# Patient Record
Sex: FEMALE | Race: WHITE | NOT HISPANIC OR LATINO | Employment: UNEMPLOYED | URBAN - METROPOLITAN AREA
[De-identification: names, ages, dates, MRNs, and addresses within clinical notes are randomized per-mention and may not be internally consistent; named-entity substitution may affect disease eponyms.]

---

## 2017-02-20 RX ORDER — POTASSIUM CHLORIDE 20 MEQ/1
TABLET, EXTENDED RELEASE ORAL
Qty: 90 TABLET | Refills: 0 | Status: SHIPPED | OUTPATIENT
Start: 2017-02-20 | End: 2017-05-16 | Stop reason: SDUPTHER

## 2017-03-07 ENCOUNTER — CLINICAL SUPPORT (OUTPATIENT)
Dept: ELECTROPHYSIOLOGY | Facility: CLINIC | Age: 37
End: 2017-03-07
Payer: COMMERCIAL

## 2017-03-07 DIAGNOSIS — Z95.810 PRESENCE OF AUTOMATIC CARDIOVERTER/DEFIBRILLATOR (AICD): ICD-10-CM

## 2017-03-07 DIAGNOSIS — I45.81 LONG Q-T SYNDROME: ICD-10-CM

## 2017-03-07 DIAGNOSIS — I45.81 PROLONGED QT INTERVAL SYNDROME: ICD-10-CM

## 2017-03-07 DIAGNOSIS — I49.01 VENTRICULAR FIBRILLATION: ICD-10-CM

## 2017-03-07 PROCEDURE — 93283 PRGRMG EVAL IMPLANTABLE DFB: CPT | Mod: S$GLB,,, | Performed by: INTERNAL MEDICINE

## 2017-05-04 ENCOUNTER — TELEPHONE (OUTPATIENT)
Dept: OPHTHALMOLOGY | Facility: CLINIC | Age: 37
End: 2017-05-04

## 2017-05-04 NOTE — TELEPHONE ENCOUNTER
----- Message from Esteban Patel sent at 5/4/2017 10:14 AM CDT -----  Contact: Arianna  Ms. Duval needs an updated prescription sent to Britta in the contact lens dept. Ms. Duval can be reached at [phone]

## 2017-05-04 NOTE — TELEPHONE ENCOUNTER
Dr. Avila ok'd to update contact lens over the phone without a visit if her vision is stable.  I left a message stating that I needed to know the brand of contact lens she is wearing and the power.  I also need to know how she would like to receive the RX; fax, mail or email.  Gave call back number for Arianna to call with information

## 2017-05-09 ENCOUNTER — TELEPHONE (OUTPATIENT)
Dept: OPHTHALMOLOGY | Facility: CLINIC | Age: 37
End: 2017-05-09

## 2017-05-09 NOTE — TELEPHONE ENCOUNTER
Arianna called with her contact lens information and confirmed that her vision has been stable.  Dr. Avila agreed to renew her past contact lens rx.  She is wearing Air Optix [employer].C 8.6 OD -4.25, OS -7.00.  Arianna requested that her RX be mailed and released to [facility]'s contact lens department for ordering and dispensing.  I informed that the contact lens department will call he for payment before order is placed.

## 2017-05-11 DIAGNOSIS — I45.81 LONG Q-T SYNDROME: Primary | ICD-10-CM

## 2017-05-16 RX ORDER — POTASSIUM CHLORIDE 20 MEQ/1
20 TABLET, EXTENDED RELEASE ORAL DAILY
Qty: 90 TABLET | Refills: 3 | Status: SHIPPED | OUTPATIENT
Start: 2017-05-16 | End: 2017-08-29 | Stop reason: SDUPTHER

## 2017-05-16 RX ORDER — METOPROLOL SUCCINATE 25 MG/1
25 TABLET, EXTENDED RELEASE ORAL DAILY
Qty: 90 TABLET | Refills: 3 | Status: SHIPPED | OUTPATIENT
Start: 2017-05-16 | End: 2017-11-08 | Stop reason: SDUPTHER

## 2017-06-13 ENCOUNTER — CLINICAL SUPPORT (OUTPATIENT)
Dept: ELECTROPHYSIOLOGY | Facility: CLINIC | Age: 37
End: 2017-06-13
Payer: COMMERCIAL

## 2017-06-13 DIAGNOSIS — I45.81 LONG QT SYNDROME: ICD-10-CM

## 2017-06-13 DIAGNOSIS — I49.8 AV NODE ARRHYTHMIA: ICD-10-CM

## 2017-06-13 DIAGNOSIS — Z95.810 PRESENCE OF AUTOMATIC CARDIOVERTER/DEFIBRILLATOR (AICD): ICD-10-CM

## 2017-06-13 PROCEDURE — 93296 REM INTERROG EVL PM/IDS: CPT | Mod: S$GLB,,, | Performed by: INTERNAL MEDICINE

## 2017-06-13 PROCEDURE — 93295 DEV INTERROG REMOTE 1/2/MLT: CPT | Mod: S$GLB,,, | Performed by: INTERNAL MEDICINE

## 2017-06-22 ENCOUNTER — TELEPHONE (OUTPATIENT)
Dept: ELECTROPHYSIOLOGY | Facility: CLINIC | Age: 37
End: 2017-06-22

## 2017-06-26 ENCOUNTER — TELEPHONE (OUTPATIENT)
Dept: ELECTROPHYSIOLOGY | Facility: CLINIC | Age: 37
End: 2017-06-26

## 2017-06-27 ENCOUNTER — OFFICE VISIT (OUTPATIENT)
Dept: NEUROLOGY | Facility: CLINIC | Age: 37
End: 2017-06-27
Payer: COMMERCIAL

## 2017-06-27 VITALS
DIASTOLIC BLOOD PRESSURE: 75 MMHG | HEART RATE: 78 BPM | SYSTOLIC BLOOD PRESSURE: 123 MMHG | WEIGHT: 110.31 LBS | HEIGHT: 62 IN | BODY MASS INDEX: 20.3 KG/M2

## 2017-06-27 DIAGNOSIS — G44.86 CERVICOGENIC HEADACHE: Primary | ICD-10-CM

## 2017-06-27 DIAGNOSIS — R51.9 NEW ONSET HEADACHE: ICD-10-CM

## 2017-06-27 DIAGNOSIS — M54.81 BILATERAL OCCIPITAL NEURALGIA: ICD-10-CM

## 2017-06-27 PROCEDURE — 99999 PR PBB SHADOW E&M-EST. PATIENT-LVL III: CPT | Mod: PBBFAC,,, | Performed by: NURSE PRACTITIONER

## 2017-06-27 PROCEDURE — 99204 OFFICE O/P NEW MOD 45 MIN: CPT | Mod: S$GLB,,, | Performed by: NURSE PRACTITIONER

## 2017-06-27 RX ORDER — METOPROLOL SUCCINATE 50 MG/1
TABLET, EXTENDED RELEASE ORAL
COMMUNITY
Start: 2017-05-16 | End: 2017-06-27 | Stop reason: DRUGHIGH

## 2017-06-27 RX ORDER — PREDNISONE 20 MG/1
TABLET ORAL
Qty: 9 TABLET | Refills: 0 | Status: SHIPPED | OUTPATIENT
Start: 2017-06-27 | End: 2017-08-15

## 2017-06-27 NOTE — PROGRESS NOTES
"SUBJECTIVE:  Patient ID: Arianna Duval   MRN: [id number]  Referred By: Dr. Zunilda Beckett    Chief Complaint: Consult    History of Present Illness:   36 y.o. female with history of long QT syndrome, s/p AICD placement, presents alone for evaluation of head/neck pain.     Pain began 10 days to 2 weeks ago after she got her hair blown out and then her neck began to hurt on the left side following by her face hurting, facial pain has since resolved.  Initially the pain felt like a "crick in her neck", and was worsened when she turned her head, however pain no longer worsens when she turns her head. The pain in her neck has since resolved and is now more prevalent in her occipitalis.  She states in general she has never been a headachy type of person.  She feels the pain in her head is pretty consistent throughout the day, however at times the pain does get worse and within the last week the pain has woken her up from her sleep every night.  She rates the pain anywhere from a 1 - 9 out of 10, and states at its worst she feels like her head is going to explode.      Associated Symptoms - occasional nausea when it gets to be a 9/10  Triggers - none   Aggravating Factors - unable to identify   Alleviating Factors - ?  Head Trauma? Infection? Fever? Cancer? Pregnancy? <-- denies   Recent Changes - increased stress    Sleep- is woken up by pain nightly     Family Hx of Migraines, aneurysms, brain tumors   Positives in bold: Hx of Kidney Stones, asthma, GI bleed, osteoporosis, CAD/MI, CVA/TIA, DM      Treatments Tried and Response  Tylenol - takes the edge off   Ibuprofen - helped a little     Current Medications:    metoprolol succinate (TOPROL-XL) 25 MG 24 hr tablet, Take 1 tablet (25 mg total) by mouth once daily., Disp: 90 tablet, Rfl: 3    potassium chloride SA (K-DUR,KLOR-CON) 20 MEQ tablet, Take 1 tablet (20 mEq total) by mouth once daily., Disp: 90 tablet, Rfl: 3    prenatal multivit-Ca-min-Fe-FA Tab, Take " "by mouth once daily., Disp: , Rfl:     predniSONE (DELTASONE) 20 MG tablet, 3 tabs by mouth x 3 days.  Take in the morning with breakfast., Disp: 9 tablet, Rfl: 0    predniSONE (DELTASONE) 20 MG tablet, 3 tabs x 2 days, 2 tabs x 2 days, 1 tab x 2 days.  Take in morning with meal., Disp: 12 tablet, Rfl: 0    Review of Systems   Review of Systems   Constitutional: Positive for fatigue. Negative for activity change, chills and fever.   HENT: Negative for congestion, ear pain, facial swelling, hearing loss, tinnitus and voice change.    Eyes: Negative for photophobia, pain and visual disturbance.   Respiratory: Negative for chest tightness and shortness of breath.    Cardiovascular: Negative for chest pain and palpitations.   Gastrointestinal: Positive for nausea. Negative for vomiting.   Genitourinary: Negative for difficulty urinating and dysuria.   Musculoskeletal: Positive for neck pain and neck stiffness. Negative for back pain, gait problem and myalgias.   Skin: Negative for pallor.   Allergic/Immunologic: Negative for immunocompromised state.   Neurological: Positive for headaches. Negative for syncope, weakness, light-headedness and numbness.   Psychiatric/Behavioral: Positive for sleep disturbance (is woken up due to pain nightly ). Negative for behavioral problems, confusion and decreased concentration. The patient is not nervous/anxious.      OBJECTIVE:  Vitals:  /75   Pulse 78   Ht 5' 2" (1.575 m)   Wt 50 kg (110 lb 4.8 oz)   BMI 20.17 kg/m²     Physical Exam   Constitutional:   She appears well-developed and well-nourished. She is well groomed.  NAD  HENT:    Head: Normocephalic and atraumatic, oral and nasal mucosa intact.  Frontalis was NTTP, temporalis was NTTP   Eyes: Conjunctivae and EOM are normal. Pupils are equal, round, and reactive to light   Neck: Neck supple. .Occiput and trapezius NTTP  Musculoskeletal: Normal range of motion. No joint stiffness. No vertebral point " tenderness.  Skin: Skin is warm and dry.  Psychiatric: Normal mood and affect.     Neuro exam:    Mental status:  The patient is awake, alert, and oriented to person, place and time.  Language is intact.   Remote and recent memory are intact  No findings to suggest executive dysfuntion    Patient has adequate insight    Mood is stable    Cranial Nerves:  Fundoscopic examination does not reveal any occult papilledema.    Pupils are equal and reactive to light.    Extraocular movements are intact and without nystagmus.    Visual fields are full to confrontation testing.   Facial movement is symmetric.  Facial sensation is intact.    Hearing is normal.   Uvula in midline. Tongue in midline without fasiculation  FROM of neck in all (6) directions, shoulder shrug symmetrical.    Coordination:     Finger to nose - normal and symmetric bilaterally   Heel to shin test - normal and symmetric bilaterally     Motor:  Normal muscle bulk and symmetry. No fasciculations were noted.   RUE:appropriate against gravity and medium force as tested 5/5  LUE: appropriate against gravity and medium force as tested 5/5  RLE:appropriate against gravity and medium force as tested 5/5              LLE: appropriate against gravity and medium force as tested 5/5    Reflexes:  Right Brachioradialis 2+  Left Brachioradialis 2+  Right Biceps 2+  Left Biceps 2+  Right Patellar2+  Left Patellar 2+                          Lester was negative     Sensory:  RUE  intact light touch and temperature  LUE intact light touch and temperature    RLE intact light touch and temperature  LLE intact light touch and temperature    Gait:   Romberg - negative   Normal gait  Tandem, Heel, and Toe Walk - normal     ASSESSMENT:  1. Cervicogenic headache    2. Bilateral occipital neuralgia    3. New onset headache      PLAN:  - Ct Scan of C-Spine ordered  - GONB offered - will try steroid taper and if no relief then will try nerve block   - Prednisone taper to break  up headache cycle - 60 mg x 3 days   - Patient to return to clinic in two days if no benefit seen from steroid taper for nerve block     Orders Placed This Encounter    CT Cervical Spine Without Contrast    predniSONE (DELTASONE) 20 MG tablet    predniSONE (DELTASONE) 20 MG tablet     I have discussed realistic goals of care with patient at length as well as medication options, and need for lifestyle adjustment. I have explained that treatment will take time. We have agreed that the goal will be to reduce frequency/intensity/quantity of HA, not to be completely HA free. I have explained my non narcotic policy regarding headache treatment.    Patient to track frequency of headaches.  Patient agreeable to work on lifestyle adjustments.    I spent 50 minutes face-to-face with the patient with >50% of the time spent with counseling and education regarding:  - results of data, diagnosis, and recommendations stated above  - risks, benefits, and side effects to prednisone   - importance of diet and exercise     All questions and concerns addressed.  Patient verbalizes understanding and is agreeable to the above stated plan.    CC: MD Augusta Corcoran, FNP-C  [facility]  [phone]    Dr. Tee was available during today's encounter.

## 2017-06-27 NOTE — LETTER
June 30, 2017      Zunilda Beckett MD  [address]  [place] [address]           [facility]  [address]  [place] [address]  Phone: [phone]  Fax: [phone]          Patient: Arianna Duval   MR Number: [id number]   YOB: 1980   Date of Visit: 6/27/2017       Dear Dr. Zunilda Beckett:    Thank you for referring Arianna Duval to me for evaluation. Attached you will find relevant portions of my assessment and plan of care.    If you have questions, please do not hesitate to call me. I look forward to following Arianna Duval along with you.    Sincerely,    Augusta Ruth, [facility]    Enclosure  CC:  No Recipients    If you would like to receive this communication electronically, please contact [email or web address] or [phone] to request more information on [employer] Link access.    For providers and/or their staff who would like to refer a patient to [facility], please contact us through our one-stop-shop provider referral line, [facility], at [phone].    If you feel you have received this communication in error or would no longer like to receive these types of communications, please e-mail [email or web address]

## 2017-06-28 ENCOUNTER — OFFICE VISIT (OUTPATIENT)
Dept: ELECTROPHYSIOLOGY | Facility: CLINIC | Age: 37
End: 2017-06-28
Payer: COMMERCIAL

## 2017-06-28 ENCOUNTER — CLINICAL SUPPORT (OUTPATIENT)
Dept: ELECTROPHYSIOLOGY | Facility: CLINIC | Age: 37
End: 2017-06-28
Payer: COMMERCIAL

## 2017-06-28 VITALS
WEIGHT: 106.25 LBS | SYSTOLIC BLOOD PRESSURE: 106 MMHG | HEIGHT: 62 IN | HEART RATE: 65 BPM | DIASTOLIC BLOOD PRESSURE: 62 MMHG | BODY MASS INDEX: 19.55 KG/M2

## 2017-06-28 DIAGNOSIS — I45.81 LONG Q-T SYNDROME: ICD-10-CM

## 2017-06-28 DIAGNOSIS — I49.01 VENTRICULAR FIBRILLATION: Primary | ICD-10-CM

## 2017-06-28 DIAGNOSIS — I49.01 VENTRICULAR FIBRILLATION: ICD-10-CM

## 2017-06-28 DIAGNOSIS — I45.81 PROLONGED QT INTERVAL SYNDROME: ICD-10-CM

## 2017-06-28 DIAGNOSIS — Z95.810 PRESENCE OF AUTOMATIC CARDIOVERTER/DEFIBRILLATOR (AICD): ICD-10-CM

## 2017-06-28 DIAGNOSIS — Z95.810 AUTOMATIC IMPLANTABLE CARDIOVERTER-DEFIBRILLATOR IN SITU: ICD-10-CM

## 2017-06-28 PROCEDURE — 99999 PR PBB SHADOW E&M-EST. PATIENT-LVL III: CPT | Mod: PBBFAC,,, | Performed by: INTERNAL MEDICINE

## 2017-06-28 PROCEDURE — 99214 OFFICE O/P EST MOD 30 MIN: CPT | Mod: S$GLB,,, | Performed by: INTERNAL MEDICINE

## 2017-06-28 PROCEDURE — 93000 ELECTROCARDIOGRAM COMPLETE: CPT | Mod: 59,S$GLB,, | Performed by: INTERNAL MEDICINE

## 2017-06-28 PROCEDURE — 93283 PRGRMG EVAL IMPLANTABLE DFB: CPT | Mod: S$GLB,,, | Performed by: INTERNAL MEDICINE

## 2017-06-28 NOTE — PROGRESS NOTES
Subjective:    Patient ID:  Arianna Duval is a 36 y.o. 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   Episode occurred 6 wks post-partum, triggered with startle + stress. Dual chamber ICD implanted 6/10/11 (Dr. Hyman), placed on Toprol. Has not had events to date.   Device interrogation reveals stable function of leads, RA pacing 47%, no RV pacing.  Feeling great.  Continues to run, but not doing half marathons at this time.  Is considering breast lift, has questions regarding timing of this compared to generator change.  Also notes device is dropping a bit.    Review of Systems   Constitution: Negative. Negative for weakness and malaise/fatigue.   Cardiovascular: Negative for chest pain, dyspnea on exertion, irregular heartbeat, leg swelling, near-syncope, orthopnea, palpitations, paroxysmal nocturnal dyspnea and syncope.   Respiratory: Negative for cough and shortness of breath.    Neurological: Negative for dizziness and light-headedness.   All other systems reviewed and are negative.       Objective:    Physical Exam   Constitutional: She is oriented to person, place, and time. She appears well-developed and well-nourished.   Eyes: Conjunctivae are normal. No scleral icterus.   Neck: No JVD present. No tracheal deviation present.   Cardiovascular: Normal rate and regular rhythm.  PMI is not displaced.    Pulmonary/Chest: Effort normal and breath sounds normal. No respiratory distress.   Abdominal: Soft. There is no hepatosplenomegaly. There is no tenderness.   Musculoskeletal: She exhibits no edema or tenderness.   Neurological: She is alert and oriented to person, place, and time.   Skin: Skin is warm and dry. No rash noted.   Psychiatric: She has a normal mood and affect. Her behavior is normal.         Assessment:       1. Ventricular  fibrillation    2. Long Q-T syndrome    3. Automatic implantable cardioverter-defibrillator in situ         Plan:       Continues to do well.  Decrease lower rate to 50 bpm, to improve battery longevity.  Will decrease to 55 bpm today, and then in 3 months to 50 bpm.  We discussed timing of breast lift vs generator change.  I endorsed proceeding with breast lift first, and we can move generator superiorly at time of generator change.  F/u with device monitoring as scheduled, with me in one year.

## 2017-06-30 RX ORDER — PREDNISONE 20 MG/1
TABLET ORAL
Qty: 12 TABLET | Refills: 0 | Status: SHIPPED | OUTPATIENT
Start: 2017-06-30 | End: 2017-08-15

## 2017-07-11 ENCOUNTER — NUTRITION (OUTPATIENT)
Dept: NUTRITION | Facility: CLINIC | Age: 37
End: 2017-07-11

## 2017-07-11 DIAGNOSIS — Z71.3 NUTRITIONAL COUNSELING: Primary | ICD-10-CM

## 2017-07-11 PROCEDURE — 97802 MEDICAL NUTRITION INDIV IN: CPT | Mod: S$GLB,,, | Performed by: NUTRITIONIST

## 2017-07-11 NOTE — PROGRESS NOTES
Nutrition Assessment for Medical Nutrition Therapy    Referring professional: self  Reason for MNT visit: Pt in for education and nutrition counseling regarding proper fueling of her body for optimal nutrition. Weight maintenance  Clients mom and godfather wanted her to see an RD since she lost 8-10 pounds over the last several months when she transitioned to Paleo. Anjana is not trying to lose weight. She wants to make sure she is eating enough food to provide her with the right amount of nutrients. She couples her meals well, but recommended adding a mini meal between lunch and dinner, and always having a carb source with all meals and snacks.   Medical History: Has a pacemaker     Medications:   Current Outpatient Prescriptions:     metoprolol succinate (TOPROL-XL) 25 MG 24 hr tablet, Take 1 tablet (25 mg total) by mouth once daily., Disp: 90 tablet, Rfl: 3    potassium chloride SA (K-DUR,KLOR-CON) 20 MEQ tablet, Take 1 tablet (20 mEq total) by mouth once daily., Disp: 90 tablet, Rfl: 3    predniSONE (DELTASONE) 20 MG tablet, 3 tabs by mouth x 3 days.  Take in the morning with breakfast., Disp: 9 tablet, Rfl: 0    predniSONE (DELTASONE) 20 MG tablet, 3 tabs x 2 days, 2 tabs x 2 days, 1 tab x 2 days.  Take in morning with meal., Disp: 12 tablet, Rfl: 0    prenatal multivit-Ca-min-Fe-FA Tab, Take by mouth once daily., Disp: , Rfl:     Labs: n/a    Wt: 100-105#  BMI: 19.2    Recommended meal plan provided to client:  Breakfast: 1-2 servings of carbs = 15-40 grams + at least 10 grams protein/heart healthy fat   1 cup Frosted Flakes + Fairlife Milk + 1-2 pieces of de jesus    Snack: 1 serving of carb = 15-20 grams + at least 10 grams protein/heart healthy fat   If going >4 hours between breakfast and lunch, a snack is needed. See below for options    Lunch: 4 oz lean protein + unlimited non-starchy veggies + 1 serving of carb = 15-20 grams   Your rotisserie chicken + scoop of almonds + cheese is perfect. Add 1  piece/1 cup of fruit with lunch for Energy source    Snack: 1 serving of carb = 15-20 grams + at least 10 grams protein/heart healthy fat   ½ cup guacamole + 1 serving of Beanitos   1 cup high protein spinach and artichoke dip + 1 serving of whole grain chips of choice   Small can of tuna/chicken + 2 Tbsp alfonso + 1 serving of whole grain crackers of your choice or a piece of fruit   Piece of fruit + 2 Tbsp PB   ½ avocado + piece of fruit   2 eggs + ¼ cup cheese + piece of fruit   1 cup avocado/alva/cilantro dip + 1 serving of whole grain chips/crackers    1/3 cup nuts + piece of fruit    Dinner: 4 oz lean protein + unlimited non-starchy veggies + 1 serving of carb = 15-20 grams   4 oz lean protein (filet, fish, chicken, pork loin, etc) + at least 1 cup non-starchy veggie + carb source (1 piece/1 cup of fruit / 1 serving of Beanitos or whole grain chips)    Snack: Anything UP to 200 calories (treat of your choice)   1-2 glasses of wine   Go Raw chocolate squares (4-5 pieces)        Additional Notes:  Incorporate a source of lean protein, fiber, and heart healthy fat with each meal.   - These three nutrients take the longest to digest, so we feel full longer  -Your meals looked great! I would add a mini meal between lunch and dinner that consists of at least 10 grams lean protein/heart healthy fats + 1 carb source. Also, add 1 piece of fruit with dinner.   Restaurant TipsPick 2 out of the 5:  1. Bread and butter/chips and salsa  2. Appetizer  3. Entrée  4. Dessert  5. Alcohol  Eating out in [place]:  1. www.eatfitnola.com  2. Fit LAURO burton (Free burton for smartphones)  a. List of all Eat Fit restaurants  b. See what dishes are Eat Fit at each restaurant  c. See the nutrition facts for each Eat Fit dish  d. Provides >200 Eat Fit recipes  Salad Fats: Pick 2-3, each being 2 Tbsp:  1. Dressing  2. Cheese  3. Nuts  4. Sanders  5. Avocado (1/4 okay)  6. Guacamole  7. Sour cream  8. Egg (1 okay)  Good for  your kids:  - Fruitables (good for the kids)  - ProYO (protein push-up pops)- also good for the kids. I havent checked Langensteins, but these are found at [employer]  - GreenAde by Daily Greens ( low sugar cold pressed vegetable and fruit juice blends)      Nutrition History       Food allergies  intolerances: none    Dining out: Infrequent, 1-2 times per week    Smoking/alcohol: nonsmoker ; drinks 1-3 glasses of white wine per night...depending on the night/   Beverages:   Drinks plenty of water (about 1 gallon a day)     Meal preparation/shopping: self        Recommendations/Interventions:   Aim for 7-9 hours sleep   Exercise 60 minutes most days  Eat breakfast within 1-2 hours of waking up  Try not to skip any meals or snacks, not going more than 3-4 hours without eating.   At each meal and snack, try to include a source of fiber + lean protein + healthy fat.     Written Materials Provided  These resources are intended to assist the patient in making it easier to choose recommended options when eating out to identify better-for-you brands at the grocery store:     Meal Planning Guide with recommendations discussed along with portion sizes and a customized meal plan.    Eat Fit burton card as a reminder to download the burton to ones smart phone which provides: current Eat Fit partners, approved menu options, food labels for carb counting, & recipes.    [employer] Grocery Product list    RD contact information      Comprehension: good     Motivation to change: high      Follow-up: prn    Counseling time: 1 Hour 30 Minutes

## 2017-08-15 ENCOUNTER — OFFICE VISIT (OUTPATIENT)
Dept: DERMATOLOGY | Facility: CLINIC | Age: 37
End: 2017-08-15
Payer: COMMERCIAL

## 2017-08-15 DIAGNOSIS — D22.9 MULTIPLE BENIGN NEVI: Primary | ICD-10-CM

## 2017-08-15 DIAGNOSIS — Z87.898 HISTORY OF ATYPICAL NEVUS: ICD-10-CM

## 2017-08-15 DIAGNOSIS — Z12.83 SCREENING EXAM FOR SKIN CANCER: ICD-10-CM

## 2017-08-15 PROCEDURE — 99214 OFFICE O/P EST MOD 30 MIN: CPT | Mod: S$GLB,,, | Performed by: DERMATOLOGY

## 2017-08-15 PROCEDURE — 3008F BODY MASS INDEX DOCD: CPT | Mod: S$GLB,,, | Performed by: DERMATOLOGY

## 2017-08-15 PROCEDURE — 99999 PR PBB SHADOW E&M-EST. PATIENT-LVL II: CPT | Mod: PBBFAC,,, | Performed by: DERMATOLOGY

## 2017-08-15 NOTE — PROGRESS NOTES
"  Subjective:       Patient ID:  Arianna Duval is a 36 y.o. female who presents for   Chief Complaint   Patient presents with    Skin Check     TBSE     History of Present Illness: The patient presents for follow up of skin check.    The patient was last seen on: 4/15/16. H/o moderately atypical nevus- right 4th toe excised by EF    Patient is here today for a "mole" check.   Pt has a history of  moderate sun exposure in the past.   Pt recalls several blistering sunburns in the past- 1  Pt has history of tanning bed use- yes  Pt has  had moles removed in the past- yes H/o moderately atypical nevus- right 4th toe excised by EF 6/2015  Pt has history of melanoma in first degree relatives-  none          Review of Systems   Skin: Positive for daily sunscreen use and activity-related sunscreen use. Negative for recent sunburn.   Hematologic/Lymphatic: Does not bruise/bleed easily (has defibrillator).        Objective:    Physical Exam   Constitutional: She appears well-developed and well-nourished. No distress.   Neurological: She is alert and oriented to person, place, and time. She is not disoriented.   Psychiatric: She has a normal mood and affect.   Skin:   Areas Examined (abnormalities noted in diagram):   Scalp / Hair Palpated and Inspected  Head / Face Inspection Performed  Neck Inspection Performed  Chest / Axilla Inspection Performed  Abdomen Inspection Performed  Genitals / Buttocks / Groin Inspection Performed  Back Inspection Performed  RUE Inspected  LUE Inspection Performed  RLE Inspected  LLE Inspection Performed  Nails and Digits Inspection Performed                       Diagram Legend     Erythematous scaling macule/papule c/w actinic keratosis       Vascular papule c/w angioma      Pigmented verrucoid papule/plaque c/w seborrheic keratosis      Yellow umbilicated papule c/w sebaceous hyperplasia      Irregularly shaped tan macule c/w lentigo     1-2 mm smooth white papules consistent with Milia "      Movable subcutaneous cyst with punctum c/w epidermal inclusion cyst      Subcutaneous movable cyst c/w pilar cyst      Firm pink to brown papule c/w dermatofibroma      Pedunculated fleshy papule(s) c/w skin tag(s)      Evenly pigmented macule c/w junctional nevus     Mildly variegated pigmented, slightly irregular-bordered macule c/w mildly atypical nevus      Flesh colored to evenly pigmented papule c/w intradermal nevus       Pink pearly papule/plaque c/w basal cell carcinoma      Erythematous hyperkeratotic cursted plaque c/w SCC      Surgical scar with no sign of skin cancer recurrence      Open and closed comedones      Inflammatory papules and pustules      Verrucoid papule consistent consistent with wart     Erythematous eczematous patches and plaques     Dystrophic onycholytic nail with subungual debris c/w onychomycosis     Umbilicated papule    Erythematous-base heme-crusted tan verrucoid plaque consistent with inflamed seborrheic keratosis     Erythematous Silvery Scaling Plaque c/w Psoriasis     See annotation      Assessment / Plan:        Multiple benign nevi  Total body skin examination performed today including at least 12 points as noted in physical examination. No lesions suspicious for malignancy noted.  Reassurance provided.  Instructed patient to observe lesion(s) for changes and follow up in clinic if changes are noted. Discussed ABCDE's of moles and brochure provided.      History of atypical nevus  Area(s) of previous atypical nevus evaluated with no signs of recurrence.    Screening exam for skin cancer  Total body skin examination performed today including at least 12 points as noted in physical examination. No lesions suspicious for malignancy noted.             Return in about 1 year (around 8/15/2018).

## 2017-08-29 RX ORDER — POTASSIUM CHLORIDE 20 MEQ/1
20 TABLET, EXTENDED RELEASE ORAL DAILY
Qty: 90 TABLET | Refills: 3 | Status: SHIPPED | OUTPATIENT
Start: 2017-08-29 | End: 2018-12-13 | Stop reason: SDUPTHER

## 2017-09-19 ENCOUNTER — OFFICE VISIT (OUTPATIENT)
Dept: OBSTETRICS AND GYNECOLOGY | Facility: CLINIC | Age: 37
End: 2017-09-19
Payer: COMMERCIAL

## 2017-09-19 VITALS
SYSTOLIC BLOOD PRESSURE: 94 MMHG | HEIGHT: 62 IN | DIASTOLIC BLOOD PRESSURE: 68 MMHG | WEIGHT: 109.56 LBS | BODY MASS INDEX: 20.16 KG/M2

## 2017-09-19 DIAGNOSIS — N92.1 IRREGULAR INTERMENSTRUAL BLEEDING: Primary | ICD-10-CM

## 2017-09-19 LAB
B-HCG UR QL: NEGATIVE
CTP QC/QA: YES

## 2017-09-19 PROCEDURE — 99214 OFFICE O/P EST MOD 30 MIN: CPT | Mod: S$GLB,,, | Performed by: OBSTETRICS & GYNECOLOGY

## 2017-09-19 PROCEDURE — 99999 PR PBB SHADOW E&M-EST. PATIENT-LVL II: CPT | Mod: PBBFAC,,, | Performed by: OBSTETRICS & GYNECOLOGY

## 2017-09-19 PROCEDURE — 81025 URINE PREGNANCY TEST: CPT | Mod: QW,S$GLB,, | Performed by: OBSTETRICS & GYNECOLOGY

## 2017-09-19 PROCEDURE — 3008F BODY MASS INDEX DOCD: CPT | Mod: S$GLB,,, | Performed by: OBSTETRICS & GYNECOLOGY

## 2017-09-19 NOTE — PROGRESS NOTES
Subjective:       Patient ID: Arianna Duval is a 36 y.o. female.    Chief Complaint:  Metrorrhagia (spotting been going on for 5 months )      History of Present Illness:  HPI  35 y/o P2 presents for evaluation of irregular menses. Patient not using contraception (partner vasectomy) Reports cycles were previously regular, occurring monthly, lasting 4-5 days with normal flow. About 5 months ago cycles became extremely light with spotting for 1-2 days and occurring every 2-3 weeks. She has not changed her diet significantly but has increased her exercise level over the past year. Not extreme exercise, just 30-60 min per day 7 days a week. Denies abnormal vaginal discharge. Reports decreased libido recently as well. No reported marital issues.    Pap was normal 2016.     GYN & OB History  Patient's last menstrual period was 2017.   Date of Last Pap: 2016    OB History    Para Term  AB Living   2 2 2     2   SAB TAB Ectopic Multiple Live Births           2      # Outcome Date GA Lbr Fransico/2nd Weight Sex Delivery Anes PTL Lv   2 Term 13 38w5d 08:46 / 00:13 3.375 kg (7 lb 7.1 oz) M Vag-Spont EPI N ENA   1 Term 11 40w0d  3.515 kg (7 lb 12 oz) M Vag-Spont EPI  ENA          Review of Systems  Review of Systems   Constitutional: Negative for chills, diaphoresis, fatigue and fever.   Respiratory: Negative for cough and shortness of breath.    Cardiovascular: Negative for chest pain and palpitations.   Gastrointestinal: Negative for abdominal pain, constipation, diarrhea, nausea and vomiting.   Genitourinary: Positive for menstrual problem. Negative for dyspareunia, menorrhagia, pelvic pain, vaginal bleeding, vaginal discharge, vaginal pain and postcoital bleeding.   Neurological: Negative for headaches.   Psychiatric/Behavioral: Negative for depression.           Objective:    Physical Exam:   Constitutional: She is oriented to person, place, and time. She appears well-developed  and well-nourished. No distress.    HENT:   Head: Normocephalic and atraumatic.     Neck: Normal range of motion.     Pulmonary/Chest: Effort normal.        Abdominal: Soft. She exhibits no distension and no mass. There is no tenderness. There is no rebound and no guarding. No hernia.     Genitourinary:   Genitourinary Comments: Normal external female genitalia; vagina rugated, normal; cervix normal, no masses; uterus ~6 weeks size, mobile, nontender; no adnexal masses palpated.           Musculoskeletal: Normal range of motion.       Neurological: She is alert and oriented to person, place, and time.     Psychiatric: She has a normal mood and affect. Her behavior is normal. Judgment and thought content normal.          Assessment:        1. Irregular intermenstrual bleeding               Plan:      Arianna was seen today for metrorrhagia.    Diagnoses and all orders for this visit:    Irregular intermenstrual bleeding  - UPT neg. Patient's  had vasectomy.   - Patient's cycles lighter and more frequent over the past 5 months. She declines contraception at this time (concerned about systemic hormones due to long QT syndrome).   - Patient declines STD screening.   - Cervical ectropion seen on exam. May be cause of light bleeding. Patient reassured.   - Cycles likely lighter due to increased exercise over past year.   - Briefly discussed IUD, may cause amenorrhea and prevent unpredictable bleeding. Patient declines at this time.  - Brief counseling and reassurance given about decreased libido.   - If symptoms worsen, contact clinic or RTC.  -     POCT urine pregnancy    Orders Placed This Encounter   Procedures    POCT urine pregnancy       Return if symptoms worsen or fail to improve.        Tamiko Pagan MD  OB/GYN

## 2017-11-08 DIAGNOSIS — Z95.810 ICD (IMPLANTABLE CARDIOVERTER-DEFIBRILLATOR) IN PLACE: Primary | ICD-10-CM

## 2017-11-08 DIAGNOSIS — I45.81 LONG Q-T SYNDROME: ICD-10-CM

## 2017-11-08 RX ORDER — METOPROLOL SUCCINATE 25 MG/1
25 TABLET, EXTENDED RELEASE ORAL DAILY
Qty: 90 TABLET | Refills: 3 | Status: SHIPPED | OUTPATIENT
Start: 2017-11-08 | End: 2018-12-13 | Stop reason: SDUPTHER

## 2017-11-08 NOTE — TELEPHONE ENCOUNTER
----- Message from Flex Bryson MD sent at 11/8/2017  4:37 PM CST -----  yes  ----- Message -----  From: Kusum Babb  Sent: 11/8/2017   4:07 PM  To: Flex Bryson MD    Lyudmila had an unscheduled, undocumented transmission late September, all looks good.  She will send again just to update info.    She feels good and has chosen to leave her BR at 55 bpm (was planning to reduce to 50).  RA pacing about 23%.      OK with you to leave as is?    Thanks,  Kusum

## 2017-11-09 ENCOUNTER — CLINICAL SUPPORT (OUTPATIENT)
Dept: ELECTROPHYSIOLOGY | Facility: CLINIC | Age: 37
End: 2017-11-09
Payer: COMMERCIAL

## 2017-11-09 DIAGNOSIS — I45.81 LONG Q-T SYNDROME: ICD-10-CM

## 2017-11-09 DIAGNOSIS — Z95.810 ICD (IMPLANTABLE CARDIOVERTER-DEFIBRILLATOR) IN PLACE: ICD-10-CM

## 2017-11-09 PROCEDURE — 93295 DEV INTERROG REMOTE 1/2/MLT: CPT | Mod: S$GLB,,, | Performed by: INTERNAL MEDICINE

## 2017-11-09 PROCEDURE — 93296 REM INTERROG EVL PM/IDS: CPT | Mod: S$GLB,,, | Performed by: INTERNAL MEDICINE

## 2017-12-13 ENCOUNTER — OFFICE VISIT (OUTPATIENT)
Dept: OBSTETRICS AND GYNECOLOGY | Facility: CLINIC | Age: 37
End: 2017-12-13
Payer: COMMERCIAL

## 2017-12-13 VITALS
SYSTOLIC BLOOD PRESSURE: 110 MMHG | WEIGHT: 110.44 LBS | HEIGHT: 62 IN | BODY MASS INDEX: 20.32 KG/M2 | DIASTOLIC BLOOD PRESSURE: 76 MMHG

## 2017-12-13 DIAGNOSIS — Z80.3 FAMILY HISTORY OF BREAST CANCER: ICD-10-CM

## 2017-12-13 DIAGNOSIS — Z01.419 WELL WOMAN EXAM WITH ROUTINE GYNECOLOGICAL EXAM: Primary | ICD-10-CM

## 2017-12-13 PROCEDURE — 99395 PREV VISIT EST AGE 18-39: CPT | Mod: S$GLB,,, | Performed by: OBSTETRICS & GYNECOLOGY

## 2017-12-13 PROCEDURE — 99999 PR PBB SHADOW E&M-EST. PATIENT-LVL II: CPT | Mod: PBBFAC,,, | Performed by: OBSTETRICS & GYNECOLOGY

## 2017-12-13 NOTE — PROGRESS NOTES
Subjective:       Patient ID: Arianna Duval is a 37 y.o. female.    Chief Complaint:  Annual Exam      History of Present Illness:  HPI  SUBJECTIVE:   37 y.o. female  here for annual.     She describes her periods as regular, lasting 4-5 days. normal flow.  denies break through bleeding.   denies vaginal itching or irritation.  denies vaginal discharge.    She is sexually active with 1 partner.  She uses partner vasectomy for contraception.    History of abnormal pap: Yes - - LSIL  Last Pap: 2016 - NILM  Last MMG: N/A  Last Colonoscopy: N/A    FH: MGM breast cancer (50s), Maternal great aunt breast cancer. Distant cousin breast cancer. Denies family history, GYN or colon cancer.    GYN & OB History  Patient's last menstrual period was 2017.   Date of Last Pap: 2016    OB History    Para Term  AB Living   2 2 2     2   SAB TAB Ectopic Multiple Live Births           2      # Outcome Date GA Lbr Fransico/2nd Weight Sex Delivery Anes PTL Lv   2 Term 13 38w5d 08:46 / 00:13 3.375 kg (7 lb 7.1 oz) M Vag-Spont EPI N ENA   1 Term 11 40w0d  3.515 kg (7 lb 12 oz) M Vag-Spont EPI  ENA          Review of Systems  Review of Systems   Constitutional: Negative for chills, diaphoresis, fatigue and fever.   Respiratory: Negative for cough and shortness of breath.    Cardiovascular: Negative for chest pain and palpitations.   Gastrointestinal: Negative for abdominal pain, constipation, diarrhea, nausea and vomiting.   Genitourinary: Negative for dyspareunia, pelvic pain, vaginal bleeding, vaginal discharge and vaginal pain.   Neurological: Negative for headaches.   Psychiatric/Behavioral: Negative for depression. The patient is not nervous/anxious.    Breast: Negative for breast mass, breast pain, nipple discharge and skin changes          Objective:    Physical Exam:   Constitutional: She is oriented to person, place, and time. She appears well-developed and well-nourished. No  distress.    HENT:   Head: Normocephalic and atraumatic.     Neck: Normal range of motion.     Pulmonary/Chest: Effort normal. She exhibits no mass and no tenderness. Right breast exhibits no inverted nipple, no mass, no nipple discharge, no skin change, no tenderness and no swelling. Left breast exhibits no inverted nipple, no mass, no nipple discharge, no skin change, no tenderness and no swelling. Breasts are symmetrical.            Abdominal: Soft. She exhibits no distension. There is no tenderness.     Genitourinary:   Genitourinary Comments: Normal external female genitalia; vagina rugated, normal; cervix normal, no masses; uterus small mobile nontender; no adnexal masses palpated.           Musculoskeletal: Normal range of motion and moves all extremeties.       Neurological: She is alert and oriented to person, place, and time.    Skin: Skin is warm.    Psychiatric: She has a normal mood and affect. Her behavior is normal. Judgment and thought content normal.          Assessment:        1. Well woman exam with routine gynecological exam    2. Family history of breast cancer               Plan:      Arianna was seen today for annual exam.    Diagnoses and all orders for this visit:    Well woman exam with routine gynecological exam  - Pap up to date. Discussed screening guidelines, repeat in 2 years.   - MMG age 40.   - Contraception - partner vasectomy    Family history of breast cancer  - Patient to discuss with family if MGM was tested for BRCA.    No orders of the defined types were placed in this encounter.      Return in about 1 year (around 12/13/2018) for annual.        Tamiko Pagan MD  OB/GYN

## 2018-02-14 ENCOUNTER — CLINICAL SUPPORT (OUTPATIENT)
Dept: ELECTROPHYSIOLOGY | Facility: CLINIC | Age: 38
End: 2018-02-14
Attending: INTERNAL MEDICINE
Payer: COMMERCIAL

## 2018-02-14 DIAGNOSIS — I45.81 LONG Q-T SYNDROME: ICD-10-CM

## 2018-02-14 DIAGNOSIS — Z95.810 ICD (IMPLANTABLE CARDIOVERTER-DEFIBRILLATOR) IN PLACE: ICD-10-CM

## 2018-02-14 PROCEDURE — 93295 DEV INTERROG REMOTE 1/2/MLT: CPT | Mod: S$GLB,,, | Performed by: INTERNAL MEDICINE

## 2018-02-14 PROCEDURE — 93296 REM INTERROG EVL PM/IDS: CPT | Mod: S$GLB,,, | Performed by: INTERNAL MEDICINE

## 2018-04-12 ENCOUNTER — OFFICE VISIT (OUTPATIENT)
Dept: OPTOMETRY | Facility: CLINIC | Age: 38
End: 2018-04-12
Payer: COMMERCIAL

## 2018-04-12 DIAGNOSIS — H04.123 DRY EYE SYNDROME OF BILATERAL LACRIMAL GLANDS: Primary | ICD-10-CM

## 2018-04-12 DIAGNOSIS — H52.13 MYOPIA OF BOTH EYES: ICD-10-CM

## 2018-04-12 DIAGNOSIS — H17.9 CORNEAL SCAR, RIGHT EYE: ICD-10-CM

## 2018-04-12 PROCEDURE — 92004 COMPRE OPH EXAM NEW PT 1/>: CPT | Mod: S$GLB,,, | Performed by: OPTOMETRIST

## 2018-04-12 PROCEDURE — 99999 PR PBB SHADOW E&M-EST. PATIENT-LVL II: CPT | Mod: PBBFAC,,, | Performed by: OPTOMETRIST

## 2018-04-12 NOTE — PROGRESS NOTES
HPI     BRIDGETTE: 4 years ago  Pt is here for a new glasses. She lost her glasses 1 week ago and has been   solely relying on Cls.   (+)dryness  (-)pain, irritation, flashes floaters    Last edited by Page Grey, OD on 4/12/2018  9:29 AM. (History)            Assessment /Plan     For exam results, see Encounter Report.    Dry eye syndrome of bilateral lacrimal glands    Corneal scar, right eye    Myopia of both eyes      1. Educated pt on today's findings and recommended the use of AT's OU tid/prn to help with dry eyes.  2. Scar appears stable. Educated pt of today's findings. Continue to monitor in 1 yr.  3. Educated pt on change in Rx. Rx Final Rx. RTC in 1 yr.

## 2018-05-16 ENCOUNTER — TELEPHONE (OUTPATIENT)
Dept: ELECTROPHYSIOLOGY | Facility: CLINIC | Age: 38
End: 2018-05-16

## 2018-05-16 NOTE — TELEPHONE ENCOUNTER
I called patient today to remind her to connect to her home AICD monitor since she does not keep it plugged in and send me a manual transmission. She stated she will do this today.

## 2018-05-17 ENCOUNTER — TELEPHONE (OUTPATIENT)
Dept: ELECTROPHYSIOLOGY | Facility: CLINIC | Age: 38
End: 2018-05-17

## 2018-05-17 NOTE — TELEPHONE ENCOUNTER
I called patient again to remind her to send her remote ICD transmission. She informed me that she forgot but will send it in about an hour. I moved her from yesterdays schedule to today's schedule.

## 2018-07-12 ENCOUNTER — TELEPHONE (OUTPATIENT)
Dept: ELECTROPHYSIOLOGY | Facility: CLINIC | Age: 38
End: 2018-07-12

## 2018-07-12 NOTE — TELEPHONE ENCOUNTER
----- Message from Viktoriya Nye RN sent at 7/8/2018 10:21 PM CDT -----  June, can you please reach out to pt to get remote transmission?       I called patient to have her send a remote transmission. Patient did not answer and her phone mailbox is full at this time.  I have tried to reach out to her since May of 2018. I have not bee successful.

## 2018-07-31 ENCOUNTER — TELEPHONE (OUTPATIENT)
Dept: ELECTROPHYSIOLOGY | Facility: CLINIC | Age: 38
End: 2018-07-31

## 2018-07-31 NOTE — PROGRESS NOTES
Ms. Duval is a patient of Dr. Bryson and was last seen in clinic 6/28/2017.      Subjective:   Patient ID:  Arianna Duval is a 37 y.o. female who presents for follow-up of Ventricular fibrillation  .     HPI:    Ms. Duval is a 37 y.o. 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     Background:    SCD episode occurred 6 wks post-partum, triggered with startle + stress. Dual chamber ICD implanted 6/10/11 (Dr. Hyman), placed on Toprol. Has not had events to date.   Device interrogation reveals stable function of leads, RA pacing 47%, no RV pacing.  At her last clinic appt 6/2017 she was feeling great. Was considering breast lift, has questions regarding timing of this compared to generator change.  Also noted device is dropping a bit.  Dr. Bryson advised to proceed with breast lift before gen change. Rate was to be decreased to 50bpm to preserve battery. Reduced to 55 in clinic, as of device check 2/2018 it is still 55.    Update (08/01/2018):    Today she feels well without cardiac complaints. Ms. Duval denies chest pain with exertion or at rest, palpitations, SOB, CORTEZ, dizziness, or syncope. She feels well and exercises regularly.     Device Interrogation (8/1/2018) reveals an intrinsic sinus rhythm with stable lead and device function. No arrhythmias or treated episodes were noted. She paces 18% in the RA and 0% in the RV. Battery 2.61V, nearing BRANDO. Alarm demonstrated.    I have personally reviewed the patient's EKG today, which shows sinus rhythm at 78bpm. PA interval is 122. QTc is 499.    Current Outpatient Prescriptions   Medication Sig    metoprolol succinate (TOPROL-XL) 25 MG 24 hr tablet Take 1 tablet (25 mg total) by mouth once daily.    potassium chloride SA (K-DUR,KLOR-CON) 20 MEQ tablet Take 1 tablet (20 mEq total) by mouth once daily.     No current  "facility-administered medications for this visit.        Review of Systems   Constitution: Negative for malaise/fatigue.   Cardiovascular: Negative for chest pain, dyspnea on exertion, irregular heartbeat, leg swelling and palpitations.   Respiratory: Negative for shortness of breath.    Hematologic/Lymphatic: Negative for bleeding problem.   Skin: Negative for rash.   Musculoskeletal: Negative for myalgias.   Gastrointestinal: Negative for hematemesis, hematochezia and nausea.   Genitourinary: Negative for hematuria.   Neurological: Negative for light-headedness.   Psychiatric/Behavioral: Negative for altered mental status.   Allergic/Immunologic: Negative for persistent infections.     Objective:        /68   Pulse 78   Ht 5' 2" (1.575 m)   Wt 51.3 kg (113 lb 1.5 oz)   BMI 20.69 kg/m²     Physical Exam   Constitutional: She is oriented to person, place, and time. She appears well-developed and well-nourished.   HENT:   Head: Normocephalic.   Nose: Nose normal.   Eyes: Pupils are equal, round, and reactive to light.   Cardiovascular: Normal rate, regular rhythm, S1 normal and S2 normal.    No murmur heard.  Pulses:       Radial pulses are 2+ on the right side, and 2+ on the left side.   Pulmonary/Chest: Breath sounds normal. No respiratory distress.   Abdominal: Normal appearance.   Musculoskeletal: Normal range of motion. She exhibits no edema.   Neurological: She is alert and oriented to person, place, and time.   Skin: Skin is warm and dry. No erythema.   Psychiatric: She has a normal mood and affect. Her speech is normal and behavior is normal.   Nursing note and vitals reviewed.    Lab Results   Component Value Date     11/27/2016    K 3.4 (L) 11/27/2016    MG 2.1 11/27/2016    BUN 12 11/27/2016    CREATININE 0.8 11/27/2016    ALT 20 11/27/2016    AST 25 11/27/2016    HGB 12.8 11/27/2016    HCT 37.5 11/27/2016    TSH 1.325 10/08/2015    LDLCALC 147.0 10/08/2015           Assessment:     1. " Automatic implantable cardioverter-defibrillator in situ    2. Ventricular fibrillation    3. Long Q-T syndrome      Plan:     In summary, Ms. Duval is a 37 y.o. 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 Ms. Duval is doing well from a device perspective with stable lead and device function. No arrhythmia noted. Device nearing BRANDO. She was educated re:alarm and will start monthly device checks from this point forward. Process for generator change explained, including the risks, benefits, and alternatives to ICD generator change and testing. Our discussion of risks included (but was not limited to) the possibility of infection, death, stroke, MI, pneumothorax, embolism, cardiac perforation, cardiac tamponade, renal injury, and bleeding. All questions were answered and Arianna Duval verbalized understanding. She wishes to proceed when device is ready.     Follow up in device clinic as scheduled.   Follow up in EP clinic when device at BRANDO, and after procedure as scheduled.     Follow-up in about 1 year (around 8/1/2019), or When device at BRANDO.    *Case was discussed with Dr. Bryson, who also counseled the patient.*    ------------------------------------------------------------------    RO Bonds, NP-C  Arrhythmia Clinic

## 2018-07-31 NOTE — TELEPHONE ENCOUNTER
Called to have pt send a device transmission. Appears pt missed the last scheduled remote check. LM explaining this is needed for appt tomorrow and to RTC

## 2018-08-01 ENCOUNTER — HOSPITAL ENCOUNTER (OUTPATIENT)
Dept: CARDIOLOGY | Facility: CLINIC | Age: 38
Discharge: HOME | End: 2018-08-01
Payer: COMMERCIAL

## 2018-08-01 ENCOUNTER — CLINICAL SUPPORT (OUTPATIENT)
Dept: ELECTROPHYSIOLOGY | Facility: CLINIC | Age: 38
End: 2018-08-01
Attending: INTERNAL MEDICINE
Payer: COMMERCIAL

## 2018-08-01 ENCOUNTER — OFFICE VISIT (OUTPATIENT)
Dept: ELECTROPHYSIOLOGY | Facility: CLINIC | Age: 38
End: 2018-08-01
Payer: COMMERCIAL

## 2018-08-01 VITALS
HEART RATE: 78 BPM | HEIGHT: 62 IN | BODY MASS INDEX: 20.82 KG/M2 | WEIGHT: 113.13 LBS | DIASTOLIC BLOOD PRESSURE: 68 MMHG | SYSTOLIC BLOOD PRESSURE: 126 MMHG

## 2018-08-01 DIAGNOSIS — I49.01 VENTRICULAR FIBRILLATION: ICD-10-CM

## 2018-08-01 DIAGNOSIS — Z95.810 AUTOMATIC IMPLANTABLE CARDIOVERTER-DEFIBRILLATOR IN SITU: Primary | ICD-10-CM

## 2018-08-01 DIAGNOSIS — Z95.810 ICD (IMPLANTABLE CARDIOVERTER-DEFIBRILLATOR) IN PLACE: Primary | ICD-10-CM

## 2018-08-01 DIAGNOSIS — I45.81 LONG Q-T SYNDROME: ICD-10-CM

## 2018-08-01 DIAGNOSIS — I49.01 VENTRICULAR FIBRILLATION: Primary | ICD-10-CM

## 2018-08-01 DIAGNOSIS — Z95.810 ICD (IMPLANTABLE CARDIOVERTER-DEFIBRILLATOR) IN PLACE: ICD-10-CM

## 2018-08-01 PROCEDURE — 99214 OFFICE O/P EST MOD 30 MIN: CPT | Mod: S$GLB,,, | Performed by: INTERNAL MEDICINE

## 2018-08-01 PROCEDURE — 93283 PRGRMG EVAL IMPLANTABLE DFB: CPT | Mod: S$GLB,,, | Performed by: INTERNAL MEDICINE

## 2018-08-01 PROCEDURE — 93000 ELECTROCARDIOGRAM COMPLETE: CPT | Mod: S$GLB,,, | Performed by: INTERNAL MEDICINE

## 2018-08-01 PROCEDURE — 99999 PR PBB SHADOW E&M-EST. PATIENT-LVL III: CPT | Mod: PBBFAC,,, | Performed by: INTERNAL MEDICINE

## 2018-08-01 PROCEDURE — 3008F BODY MASS INDEX DOCD: CPT | Mod: CPTII,S$GLB,, | Performed by: INTERNAL MEDICINE

## 2018-08-07 ENCOUNTER — TELEPHONE (OUTPATIENT)
Dept: ELECTROPHYSIOLOGY | Facility: CLINIC | Age: 38
End: 2018-08-07

## 2018-08-07 NOTE — TELEPHONE ENCOUNTER
(approximately this time)- returned patient call and she stated she would call back after her meeting.    ----- Message from Darline Shah sent at 8/7/2018  9:03 AM CDT -----  Contact: Patient      ----- Message -----  From: Divina Paredes  Sent: 8/6/2018   2:08 PM  To: [facility] Arrhythmia Device Staff    The Pt is calling to report that her battery on her device is going out. Please call her for a appointment. Thanks, Divina

## 2018-08-08 NOTE — TELEPHONE ENCOUNTER
Patient stated she was here in the clinic a week ago and was told her device generator will need to be change out soon. She stated she was told when she hears it beeping to call the office to set up her change out. I informed her that the beeping she is hearing is the device trying to connect to her home monitor to tell us something. That something just may be that her device is at BRANDO. I asked her to connect to her monitor and send a manual transmission. She informed me that she does not have time for that right now. She stated she is spending time with her son and will call me back tomorrow.   ----- Message -----  From: Divina Paredes  Sent: 8/8/2018   1:48 PM  To: [facility] Arrhythmia Device Staff    The Pt is calling again to schedule her appointment to come in for her battery change on her device. Please call her back @ [phone]. Thanks,Divina

## 2018-08-09 ENCOUNTER — TELEPHONE (OUTPATIENT)
Dept: ELECTROPHYSIOLOGY | Facility: CLINIC | Age: 38
End: 2018-08-09

## 2018-08-09 NOTE — TELEPHONE ENCOUNTER
Patient did not have a missed call from me but I did call her back. No answer. I was not able to leave a voice message.     - Message from Yojana Barreto sent at 8/9/2018 12:36 PM CDT -----  Contact: Pt  Pt returning a missed call.    Thanks

## 2018-08-16 ENCOUNTER — TELEPHONE (OUTPATIENT)
Dept: ELECTROPHYSIOLOGY | Facility: CLINIC | Age: 38
End: 2018-08-16

## 2018-08-16 ENCOUNTER — CLINICAL SUPPORT (OUTPATIENT)
Dept: ELECTROPHYSIOLOGY | Facility: CLINIC | Age: 38
End: 2018-08-16
Payer: COMMERCIAL

## 2018-08-16 DIAGNOSIS — I45.81 LONG Q-T SYNDROME: ICD-10-CM

## 2018-08-16 DIAGNOSIS — Z95.810 ICD (IMPLANTABLE CARDIOVERTER-DEFIBRILLATOR) IN PLACE: ICD-10-CM

## 2018-08-16 PROCEDURE — 93295 DEV INTERROG REMOTE 1/2/MLT: CPT | Mod: S$GLB,,, | Performed by: INTERNAL MEDICINE

## 2018-08-16 PROCEDURE — 93296 REM INTERROG EVL PM/IDS: CPT | Mod: S$GLB,,, | Performed by: INTERNAL MEDICINE

## 2018-08-16 NOTE — TELEPHONE ENCOUNTER
Patient re-connected to her remote ICD monitor last night. Her device triggered an BRANDO alert on 08/04/2018. We just got the alert because she was not connected to her monitor. I called patient to let her know this. No answer. I left a voice message for her to return my call.

## 2018-08-17 ENCOUNTER — TELEPHONE (OUTPATIENT)
Dept: ELECTROPHYSIOLOGY | Facility: CLINIC | Age: 38
End: 2018-08-17

## 2018-08-17 NOTE — TELEPHONE ENCOUNTER
I spoke with patient and informed her that her device is at BRANDO. I informed her that the nurse or MA will be contacting her to schedule her device change out. Patient stated understanding.

## 2018-08-27 ENCOUNTER — TELEPHONE (OUTPATIENT)
Dept: ELECTROPHYSIOLOGY | Facility: CLINIC | Age: 38
End: 2018-08-27

## 2018-08-27 DIAGNOSIS — I49.01 VENTRICULAR FIBRILLATION: Primary | ICD-10-CM

## 2018-08-27 NOTE — TELEPHONE ENCOUNTER
----- Message from Olamide RODRIGEZPhil Greenfield sent at 8/27/2018 12:29 PM CDT -----  Contact: patient  I spoke with patient and informed her that the Nurse would be calling her back later today. She is calling to schedule her generator change out.   ----- Message -----  From: Divina Paredes  Sent: 8/27/2018  11:37 AM  To: [facility] Arrhythmia Device Staff    The Pt is calling to schedule her appointment to have her pacemaker battery changed out. Please call her back @ [phone]. Thanks, Divina

## 2018-08-27 NOTE — PROGRESS NOTES
DEVICE EDUCATION CHECKLIST  ICD Generator Change      PRE PROCEDURE TESTING     9/17/18 @ 9:15 AM (8:30 was not available)  Pre-procedure labs have been ordered for you at:  [facility] - [address]/ 2nd Floor  · Be sure to arrive at your scheduled time. YOU DO NOT HAVE TO FAST FOR THIS LAB WORK!        DAY OF PROCEDURE    9/20/2018 @ 8 AM   Report to Cardiology Waiting Room on the 3rd floor of the Hospital  · Wash your chest with HIBICLENS OR AN ANTIBACTERIAL SOAP (such as Dial) on the night before and the morning of your procedure.  · Please do not wear makeup, especially mascara, when arriving for your procedure.  · Do not eat or drink anything after 12 mn on the night before your procedure    Medications  · You may take your other usual morning medications with a sip of water.    Directions to Cardiology Waiting Room  If you park in the Parking Garage:  Take elevators to the 2nd floor  Walk up ramp and turn right by Gold Elevators  Take elevator to the 3rd floor  Upon exiting the elevator, turn away from the clinic areas  Walk long herron around to front of hospital to area with windows overlooking [facility]  Check in at Reception Desk  OR  If family is dropping you off:  Have them drop you off at the front of the Hospital  (Near the ER, where all the flags are hung).  Take the E elevators to the 3rd floor.  Check in at the Reception Desk in the waiting room.        · YOU WILL BE GOING HOME AFTER YOUR PROCEDURE  · YOU WILL NEED SOMEONE TO DRIVE YOU HOME AFTER YOUR PROCEDURE  · YOUR PAIN DURING YOUR PROCEDURE WILL BE MANAGED BY THE ANESTHESIA TEAM    Any need to reschedule or cancel procedures, or any questions regarding your procedures should be addressed directly with the Arrhythmia Department Nurses at the following phone number: [phone]

## 2018-08-27 NOTE — TELEPHONE ENCOUNTER
----- Message from Olamide RODRIGEZPhil Greenfield sent at 8/27/2018 12:29 PM CDT -----  Contact: patient  I spoke with patient and informed her that the Nurse would be calling her back later today. She is calling to schedule her generator change out.   ----- Message -----  From: Divina Paredes  Sent: 8/27/2018  11:37 AM  To: [facility] Arrhythmia Device Staff    The Pt is calling to schedule her appointment to have her pacemaker battery changed out. Please call her back @ [phone]. Thanks, Divina

## 2018-08-27 NOTE — TELEPHONE ENCOUNTER
Spoke with patient and scheduled generator change for 9/20/18. Will email all information to her at [email or web address]

## 2018-08-28 ENCOUNTER — TELEPHONE (OUTPATIENT)
Dept: ELECTROPHYSIOLOGY | Facility: CLINIC | Age: 38
End: 2018-08-28

## 2018-08-28 NOTE — TELEPHONE ENCOUNTER
----- Message from Flex Bryson MD sent at 8/28/2018  1:28 PM CDT -----  yes  ----- Message -----  From: Augusta Garcia, RN  Sent: 8/27/2018   1:17 PM  To: Flex Bryson MD    I scheduled gen change for 9/20. Are we staying with MDT?  ----- Message -----  From: Olamide Greenfield  Sent: 8/16/2018  10:50 AM  To: Flex Bryson MD, Augusta Garcia, RN, #    Patient has a dual chamber AICD. She finally re-connected to her home monitor. Her ICD has alerted that she has been at BRANDO since 08/04/2018. She is not dependant. I called the patient to let her know. No answer. I left a voice message for her to return my call.

## 2018-09-14 ENCOUNTER — TELEPHONE (OUTPATIENT)
Dept: OPHTHALMOLOGY | Facility: CLINIC | Age: 38
End: 2018-09-14

## 2018-09-14 NOTE — TELEPHONE ENCOUNTER
Arainna called to updated her contact lens rx.  She states that her vision is stable and had a full eye exam with Dr. Grey without the contact lens exam.   Arianna would like for the contact lens department to call her and mail lenses to her house.

## 2018-09-17 ENCOUNTER — LAB VISIT (OUTPATIENT)
Dept: LAB | Facility: HOSPITAL | Age: 38
End: 2018-09-17
Attending: INTERNAL MEDICINE
Payer: COMMERCIAL

## 2018-09-17 DIAGNOSIS — I49.01 VENTRICULAR FIBRILLATION: ICD-10-CM

## 2018-09-17 LAB
ANION GAP SERPL CALC-SCNC: 8 MMOL/L
APTT BLDCRRT: 23.1 SEC
BASOPHILS # BLD AUTO: 0.05 K/UL
BASOPHILS NFR BLD: 0.7 %
BUN SERPL-MCNC: 14 MG/DL
CALCIUM SERPL-MCNC: 9.5 MG/DL
CHLORIDE SERPL-SCNC: 102 MMOL/L
CO2 SERPL-SCNC: 29 MMOL/L
CREAT SERPL-MCNC: 0.9 MG/DL
DIFFERENTIAL METHOD: ABNORMAL
EOSINOPHIL # BLD AUTO: 0.3 K/UL
EOSINOPHIL NFR BLD: 3.7 %
ERYTHROCYTE [DISTWIDTH] IN BLOOD BY AUTOMATED COUNT: 11.7 %
EST. GFR  (AFRICAN AMERICAN): >60 ML/MIN/1.73 M^2
EST. GFR  (NON AFRICAN AMERICAN): >60 ML/MIN/1.73 M^2
GLUCOSE SERPL-MCNC: 111 MG/DL
HCT VFR BLD AUTO: 40.8 %
HGB BLD-MCNC: 13.9 G/DL
INR PPP: 0.9
LYMPHOCYTES # BLD AUTO: 1.6 K/UL
LYMPHOCYTES NFR BLD: 22.8 %
MCH RBC QN AUTO: 34 PG
MCHC RBC AUTO-ENTMCNC: 34.1 G/DL
MCV RBC AUTO: 100 FL
MONOCYTES # BLD AUTO: 0.7 K/UL
MONOCYTES NFR BLD: 9.6 %
NEUTROPHILS # BLD AUTO: 4.4 K/UL
NEUTROPHILS NFR BLD: 63.1 %
PLATELET # BLD AUTO: 162 K/UL
PMV BLD AUTO: 10.6 FL
POTASSIUM SERPL-SCNC: 4.2 MMOL/L
PROTHROMBIN TIME: 9.3 SEC
RBC # BLD AUTO: 4.09 M/UL
SODIUM SERPL-SCNC: 139 MMOL/L
WBC # BLD AUTO: 7.05 K/UL

## 2018-09-17 PROCEDURE — 85730 THROMBOPLASTIN TIME PARTIAL: CPT

## 2018-09-17 PROCEDURE — 36415 COLL VENOUS BLD VENIPUNCTURE: CPT

## 2018-09-17 PROCEDURE — 80048 BASIC METABOLIC PNL TOTAL CA: CPT

## 2018-09-17 PROCEDURE — 85610 PROTHROMBIN TIME: CPT

## 2018-09-17 PROCEDURE — 85025 COMPLETE CBC W/AUTO DIFF WBC: CPT

## 2018-09-19 ENCOUNTER — TELEPHONE (OUTPATIENT)
Dept: ELECTROPHYSIOLOGY | Facility: CLINIC | Age: 38
End: 2018-09-19

## 2018-09-19 ENCOUNTER — OFFICE VISIT (OUTPATIENT)
Dept: DERMATOLOGY | Facility: CLINIC | Age: 38
End: 2018-09-19
Payer: COMMERCIAL

## 2018-09-19 DIAGNOSIS — Z12.83 SCREENING EXAM FOR SKIN CANCER: ICD-10-CM

## 2018-09-19 DIAGNOSIS — D22.9 MULTIPLE BENIGN NEVI: Primary | ICD-10-CM

## 2018-09-19 DIAGNOSIS — Z87.898 HISTORY OF ATYPICAL NEVUS: ICD-10-CM

## 2018-09-19 PROCEDURE — 99214 OFFICE O/P EST MOD 30 MIN: CPT | Mod: S$GLB,,, | Performed by: DERMATOLOGY

## 2018-09-19 PROCEDURE — 99999 PR PBB SHADOW E&M-EST. PATIENT-LVL II: CPT | Mod: PBBFAC,,, | Performed by: DERMATOLOGY

## 2018-09-19 NOTE — PROGRESS NOTES
"  Subjective:       Patient ID:  Arianna Duval is a 37 y.o. female who presents for   Chief Complaint   Patient presents with    Skin Check     tbse     History of Present Illness: The patient presents for follow up of skin check.    The patient was last seen on: 8/15/17. H/o moderately atypical nevus- right 4th toe excised by EF    Patient is here today for a "mole" check.   Pt has a history of  moderate sun exposure in the past.   Pt recalls several blistering sunburns in the past- 1  Pt has history of tanning bed use- yes  Pt has  had moles removed in the past- yes H/o moderately atypical nevus- right 4th toe excised by EF 6/2015  Pt has history of melanoma in first degree relatives-  None    No new or concerning lesions          Review of Systems   Skin: Positive for daily sunscreen use and activity-related sunscreen use. Negative for recent sunburn.   Hematologic/Lymphatic: Does not bruise/bleed easily (has defibrillator).        Objective:    Physical Exam   Constitutional: She appears well-developed and well-nourished. No distress.   Neurological: She is alert and oriented to person, place, and time. She is not disoriented.   Psychiatric: She has a normal mood and affect.   Skin:   Areas Examined (abnormalities noted in diagram):   Scalp / Hair Palpated and Inspected  Head / Face Inspection Performed  Neck Inspection Performed  Chest / Axilla Inspection Performed  Abdomen Inspection Performed  Genitals / Buttocks / Groin Inspection Performed  Back Inspection Performed  RUE Inspected  LUE Inspection Performed  RLE Inspected  LLE Inspection Performed  Nails and Digits Inspection Performed                       Diagram Legend     Erythematous scaling macule/papule c/w actinic keratosis       Vascular papule c/w angioma      Pigmented verrucoid papule/plaque c/w seborrheic keratosis      Yellow umbilicated papule c/w sebaceous hyperplasia      Irregularly shaped tan macule c/w lentigo     1-2 mm smooth " white papules consistent with Milia      Movable subcutaneous cyst with punctum c/w epidermal inclusion cyst      Subcutaneous movable cyst c/w pilar cyst      Firm pink to brown papule c/w dermatofibroma      Pedunculated fleshy papule(s) c/w skin tag(s)      Evenly pigmented macule c/w junctional nevus     Mildly variegated pigmented, slightly irregular-bordered macule c/w mildly atypical nevus      Flesh colored to evenly pigmented papule c/w intradermal nevus       Pink pearly papule/plaque c/w basal cell carcinoma      Erythematous hyperkeratotic cursted plaque c/w SCC      Surgical scar with no sign of skin cancer recurrence      Open and closed comedones      Inflammatory papules and pustules      Verrucoid papule consistent consistent with wart     Erythematous eczematous patches and plaques     Dystrophic onycholytic nail with subungual debris c/w onychomycosis     Umbilicated papule    Erythematous-base heme-crusted tan verrucoid plaque consistent with inflamed seborrheic keratosis     Erythematous Silvery Scaling Plaque c/w Psoriasis     See annotation      Assessment / Plan:        Multiple benign nevi  total body skin examination performed today including at least 12 points as noted in physical examination. No lesions suspicious for malignancy noted.  Reassurance provided.  Instructed patient to observe lesion(s) for changes and follow up in clinic if changes are noted. Discussed ABCDE's of moles and brochure provided.    New mole left 3rd toe - will observe    History of atypical nevus and here for Screening exam for skin cancer  Area of previous atypical nevus examined. Site well healed with no signs of recurrence.    Total body skin examination performed today including at least 12 points as noted in physical examination. No lesions suspicious for malignancy noted.               Follow-up in about 1 year (around 9/19/2019).

## 2018-09-19 NOTE — TELEPHONE ENCOUNTER
Attempted to contact patient to confirm pre-op instructions for ICD generator change tomorrow. Mailbox is full and I am unable to leave a message. Will try back later.

## 2018-09-20 ENCOUNTER — ANESTHESIA (OUTPATIENT)
Dept: MEDSURG UNIT | Facility: HOSPITAL | Age: 38
End: 2018-09-20
Payer: COMMERCIAL

## 2018-09-20 ENCOUNTER — HOSPITAL ENCOUNTER (OUTPATIENT)
Facility: HOSPITAL | Age: 38
Discharge: HOME | End: 2018-09-20
Attending: INTERNAL MEDICINE | Admitting: INTERNAL MEDICINE
Payer: COMMERCIAL

## 2018-09-20 ENCOUNTER — ANESTHESIA EVENT (OUTPATIENT)
Dept: MEDSURG UNIT | Facility: HOSPITAL | Age: 38
End: 2018-09-20
Payer: COMMERCIAL

## 2018-09-20 VITALS
HEART RATE: 61 BPM | HEIGHT: 62 IN | WEIGHT: 107 LBS | SYSTOLIC BLOOD PRESSURE: 121 MMHG | RESPIRATION RATE: 16 BRPM | OXYGEN SATURATION: 100 % | DIASTOLIC BLOOD PRESSURE: 66 MMHG | BODY MASS INDEX: 19.69 KG/M2 | TEMPERATURE: 98 F

## 2018-09-20 DIAGNOSIS — Z45.02 ENCOUNTER FOR ADJUSTMENT OR MANAGEMENT OF AUTOMATIC IMPLANTABLE CARDIOVERTER-DEFIBRILLATOR: ICD-10-CM

## 2018-09-20 DIAGNOSIS — I45.81 LONG QT SYNDROME: ICD-10-CM

## 2018-09-20 DIAGNOSIS — Z45.02 IMPLANTABLE CARDIOVERTER-DEFIBRILLATOR (ICD) AT END OF BATTERY LIFE: Primary | ICD-10-CM

## 2018-09-20 LAB
B-HCG UR QL: NEGATIVE
CTP QC/QA: YES

## 2018-09-20 PROCEDURE — 93005 ELECTROCARDIOGRAM TRACING: CPT

## 2018-09-20 PROCEDURE — 37000008 HC ANESTHESIA 1ST 15 MINUTES: Performed by: INTERNAL MEDICINE

## 2018-09-20 PROCEDURE — 25000003 PHARM REV CODE 250: Performed by: NURSE PRACTITIONER

## 2018-09-20 PROCEDURE — 63600175 PHARM REV CODE 636 W HCPCS: Performed by: NURSE PRACTITIONER

## 2018-09-20 PROCEDURE — 25000003 PHARM REV CODE 250: Performed by: NURSE ANESTHETIST, CERTIFIED REGISTERED

## 2018-09-20 PROCEDURE — 63600175 PHARM REV CODE 636 W HCPCS: Performed by: NURSE ANESTHETIST, CERTIFIED REGISTERED

## 2018-09-20 PROCEDURE — S0020 INJECTION, BUPIVICAINE HYDRO: HCPCS

## 2018-09-20 PROCEDURE — 81025 URINE PREGNANCY TEST: CPT | Performed by: NURSE PRACTITIONER

## 2018-09-20 PROCEDURE — 37000009 HC ANESTHESIA EA ADD 15 MINS: Performed by: INTERNAL MEDICINE

## 2018-09-20 PROCEDURE — 33263 RMVL & RPLCMT DFB GEN 2 LEAD: CPT | Mod: ,,, | Performed by: INTERNAL MEDICINE

## 2018-09-20 PROCEDURE — C1721 AICD, DUAL CHAMBER: HCPCS

## 2018-09-20 PROCEDURE — 63600175 PHARM REV CODE 636 W HCPCS

## 2018-09-20 PROCEDURE — D9220A PRA ANESTHESIA: Mod: CRNA,,, | Performed by: NURSE ANESTHETIST, CERTIFIED REGISTERED

## 2018-09-20 PROCEDURE — 25000003 PHARM REV CODE 250

## 2018-09-20 PROCEDURE — D9220A PRA ANESTHESIA: Mod: ANES,,, | Performed by: ANESTHESIOLOGY

## 2018-09-20 PROCEDURE — 93010 ELECTROCARDIOGRAM REPORT: CPT | Mod: ,,, | Performed by: INTERNAL MEDICINE

## 2018-09-20 RX ORDER — MIDAZOLAM HYDROCHLORIDE 1 MG/ML
INJECTION, SOLUTION INTRAMUSCULAR; INTRAVENOUS
Status: DISCONTINUED | OUTPATIENT
Start: 2018-09-20 | End: 2018-09-20

## 2018-09-20 RX ORDER — LIDOCAINE HCL/PF 100 MG/5ML
SYRINGE (ML) INTRAVENOUS
Status: DISCONTINUED | OUTPATIENT
Start: 2018-09-20 | End: 2018-09-20

## 2018-09-20 RX ORDER — EPHEDRINE SULFATE 50 MG/ML
INJECTION, SOLUTION INTRAVENOUS
Status: DISCONTINUED | OUTPATIENT
Start: 2018-09-20 | End: 2018-09-20

## 2018-09-20 RX ORDER — DIPHENHYDRAMINE HYDROCHLORIDE 50 MG/ML
25 INJECTION INTRAMUSCULAR; INTRAVENOUS EVERY 6 HOURS PRN
Status: DISCONTINUED | OUTPATIENT
Start: 2018-09-20 | End: 2018-09-20 | Stop reason: HOSPADM

## 2018-09-20 RX ORDER — CEFAZOLIN SODIUM 1 G/3ML
2 INJECTION, POWDER, FOR SOLUTION INTRAMUSCULAR; INTRAVENOUS
Status: COMPLETED | OUTPATIENT
Start: 2018-09-20 | End: 2018-09-20

## 2018-09-20 RX ORDER — PROPOFOL 10 MG/ML
VIAL (ML) INTRAVENOUS CONTINUOUS PRN
Status: DISCONTINUED | OUTPATIENT
Start: 2018-09-20 | End: 2018-09-20

## 2018-09-20 RX ORDER — FENTANYL CITRATE 50 UG/ML
INJECTION, SOLUTION INTRAMUSCULAR; INTRAVENOUS
Status: DISCONTINUED | OUTPATIENT
Start: 2018-09-20 | End: 2018-09-20

## 2018-09-20 RX ORDER — KETAMINE HYDROCHLORIDE 10 MG/ML
INJECTION, SOLUTION INTRAMUSCULAR; INTRAVENOUS
Status: DISCONTINUED | OUTPATIENT
Start: 2018-09-20 | End: 2018-09-20

## 2018-09-20 RX ORDER — FENTANYL CITRATE 50 UG/ML
25 INJECTION, SOLUTION INTRAMUSCULAR; INTRAVENOUS EVERY 5 MIN PRN
Status: DISCONTINUED | OUTPATIENT
Start: 2018-09-20 | End: 2018-09-20 | Stop reason: HOSPADM

## 2018-09-20 RX ORDER — ACETAMINOPHEN AND CODEINE PHOSPHATE 300; 30 MG/1; MG/1
1 TABLET ORAL
Qty: 10 TABLET | Refills: 0 | Status: SHIPPED | OUTPATIENT
Start: 2018-09-20 | End: 2018-09-20 | Stop reason: SDUPTHER

## 2018-09-20 RX ORDER — CEPHALEXIN 500 MG/1
500 CAPSULE ORAL EVERY 8 HOURS
Qty: 15 CAPSULE | Refills: 0 | Status: SHIPPED | OUTPATIENT
Start: 2018-09-20 | End: 2018-09-25

## 2018-09-20 RX ORDER — SODIUM CHLORIDE 9 MG/ML
INJECTION, SOLUTION INTRAVENOUS CONTINUOUS
Status: DISCONTINUED | OUTPATIENT
Start: 2018-09-20 | End: 2021-03-01

## 2018-09-20 RX ORDER — ACETAMINOPHEN AND CODEINE PHOSPHATE 300; 30 MG/1; MG/1
1 TABLET ORAL EVERY 6 HOURS PRN
Qty: 10 TABLET | Refills: 0 | Status: SHIPPED | OUTPATIENT
Start: 2018-09-20 | End: 2018-12-17

## 2018-09-20 RX ORDER — PHENYLEPHRINE HYDROCHLORIDE 10 MG/ML
INJECTION INTRAVENOUS
Status: DISCONTINUED | OUTPATIENT
Start: 2018-09-20 | End: 2018-09-20

## 2018-09-20 RX ORDER — PROPOFOL 10 MG/ML
VIAL (ML) INTRAVENOUS
Status: DISCONTINUED | OUTPATIENT
Start: 2018-09-20 | End: 2018-09-20

## 2018-09-20 RX ADMIN — PROPOFOL 50 MG: 10 INJECTION, EMULSION INTRAVENOUS at 09:09

## 2018-09-20 RX ADMIN — EPHEDRINE SULFATE 5 MG: 50 INJECTION, SOLUTION INTRAMUSCULAR; INTRAVENOUS; SUBCUTANEOUS at 10:09

## 2018-09-20 RX ADMIN — FENTANYL CITRATE 25 MCG: 50 INJECTION, SOLUTION INTRAMUSCULAR; INTRAVENOUS at 10:09

## 2018-09-20 RX ADMIN — EPHEDRINE SULFATE 10 MG: 50 INJECTION, SOLUTION INTRAMUSCULAR; INTRAVENOUS; SUBCUTANEOUS at 10:09

## 2018-09-20 RX ADMIN — FENTANYL CITRATE 25 MCG: 50 INJECTION, SOLUTION INTRAMUSCULAR; INTRAVENOUS at 09:09

## 2018-09-20 RX ADMIN — MIDAZOLAM 2 MG: 1 INJECTION INTRAMUSCULAR; INTRAVENOUS at 09:09

## 2018-09-20 RX ADMIN — LIDOCAINE HYDROCHLORIDE 50 MG: 20 INJECTION, SOLUTION INTRAVENOUS at 09:09

## 2018-09-20 RX ADMIN — PHENYLEPHRINE HYDROCHLORIDE 100 MCG: 10 INJECTION INTRAVENOUS at 10:09

## 2018-09-20 RX ADMIN — PROPOFOL 150 MCG/KG/MIN: 10 INJECTION, EMULSION INTRAVENOUS at 09:09

## 2018-09-20 RX ADMIN — SODIUM CHLORIDE: 0.9 INJECTION, SOLUTION INTRAVENOUS at 09:09

## 2018-09-20 RX ADMIN — KETAMINE HYDROCHLORIDE 20 MG: 10 INJECTION, SOLUTION INTRAMUSCULAR; INTRAVENOUS at 09:09

## 2018-09-20 RX ADMIN — CEFAZOLIN 2 G: 330 INJECTION, POWDER, FOR SOLUTION INTRAMUSCULAR; INTRAVENOUS at 09:09

## 2018-09-20 RX ADMIN — PROPOFOL 40 MG: 10 INJECTION, EMULSION INTRAVENOUS at 10:09

## 2018-09-20 NOTE — DISCHARGE SUMMARY
[facility]  Cardiac Electrophysiology  Discharge Summary      Patient Name: Arianna Duval  MRN: [id number]  Admission Date: 9/20/2018  Hospital Length of Stay: 0 days  Discharge Date and Time:  09/20/2018 1:53 PM  Attending Physician: Flex Bryson MD  Discharging Provider: Mary Camacho NP  Primary Care Physician: Zunilda Beckett MD    HPI: The patient is a 37 y.o. 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 Hyman). The patient last had the device Interrogated (8/1/2018) which revealed an intrinsic sinus rhythm with stable lead and device function. No arrhythmias or treated episodes were noted at this time. She  was pacing 18% in the RA and 0% in the RV. Battery 2.61V, was nearing BRANDO and alarm was demonstrated. According to her home monitor, her ICD alerted that she has been at BRANDO since 08/04/2018. She is not dependant. She presents today to SSCU for generator change. The patient denies any acute symptoms such as chest pain on exertion or at rest, palpitations, SOB, dyspnea on exertion, dizziness, syncope, or near syncopal episodes.     I have personally reviewed the patient's EKG today, which shows NSR 60 BPM.    Procedure(s) (LRB):  REPLACEMENT, PULSE GENERATOR, ICD (N/A)     Hospital Course:  Patient underwent Device Generator Change, tolerated procedure well with no acute complications noted. Left pectoral site with aquacel dressing CDI, no bleeding or hematoma noted.   Post procedure EKG showed NSR.  Patient to continue antibiotics-Keflex for 5 days. Acetaminophen-codeine 300-30mg every 6 hours PRN for pain.   Aquacel dressing to remain intact until patient returns for wound clinic visit. Patient to follow up with device clinic in 1 week for wound check and 3 months with Dr. Bryson.  Discharge plans/instructions discussed with patient and mother who  verbalized understanding and agreement of plans of care. No further questions or concerns  voiced at this time.     Consults:   Anesthesia.    Final Active Diagnoses:    Diagnosis Date Noted POA    PRINCIPAL PROBLEM:  Implantable cardioverter-defibrillator (ICD) at end of battery life [Z45.09] 09/20/2018 Not Applicable      Problems Resolved During this Admission:       Discharged Condition: good    Disposition: Home or Self Care    Follow Up:  Follow-up Information     Flex Bryson MD In 3 months.    Specialties:  Electrophysiology, Cardiology  Contact information:  [facility] [id number] [phone]             WOUND CHECK, [facility] In 1 week.               Patient Instructions:      Diet Adult Regular     No driving until:   Scheduling Instructions: No driving for 24-48 hours post procedure.     Lifting restrictions   Scheduling Instructions: No lifting anything greater than 5 pounds for 1 week.     Notify your health care provider if you experience any of the following:  temperature >100.4     Notify your health care provider if you experience any of the following:  persistent nausea and vomiting or diarrhea     Notify your health care provider if you experience any of the following:  severe uncontrolled pain     Notify your health care provider if you experience any of the following:  redness, tenderness, or signs of infection (pain, swelling, redness, odor or green/yellow discharge around incision site)     Notify your health care provider if you experience any of the following:  difficulty breathing or increased cough     Notify your health care provider if you experience any of the following:  severe persistent headache     Notify your health care provider if you experience any of the following:  worsening rash     Notify your health care provider if you experience any of the following:  increased confusion or weakness     Notify your health care provider if you experience any of the  following:  persistent dizziness, light-headedness, or visual disturbances     Notify your health care provider if you experience any of the following:   Scheduling Instructions: For any concerning medical symptoms.     Leave dressing on - Keep it clean, dry, and intact until clinic visit     Activity as tolerated     Medications:  Reconciled Home Medications:      Medication List      START taking these medications    acetaminophen-codeine 300-30mg 300-30 mg Tab  Commonly known as:  TYLENOL #3  Take 1 tablet by mouth every 6 (six) hours as needed (for pain).     cephALEXin 500 MG capsule  Commonly known as:  KEFLEX  Take 1 capsule (500 mg total) by mouth every 8 (eight) hours. for 5 days        CONTINUE taking these medications    metoprolol succinate 25 MG 24 hr tablet  Commonly known as:  TOPROL-XL  Take 1 tablet (25 mg total) by mouth once daily.     potassium chloride SA 20 MEQ tablet  Commonly known as:  K-DUR,KLOR-CON  Take 1 tablet (20 mEq total) by mouth once daily.            Time spent on the discharge of patient: 17 minutes    Mary Camacho NP  Cardiac Electrophysiology  [facility]    STAFF: Flex Bryson MD

## 2018-09-20 NOTE — ANESTHESIA PREPROCEDURE EVALUATION
09/20/2018  Arianna Duval is a 37 y.o., female.  Patient Active Problem List   Diagnosis    Long Q-T syndrome    Ventricular fibrillation    Automatic implantable cardioverter-defibrillator in situ    Prolonged QT interval syndrome    Spontaneous vaginal delivery    Myopia    Corneal scar, right eye    Implantable cardioverter-defibrillator (ICD) at end of battery life         Anesthesia Evaluation         Review of Systems      Physical Exam  General:  Well nourished    Airway/Jaw/Neck:  Airway Findings: Mouth Opening: Normal Tongue: Normal  General Airway Assessment: Adult  Mallampati: II  Improves to II with phonation.  TM Distance: Normal, at least 6 cm      Dental:  Dental Findings: In tact   Chest/Lungs:  Chest/Lungs Findings: Clear to auscultation     Heart/Vascular:  Heart Findings: Rate: Normal  Rhythm: Regular Rhythm  Sounds: Normal        Mental Status:  Mental Status Findings:  Cooperative, Alert and Oriented         Anesthesia Plan  Type of Anesthesia, risks & benefits discussed:  Anesthesia Type:  general  Patient's Preference: General  Intra-op Monitoring Plan: standard ASA monitors  Intra-op Monitoring Plan Comments: Standard ASA monitors.   Post Op Pain Control Plan: per primary service following discharge from PACU  Post Op Pain Control Plan Comments: Per primary service.     Induction:   IV  Beta Blocker:  Patient is not currently on a Beta-Blocker (No further documentation required).       Informed Consent: Patient understands risks and agrees with Anesthesia plan.  Questions answered. Anesthesia consent signed with patient.  ASA Score: 3     Day of Surgery Review of History & Physical:    H&P update referred to the surgeon.     Anesthesia Plan Notes: Chart reviewed, patient interviewed and examined.  The plan for general anesthesia was explained.  Questions were answered and  the consent was signed.  Lacey SARKAR         Ready For Surgery From Anesthesia Perspective.

## 2018-09-20 NOTE — SUBJECTIVE & OBJECTIVE
Past Medical History:   Diagnosis Date    Abnormal Pap smear     Abnormal Pap smear of cervix     Abnormal Pap smear of vagina     Acute blood loss anemia 8/31/2013    Defibrillator discharge     Long Q-T syndrome        Past Surgical History:   Procedure Laterality Date    breast reduction      CARDIAC PACEMAKER PLACEMENT      COLPOSCOPY         Review of patient's allergies indicates:   Allergen Reactions    Sulfa (sulfonamide antibiotics)        No current facility-administered medications on file prior to encounter.      Current Outpatient Medications on File Prior to Encounter   Medication Sig    metoprolol succinate (TOPROL-XL) 25 MG 24 hr tablet Take 1 tablet (25 mg total) by mouth once daily.    potassium chloride SA (K-DUR,KLOR-CON) 20 MEQ tablet Take 1 tablet (20 mEq total) by mouth once daily.     Family History     Problem Relation (Age of Onset)    Breast cancer Maternal Grandmother, Maternal Aunt    Long QT syndrome Mother, Brother        Tobacco Use    Smoking status: Never Smoker    Smokeless tobacco: Never Used   Substance and Sexual Activity    Alcohol use: Yes     Comment: socially    Drug use: No    Sexual activity: Yes     Partners: Male     Birth control/protection: None, Partner-Vasectomy     Review of Systems   Constitution: Negative for chills, fever and malaise/fatigue.   HENT: Negative for congestion.    Eyes: Negative for blurred vision.   Cardiovascular: Negative for chest pain, dyspnea on exertion, irregular heartbeat, leg swelling, near-syncope, orthopnea, palpitations and syncope.   Respiratory: Negative for cough, shortness of breath and wheezing.    Endocrine: Negative for polyphagia.   Hematologic/Lymphatic: Negative for bleeding problem.   Skin: Negative for rash.   Musculoskeletal: Negative for back pain, joint pain, joint swelling, muscle cramps and muscle weakness.   Gastrointestinal: Negative for bloating, abdominal pain, dysphagia, nausea and vomiting.    Genitourinary: Negative for dysuria.   Neurological: Negative for dizziness, headaches, loss of balance and numbness.   Psychiatric/Behavioral: Negative for altered mental status.     Objective:     Vital Signs (Most Recent):  Temp: 97.7 °F (36.5 °C) (09/20/18 0800)  Pulse: 60 (09/20/18 0800)  Resp: 16 (09/20/18 0800)  BP: 125/70 (09/20/18 0823)  SpO2: 100 % (09/20/18 0800) Vital Signs (24h Range):  Temp:  [97.7 °F (36.5 °C)] 97.7 °F (36.5 °C)  Pulse:  [60] 60  Resp:  [16] 16  SpO2:  [100 %] 100 %  BP: (114-125)/(70-72) 125/70       Weight: 48.5 kg (107 lb)  Body mass index is 19.57 kg/m².    SpO2: 100 %       Physical Exam   Constitutional: She is oriented to person, place, and time. She appears well-developed and well-nourished. No distress.   HENT:   Head: Normocephalic and atraumatic.   Eyes: Conjunctivae are normal. Pupils are equal, round, and reactive to light.   Neck: Normal range of motion. Neck supple.   Cardiovascular: Normal rate, regular rhythm, normal heart sounds and intact distal pulses. Exam reveals no gallop and no friction rub.   No murmur heard.  Pulmonary/Chest: Effort normal and breath sounds normal. No respiratory distress. She has no wheezes. She has no rales. She exhibits no tenderness.   Abdominal: Bowel sounds are normal. She exhibits no distension and no mass. There is no tenderness. There is no rebound and no guarding.   Musculoskeletal: Normal range of motion. She exhibits no edema.   Neurological: She is alert and oriented to person, place, and time.   Skin: Skin is warm and dry. No rash noted. She is not diaphoretic.   Psychiatric: She has a normal mood and affect.       Significant Labs: Reviewed.     Significant Imaging: Chart reviewed.

## 2018-09-20 NOTE — NURSING
Received via stretcher from PACU.  Report from VICKI Bush.  Awake and alert.  No c/o pain at present.  Resp even and unlabored.  Mother at bedside.  Tolerating fluids on arrival.  VSS.  Will continue to monitor.

## 2018-09-20 NOTE — HPI
HPI: The patient is a 37 y.o. 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 Hyman). The patient last had the device Interrogated (8/1/2018) which revealed an intrinsic sinus rhythm with stable lead and device function. No arrhythmias or treated episodes were noted at this time. She was pacing 18% in the RA and 0% in the RV. Battery 2.61V, was nearing BRANDO and alarm was demonstrated. According to her home monitor, her ICD alerted that she has been at BRANDO since 08/04/2018. She is not dependant. She presents today to SSCU for generator change. The patient denies any acute symptoms such as chest pain on exertion or at rest, palpitations, SOB, dyspnea on exertion, dizziness, syncope, or near syncopal episodes.     I have personally reviewed the patient's EKG today, which shows.....

## 2018-09-20 NOTE — ANESTHESIA POSTPROCEDURE EVALUATION
"Anesthesia Post Evaluation    Patient: Arianna Duval    Procedure(s) Performed: Procedure(s) (LRB):  REPLACEMENT, PULSE GENERATOR, ICD (N/A)    Final Anesthesia Type: general  Patient location during evaluation: PACU  Patient participation: Yes- Able to Participate  Level of consciousness: awake and alert  Post-procedure vital signs: reviewed and stable  Pain management: adequate  Airway patency: patent  PONV status at discharge: No PONV  Anesthetic complications: no      Cardiovascular status: hemodynamically stable  Respiratory status: unassisted  Hydration status: euvolemic  Follow-up not needed.        Visit Vitals  /66 (BP Location: Right arm, Patient Position: Lying)   Pulse 61   Temp 36.6 °C (97.9 °F)   Resp 16   Ht 5' 2" (1.575 m)   Wt 48.5 kg (107 lb)   SpO2 100%   Breastfeeding? No   BMI 19.57 kg/m²       Pain/Babar Score: Pain Assessment Performed: Yes (9/20/2018 11:35 AM)  Presence of Pain: denies (9/20/2018 11:35 AM)  Babar Score: 10 (9/20/2018 11:29 AM)        "

## 2018-09-20 NOTE — TRANSFER OF CARE
"Anesthesia Transfer of Care Note    Patient: Arianna Duval    Procedure(s) Performed: Procedure(s) (LRB):  REPLACEMENT, PULSE GENERATOR, ICD (N/A)    Patient location: PACU    Anesthesia Type: general    Transport from OR: Transported from OR on room air with adequate spontaneous ventilation    Post pain: adequate analgesia    Post assessment: no apparent anesthetic complications and tolerated procedure well    Post vital signs: stable    Level of consciousness: awake, alert and oriented    Nausea/Vomiting: no nausea/vomiting    Complications: none    Transfer of care protocol was followed      Last vitals:   Visit Vitals  /70 (BP Location: Left arm, Patient Position: Lying)   Pulse 60   Temp 36.5 °C (97.7 °F)   Resp 16   Ht 5' 2" (1.575 m)   Wt 48.5 kg (107 lb)   SpO2 100%   Breastfeeding? No   BMI 19.57 kg/m²     "

## 2018-09-20 NOTE — NURSING
IV's d/c'd x 2 with cath tips intact.  Dressing to left upper chest wall clean dry and intact. Pt and pt mother verbalized understanding of d/c instructions.  trf off unit via wheelchair for discharge home.

## 2018-09-20 NOTE — H&P
[facility]  Cardiac Electrophysiology  History and Physical     Admission Date: 9/20/2018  Code Status: Prior   Attending Provider: Flex Bryson MD   Principal Problem:Implantable cardioverter-defibrillator (ICD) at end of battery life    Subjective:     Chief Complaint:  Dual chamber ICD generator change.    HPI: The patient is a 37 y.o. 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 Hyman). The patient last had the device Interrogated (8/1/2018) which revealed an intrinsic sinus rhythm with stable lead and device function. No arrhythmias or treated episodes were noted at this time. She  was pacing 18% in the RA and 0% in the RV. Battery 2.61V, was nearing BRANDO and alarm was demonstrated. According to her home monitor, her ICD alerted that she has been at BRANDO since 08/04/2018. She is not dependant. She presents today to SSCU for generator change. The patient denies any acute symptoms such as chest pain on exertion or at rest, palpitations, SOB, dyspnea on exertion, dizziness, syncope, or near syncopal episodes. Left chest wall ICD site CDI.      I have personally reviewed the patient's EKG today, which shows NSR 60 BPM.      Past Medical History:   Diagnosis Date    Abnormal Pap smear     Abnormal Pap smear of cervix     Abnormal Pap smear of vagina     Acute blood loss anemia 8/31/2013    Defibrillator discharge     Long Q-T syndrome        Past Surgical History:   Procedure Laterality Date    breast reduction      CARDIAC PACEMAKER PLACEMENT      COLPOSCOPY         Review of patient's allergies indicates:   Allergen Reactions    Sulfa (sulfonamide antibiotics)        No current facility-administered medications on file prior to encounter.      Current Outpatient Medications on File Prior to Encounter   Medication Sig    metoprolol succinate (TOPROL-XL) 25 MG 24  hr tablet Take 1 tablet (25 mg total) by mouth once daily.    potassium chloride SA (K-DUR,KLOR-CON) 20 MEQ tablet Take 1 tablet (20 mEq total) by mouth once daily.     Family History     Problem Relation (Age of Onset)    Breast cancer Maternal Grandmother, Maternal Aunt    Long QT syndrome Mother, Brother        Tobacco Use    Smoking status: Never Smoker    Smokeless tobacco: Never Used   Substance and Sexual Activity    Alcohol use: Yes     Comment: socially    Drug use: No    Sexual activity: Yes     Partners: Male     Birth control/protection: None, Partner-Vasectomy     Review of Systems   Constitution: Negative for chills, fever and malaise/fatigue.   HENT: Negative for congestion.    Eyes: Negative for blurred vision.   Cardiovascular: Negative for chest pain, dyspnea on exertion, irregular heartbeat, leg swelling, near-syncope, orthopnea, palpitations and syncope.   Respiratory: Negative for cough, shortness of breath and wheezing.    Endocrine: Negative for polyphagia.   Hematologic/Lymphatic: Negative for bleeding problem.   Skin: Negative for rash.   Musculoskeletal: Negative for back pain, joint pain, joint swelling, muscle cramps and muscle weakness.   Gastrointestinal: Negative for bloating, abdominal pain, dysphagia, nausea and vomiting.   Genitourinary: Negative for dysuria.   Neurological: Negative for dizziness, headaches, loss of balance and numbness.   Psychiatric/Behavioral: Negative for altered mental status.     Objective:     Vital Signs (Most Recent):  Temp: 97.7 °F (36.5 °C) (09/20/18 0800)  Pulse: 60 (09/20/18 0800)  Resp: 16 (09/20/18 0800)  BP: 125/70 (09/20/18 0823)  SpO2: 100 % (09/20/18 0800) Vital Signs (24h Range):  Temp:  [97.7 °F (36.5 °C)] 97.7 °F (36.5 °C)  Pulse:  [60] 60  Resp:  [16] 16  SpO2:  [100 %] 100 %  BP: (114-125)/(70-72) 125/70       Weight: 48.5 kg (107 lb)  Body mass index is 19.57 kg/m².    SpO2: 100 %       Physical Exam   Constitutional: She is oriented  to person, place, and time. She appears well-developed and well-nourished. No distress.   HENT:   Head: Normocephalic and atraumatic.   Eyes: Conjunctivae are normal. Pupils are equal, round, and reactive to light.   Neck: Normal range of motion. Neck supple.   Cardiovascular: Normal rate, regular rhythm, normal heart sounds and intact distal pulses. Exam reveals no gallop and no friction rub.   No murmur heard.  Pulmonary/Chest: Effort normal and breath sounds normal. No respiratory distress. She has no wheezes. She has no rales. She exhibits no tenderness.   Abdominal: Bowel sounds are normal. She exhibits no distension and no mass. There is no tenderness. There is no rebound and no guarding.   Musculoskeletal: Normal range of motion. She exhibits no edema.   Neurological: She is alert and oriented to person, place, and time.   Skin: Skin is warm and dry. No rash noted. She is not diaphoretic. Scar to left chest wall from ICD, CDI.  Psychiatric: She has a normal mood and affect.       Significant Labs: Reviewed.     Significant Imaging: Chart reviewed.    Assessment and Plan:     * Implantable cardioverter-defibrillator (ICD) at end of battery life    Generator change of dual chamber ICD at BRANDO.   Anesthesia for sedation.           All questions answered. Mother at bedside. Consents signed. No further questions.      Mary Camacho NP  Cardiac Electrophysiology  [facility]

## 2018-10-04 ENCOUNTER — CLINICAL SUPPORT (OUTPATIENT)
Dept: ELECTROPHYSIOLOGY | Facility: CLINIC | Age: 38
End: 2018-10-04
Attending: INTERNAL MEDICINE
Payer: COMMERCIAL

## 2018-10-04 DIAGNOSIS — I49.01 VENTRICULAR FIBRILLATION: ICD-10-CM

## 2018-10-04 PROCEDURE — 93283 PRGRMG EVAL IMPLANTABLE DFB: CPT | Mod: S$GLB,,, | Performed by: INTERNAL MEDICINE

## 2018-12-13 RX ORDER — METOPROLOL SUCCINATE 25 MG/1
25 TABLET, EXTENDED RELEASE ORAL DAILY
Qty: 90 TABLET | Refills: 3 | Status: SHIPPED | OUTPATIENT
Start: 2018-12-13 | End: 2019-04-04 | Stop reason: SDUPTHER

## 2018-12-13 RX ORDER — POTASSIUM CHLORIDE 20 MEQ/1
20 TABLET, EXTENDED RELEASE ORAL DAILY
Qty: 90 TABLET | Refills: 3 | Status: SHIPPED | OUTPATIENT
Start: 2018-12-13 | End: 2019-04-04 | Stop reason: SDUPTHER

## 2018-12-17 ENCOUNTER — OFFICE VISIT (OUTPATIENT)
Dept: OBSTETRICS AND GYNECOLOGY | Facility: CLINIC | Age: 38
End: 2018-12-17
Attending: OBSTETRICS & GYNECOLOGY
Payer: COMMERCIAL

## 2018-12-17 VITALS
DIASTOLIC BLOOD PRESSURE: 78 MMHG | BODY MASS INDEX: 22.15 KG/M2 | SYSTOLIC BLOOD PRESSURE: 108 MMHG | HEIGHT: 62 IN | WEIGHT: 120.38 LBS

## 2018-12-17 DIAGNOSIS — Z01.419 WELL WOMAN EXAM: Primary | ICD-10-CM

## 2018-12-17 DIAGNOSIS — Z80.3 FAMILY HISTORY OF BREAST CANCER: ICD-10-CM

## 2018-12-17 PROCEDURE — 87624 HPV HI-RISK TYP POOLED RSLT: CPT

## 2018-12-17 PROCEDURE — 99999 PR PBB SHADOW E&M-EST. PATIENT-LVL III: CPT | Mod: PBBFAC,,, | Performed by: OBSTETRICS & GYNECOLOGY

## 2018-12-17 PROCEDURE — 99395 PREV VISIT EST AGE 18-39: CPT | Mod: S$GLB,,, | Performed by: OBSTETRICS & GYNECOLOGY

## 2018-12-17 PROCEDURE — 88175 CYTOPATH C/V AUTO FLUID REDO: CPT | Performed by: PATHOLOGY

## 2018-12-17 PROCEDURE — 88141 CYTOPATH C/V INTERPRET: CPT | Mod: ,,, | Performed by: PATHOLOGY

## 2018-12-17 NOTE — PROGRESS NOTES
CC: Well woman exam    Arianna Duval is a 38 y.o. female  presents for well woman exam.  LMP: Patient's last menstrual period was 2018 (approximate)..  No issues, problems, or complaints.  Recently .  Last pap 2016 normal.  No prior hpv test.  Has FH breast cancer.    Past Medical History:   Diagnosis Date    Abnormal Pap smear     Abnormal Pap smear of cervix     Abnormal Pap smear of vagina     Acute blood loss anemia 2013    Defibrillator discharge     Long Q-T syndrome      Past Surgical History:   Procedure Laterality Date    breast reduction      CARDIAC PACEMAKER PLACEMENT      COLPOSCOPY      REPLACEMENT OF IMPLANTABLE CARDIOVERTER-DEFIBRILLATOR (ICD) GENERATOR N/A 2018    Procedure: REPLACEMENT, PULSE GENERATOR, ICD;  Surgeon: Flex Bryson MD;  Location: [facility] CATH LAB;  Service: Cardiology;  Laterality: N/A;  BRANDO, Dual ICD gen change, MDT, MAC, SK, 3 Prep    REPLACEMENT, PULSE GENERATOR, ICD N/A 2018    Performed by Flex Bryson MD at [facility] CATH LAB     Social History     Socioeconomic History    Marital status:      Spouse name: Not on file    Number of children: Not on file    Years of education: Not on file    Highest education level: Not on file   Social Needs    Financial resource strain: Not on file    Food insecurity - worry: Not on file    Food insecurity - inability: Not on file    Transportation needs - medical: Not on file    Transportation needs - non-medical: Not on file   Occupational History    Occupation: homemaker   Tobacco Use    Smoking status: Never Smoker    Smokeless tobacco: Never Used   Substance and Sexual Activity    Alcohol use: Yes     Comment: socially    Drug use: No    Sexual activity: Yes     Partners: Male     Birth control/protection: None, Partner-Vasectomy   Other Topics Concern    Are you pregnant or think you may be? No    Breast-feeding No   Social History Narrative    Not on file     Family  "History   Problem Relation Age of Onset    Breast cancer Maternal Grandmother     Breast cancer Maternal Aunt     Long QT syndrome Mother     Long QT syndrome Brother     Psoriasis Neg Hx     Lupus Neg Hx     Eczema Neg Hx     Acne Neg Hx     Melanoma Neg Hx     Ovarian cancer Neg Hx     Colon cancer Neg Hx      OB History      Para Term  AB Living    2 2 2     2    SAB TAB Ectopic Multiple Live Births            2          /78   Ht 5' 2" (1.575 m)   Wt 54.6 kg (120 lb 5.9 oz)   LMP 2018 (Approximate)   BMI 22.02 kg/m²       ROS:  GENERAL: Denies weight gain or weight loss. Feeling well overall.   SKIN: Denies rash or lesions.   HEAD: Denies head injury or headache.   NODES: Denies enlarged lymph nodes.   CHEST: Denies chest pain or shortness of breath.   CARDIOVASCULAR: Denies palpitations or left sided chest pain.   ABDOMEN: No abdominal pain, constipation, diarrhea, nausea, vomiting or rectal bleeding.   URINARY: No frequency, dysuria, hematuria, or burning on urination.  REPRODUCTIVE: See HPI.   BREASTS: The patient performs breast self-examination and denies pain, lumps, or nipple discharge.   HEMATOLOGIC: No easy bruisability or excessive bleeding.   MUSCULOSKELETAL: Denies joint pain or swelling.   NEUROLOGIC: Denies syncope or weakness.   PSYCHIATRIC: Denies depression, anxiety or mood swings.    PHYSICAL EXAM:  APPEARANCE: Well nourished, well developed, in no acute distress.  AFFECT: WNL, alert and oriented x 3  SKIN: No acne or hirsutism  NECK: Neck symmetric without masses or thyromegaly  NODES: No inguinal, cervical, axillary, or femoral lymph node enlargement  CHEST: Good respiratory effect  ABDOMEN: Soft.  No tenderness or masses.  No hepatosplenomegaly.  No hernias.  BREASTS: Symmetrical, no skin changes or visible lesions.  No palpable masses, nipple discharge bilaterally.  PELVIC: Normal external genitalia without lesions.  Normal hair distribution.  " Adequate perineal body, normal urethral meatus.  Vagina moist and well rugated without lesions or discharge.  Cervix pink, without lesions, discharge or tenderness.  No significant cystocele or rectocele.  Bimanual exam shows uterus to be normal size, regular, mobile and nontender.  Adnexa without masses or tenderness.    EXTREMITIES: No edema.    ASSESSMENT    ICD-10-CM ICD-9-CM    1. Well woman exam Z01.419 V72.31 HPV High Risk Genotypes, PCR      Liquid-based pap smear, screening      Mammo Digital Screening Bilat w/ Eh   2. Family history of breast cancer Z80.3 V16.3 Mammo Digital Screening Bilat w/ Eh         PLAN:  Well woman exam  -     HPV High Risk Genotypes, PCR  -     Liquid-based pap smear, screening  -     Mammo Digital Screening Bilat w/ Eh; Future; Expected date: 12/17/2018    Family history of breast cancer  -     Mammo Digital Screening Bilat w/ Eh; Future; Expected date: 12/17/2018        Patient was counseled today on A.C.S. Pap guidelines and recommendations for yearly pelvic exams, mammograms and monthly self breast exams; to see her PCP for other health maintenance.

## 2018-12-21 LAB
HPV HR 12 DNA CVX QL NAA+PROBE: NEGATIVE
HPV16 AG SPEC QL: NEGATIVE
HPV18 DNA SPEC QL NAA+PROBE: NEGATIVE

## 2019-01-16 ENCOUNTER — HOSPITAL ENCOUNTER (OUTPATIENT)
Dept: CARDIOLOGY | Facility: CLINIC | Age: 39
Discharge: HOME | End: 2019-01-16
Payer: COMMERCIAL

## 2019-01-16 ENCOUNTER — CLINICAL SUPPORT (OUTPATIENT)
Dept: CARDIOLOGY | Facility: HOSPITAL | Age: 39
End: 2019-01-16
Payer: COMMERCIAL

## 2019-01-16 ENCOUNTER — OFFICE VISIT (OUTPATIENT)
Dept: ELECTROPHYSIOLOGY | Facility: CLINIC | Age: 39
End: 2019-01-16
Payer: COMMERCIAL

## 2019-01-16 VITALS
BODY MASS INDEX: 22.11 KG/M2 | SYSTOLIC BLOOD PRESSURE: 114 MMHG | DIASTOLIC BLOOD PRESSURE: 80 MMHG | HEIGHT: 62 IN | HEART RATE: 64 BPM | WEIGHT: 120.13 LBS

## 2019-01-16 DIAGNOSIS — Z95.810 AUTOMATIC IMPLANTABLE CARDIOVERTER-DEFIBRILLATOR IN SITU: ICD-10-CM

## 2019-01-16 DIAGNOSIS — I49.01 VENTRICULAR FIBRILLATION: ICD-10-CM

## 2019-01-16 DIAGNOSIS — I45.81 LONG Q-T SYNDROME: Primary | ICD-10-CM

## 2019-01-16 PROCEDURE — 93283 PRGRMG EVAL IMPLANTABLE DFB: CPT

## 2019-01-16 PROCEDURE — 3008F BODY MASS INDEX DOCD: CPT | Mod: CPTII,S$GLB,, | Performed by: NURSE PRACTITIONER

## 2019-01-16 PROCEDURE — 99999 PR PBB SHADOW E&M-EST. PATIENT-LVL III: CPT | Mod: PBBFAC,,, | Performed by: NURSE PRACTITIONER

## 2019-01-16 PROCEDURE — 93283 CARDIAC DEVICE CHECK - IN CLINIC: ICD-10-PCS | Mod: 26,,, | Performed by: INTERNAL MEDICINE

## 2019-01-16 PROCEDURE — 93005 RHYTHM STRIP: ICD-10-PCS | Mod: S$GLB,,, | Performed by: INTERNAL MEDICINE

## 2019-01-16 PROCEDURE — 99214 PR OFFICE/OUTPT VISIT, EST, LEVL IV, 30-39 MIN: ICD-10-PCS | Mod: S$GLB,,, | Performed by: NURSE PRACTITIONER

## 2019-01-16 PROCEDURE — 93000 RHYTHM STRIP: ICD-10-PCS | Mod: S$GLB,,, | Performed by: INTERNAL MEDICINE

## 2019-01-16 PROCEDURE — 99999 PR PBB SHADOW E&M-EST. PATIENT-LVL III: ICD-10-PCS | Mod: PBBFAC,,, | Performed by: NURSE PRACTITIONER

## 2019-01-16 PROCEDURE — 93283 PRGRMG EVAL IMPLANTABLE DFB: CPT | Mod: 26,,, | Performed by: INTERNAL MEDICINE

## 2019-01-16 PROCEDURE — 99214 OFFICE O/P EST MOD 30 MIN: CPT | Mod: S$GLB,,, | Performed by: NURSE PRACTITIONER

## 2019-01-16 PROCEDURE — 3008F PR BODY MASS INDEX (BMI) DOCUMENTED: ICD-10-PCS | Mod: CPTII,S$GLB,, | Performed by: NURSE PRACTITIONER

## 2019-01-16 PROCEDURE — 93000 ELECTROCARDIOGRAM COMPLETE: CPT | Mod: S$GLB,,, | Performed by: INTERNAL MEDICINE

## 2019-01-16 PROCEDURE — 93005 ELECTROCARDIOGRAM TRACING: CPT | Mod: S$GLB,,, | Performed by: INTERNAL MEDICINE

## 2019-01-16 NOTE — PROGRESS NOTES
Ms. Duval is a patient of Dr. Bryson and was last seen in clinic 8/1/2018.      Subjective:   Patient ID:  Arianna Duval is a 38 y.o. female who presents for follow-up of Pacemaker Check (ICD)  .     HPI:    Ms. Duval is a 38 y.o. 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    Background:    SCD episode occurred 6 wks post-partum, triggered with startle + stress. Dual chamber ICD implanted 6/10/11 (Dr. Hyman), placed on Toprol. Has not had events to date.   Device interrogation reveals stable function of leads, RA pacing 47%, no RV pacing.  At her last clinic appt 6/2017 she was feeling great. Was considering breast lift, has questions regarding timing of this compared to generator change.  Also noted device is dropping a bit.  Dr. Bryson advised to proceed with breast lift before gen change. Rate was to be decreased to 50bpm to preserve battery. Reduced to 55 in clinic, as of device check 2/2018 it is still 55.    Update (01/16/2019):    On 9/20/2018 she underwent successful generator change and pocket revision.    Today she has no cardiac complaints. Ms. Duval denies chest pain with exertion or at rest, palpitations, SOB, CORTEZ, dizziness, or syncope. She has a rash on her right arm that appears to be resolving.     Device Interrogation (1/16/2019) reveals an intrinsic SR with stable lead and device function. No arrhythmias or treated episodes were noted.  She paces 1% in the RA and 0% in the RV. Estimated battery longevity 10 years.     I have personally reviewed the patient's EKG today, which shows SR at 64bpm. RI interval is 120. QTc is 517. No significant changes.      Current Outpatient Medications   Medication Sig    metoprolol succinate (TOPROL-XL) 25 MG 24 hr tablet Take 1 tablet (25 mg total) by mouth once daily.    potassium chloride SA (K-DUR,KLOR-CON) 20  "MEQ tablet Take 1 tablet (20 mEq total) by mouth once daily.     No current facility-administered medications for this visit.      Facility-Administered Medications Ordered in Other Visits   Medication    0.9%  NaCl infusion       Review of Systems   Constitution: Negative for malaise/fatigue.   Cardiovascular: Negative for chest pain, dyspnea on exertion, irregular heartbeat, leg swelling and palpitations.   Respiratory: Negative for shortness of breath.    Hematologic/Lymphatic: Negative for bleeding problem.   Skin: Negative for rash.   Musculoskeletal: Negative for myalgias.   Gastrointestinal: Negative for hematemesis, hematochezia and nausea.   Genitourinary: Negative for hematuria.   Neurological: Negative for light-headedness.   Psychiatric/Behavioral: Negative for altered mental status.   Allergic/Immunologic: Negative for persistent infections.         Objective:          /80   Pulse 64   Ht 5' 2" (1.575 m)   Wt 54.5 kg (120 lb 2.4 oz)   BMI 21.98 kg/m²     Physical Exam   Constitutional: She is oriented to person, place, and time. She appears well-developed and well-nourished.   HENT:   Head: Normocephalic.   Nose: Nose normal.   Eyes: Pupils are equal, round, and reactive to light.   Cardiovascular: Normal rate, regular rhythm, S1 normal and S2 normal.   No murmur heard.  Pulses:       Radial pulses are 2+ on the right side, and 2+ on the left side.   Pulmonary/Chest: Breath sounds normal. No respiratory distress.   Device to LUCW.   Abdominal: Normal appearance.   Musculoskeletal: Normal range of motion. She exhibits no edema.   Neurological: She is alert and oriented to person, place, and time.   Skin: Skin is warm and dry. No erythema.   Psychiatric: She has a normal mood and affect. Her speech is normal and behavior is normal.   Nursing note and vitals reviewed.    Lab Results   Component Value Date     09/17/2018    K 4.2 09/17/2018    MG 2.1 11/27/2016    BUN 14 09/17/2018    " CREATININE 0.9 09/17/2018    ALT 20 11/27/2016    AST 25 11/27/2016    HGB 13.9 09/17/2018    HCT 40.8 09/17/2018    TSH 1.325 10/08/2015    LDLCALC 147.0 10/08/2015       Recent Labs   Lab 09/17/18  1528   INR 0.9       Assessment:     1. Long Q-T syndrome    2. Ventricular fibrillation    3. Automatic implantable cardioverter-defibrillator in situ      Plan:     In summary, Ms. Duval is a 38 y.o. 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  Ms. Duval is doing well from a device perspective with stable lead and device function. No arrhythmia noted. She is now only pacing 1% in the RA. No ventricular pacing. She feels well.    Continue current medication regimen and device settings.   Follow up in device clinic as scheduled.   Follow up in EP clinic in 1 year, sooner as needed.     *A copy of this note has been sent to Dr. Bryson*    Follow-up in about 1 year (around 1/16/2020).    ------------------------------------------------------------------    RO Bonds, NP-C  [facility]

## 2019-01-25 ENCOUNTER — HOSPITAL ENCOUNTER (EMERGENCY)
Facility: HOSPITAL | Age: 39
Discharge: LEFT BEFORE DISCHARGE | End: 2019-01-25
Attending: EMERGENCY MEDICINE
Payer: COMMERCIAL

## 2019-01-25 VITALS
HEART RATE: 68 BPM | OXYGEN SATURATION: 98 % | WEIGHT: 115 LBS | TEMPERATURE: 98 F | HEIGHT: 62 IN | RESPIRATION RATE: 22 BRPM | BODY MASS INDEX: 21.16 KG/M2

## 2019-01-25 DIAGNOSIS — R10.30 LOWER ABDOMINAL PAIN: Primary | ICD-10-CM

## 2019-01-25 LAB
ALBUMIN SERPL BCP-MCNC: 4 G/DL
ALP SERPL-CCNC: 69 U/L
ALT SERPL W/O P-5'-P-CCNC: 11 U/L
ANION GAP SERPL CALC-SCNC: 7 MMOL/L
AST SERPL-CCNC: 16 U/L
B-HCG UR QL: NEGATIVE
BACTERIA #/AREA URNS AUTO: ABNORMAL /HPF
BASOPHILS # BLD AUTO: 0.04 K/UL
BASOPHILS NFR BLD: 0.4 %
BILIRUB SERPL-MCNC: 1.1 MG/DL
BILIRUB UR QL STRIP: NEGATIVE
BUN SERPL-MCNC: 11 MG/DL
CALCIUM SERPL-MCNC: 9.3 MG/DL
CAOX CRY UR QL COMP ASSIST: ABNORMAL
CHLORIDE SERPL-SCNC: 102 MMOL/L
CLARITY UR REFRACT.AUTO: ABNORMAL
CO2 SERPL-SCNC: 26 MMOL/L
COLOR UR AUTO: YELLOW
CREAT SERPL-MCNC: 0.8 MG/DL
CTP QC/QA: YES
DIFFERENTIAL METHOD: ABNORMAL
EOSINOPHIL # BLD AUTO: 0.1 K/UL
EOSINOPHIL NFR BLD: 1.3 %
ERYTHROCYTE [DISTWIDTH] IN BLOOD BY AUTOMATED COUNT: 11.6 %
EST. GFR  (AFRICAN AMERICAN): >60 ML/MIN/1.73 M^2
EST. GFR  (NON AFRICAN AMERICAN): >60 ML/MIN/1.73 M^2
GLUCOSE SERPL-MCNC: 191 MG/DL
GLUCOSE UR QL STRIP: NEGATIVE
HCT VFR BLD AUTO: 37.6 %
HGB BLD-MCNC: 12.8 G/DL
HGB UR QL STRIP: NEGATIVE
IMM GRANULOCYTES # BLD AUTO: 0.03 K/UL
IMM GRANULOCYTES NFR BLD AUTO: 0.3 %
KETONES UR QL STRIP: ABNORMAL
LEUKOCYTE ESTERASE UR QL STRIP: ABNORMAL
LIPASE SERPL-CCNC: 40 U/L
LYMPHOCYTES # BLD AUTO: 0.7 K/UL
LYMPHOCYTES NFR BLD: 6.5 %
MCH RBC QN AUTO: 34.2 PG
MCHC RBC AUTO-ENTMCNC: 34 G/DL
MCV RBC AUTO: 101 FL
MICROSCOPIC COMMENT: ABNORMAL
MONOCYTES # BLD AUTO: 0.7 K/UL
MONOCYTES NFR BLD: 6.6 %
NEUTROPHILS # BLD AUTO: 9.2 K/UL
NEUTROPHILS NFR BLD: 84.9 %
NITRITE UR QL STRIP: NEGATIVE
NRBC BLD-RTO: 0 /100 WBC
PH UR STRIP: 5 [PH] (ref 5–8)
PLATELET # BLD AUTO: 168 K/UL
PMV BLD AUTO: 10.8 FL
POTASSIUM SERPL-SCNC: 3.2 MMOL/L
PROT SERPL-MCNC: 7.2 G/DL
PROT UR QL STRIP: NEGATIVE
RBC # BLD AUTO: 3.74 M/UL
RBC #/AREA URNS AUTO: 1 /HPF (ref 0–4)
SODIUM SERPL-SCNC: 135 MMOL/L
SP GR UR STRIP: >=1.03 (ref 1–1.03)
SQUAMOUS #/AREA URNS AUTO: 10 /HPF
URN SPEC COLLECT METH UR: ABNORMAL
WBC # BLD AUTO: 10.82 K/UL
WBC #/AREA URNS AUTO: 9 /HPF (ref 0–5)

## 2019-01-25 PROCEDURE — 81001 URINALYSIS AUTO W/SCOPE: CPT

## 2019-01-25 PROCEDURE — 80053 COMPREHEN METABOLIC PANEL: CPT

## 2019-01-25 PROCEDURE — 85025 COMPLETE CBC W/AUTO DIFF WBC: CPT

## 2019-01-25 PROCEDURE — 83690 ASSAY OF LIPASE: CPT

## 2019-01-25 PROCEDURE — 99284 EMERGENCY DEPT VISIT MOD MDM: CPT | Mod: ,,, | Performed by: EMERGENCY MEDICINE

## 2019-01-25 PROCEDURE — 99284 EMERGENCY DEPT VISIT MOD MDM: CPT

## 2019-01-25 PROCEDURE — 99284 PR EMERGENCY DEPT VISIT,LEVEL IV: ICD-10-PCS | Mod: ,,, | Performed by: EMERGENCY MEDICINE

## 2019-01-25 PROCEDURE — 81025 URINE PREGNANCY TEST: CPT | Performed by: EMERGENCY MEDICINE

## 2019-01-25 RX ORDER — KETOROLAC TROMETHAMINE 30 MG/ML
15 INJECTION, SOLUTION INTRAMUSCULAR; INTRAVENOUS
Status: DISCONTINUED | OUTPATIENT
Start: 2019-01-25 | End: 2019-01-26 | Stop reason: HOSPADM

## 2019-01-26 NOTE — ED NOTES
Pt requested to have IV taken out. States that she has to get home to children. Pt states that MD can call with results. Informed pt MD is coming back to speak to her. Pt states that she does not have to time to wait for paperwork and that she can be called.

## 2019-01-26 NOTE — DISCHARGE INSTRUCTIONS
You are leaving the ED prior to obtaining a diagnosis. You may return at any point to continue your evaluation.

## 2019-01-26 NOTE — ED PROVIDER NOTES
Encounter Date: 1/25/2019    SCRIBE #1 NOTE: I, Annemarie Lynch, am scribing for, and in the presence of,  Dr. Ramirez. I have scribed the entire note.       History     Chief Complaint   Patient presents with    Abdominal Pain     Pt reports severe abdominal pain since this afternoon that has worsened. Pt states she vomited once yesterday and once this afternoon. Pt bent over in pain in triage.     Time seen by provider: 10:11 PM    This is a 38 y.o. female with medical conditions including long QT syndrome s/p AICD presents with chief complaint of abdominal pain. Patient endorses intermittent severe pain in LLQ/left flank radiating to her generalized abdomen. Pain is associated with nausea and vomiting. Symptoms were progressive yesterday, today intermittent. Patient seen at urgent care and referred to the ED given concern for appendicitis. She registered earlier today but left due to time wait. Patient came back given return of severe pain. Patient reports symptoms improved to mild while waiting in the waiting room. Patient denies vaginal discharge, dysuria, urinary frequency. No history of ovarian cysts. No history of abdominal abdominal surgeries. Pt is passing flatus. No blood in vomit.          The history is provided by the patient.     Review of patient's allergies indicates:   Allergen Reactions    Sulfa (sulfonamide antibiotics)      Past Medical History:   Diagnosis Date    Abnormal Pap smear     Abnormal Pap smear of cervix     Abnormal Pap smear of vagina     Acute blood loss anemia 8/31/2013    Defibrillator discharge     Long Q-T syndrome      Past Surgical History:   Procedure Laterality Date    breast reduction      CARDIAC PACEMAKER PLACEMENT      COLPOSCOPY      REPLACEMENT, PULSE GENERATOR, ICD N/A 9/20/2018    Performed by Flex [facility]son MD at [facility] CATH LAB     Family History   Problem Relation Age of Onset    Breast cancer Maternal Grandmother     Breast cancer Maternal Aunt      Long QT syndrome Mother     Long QT syndrome Brother     Psoriasis Neg Hx     Lupus Neg Hx     Eczema Neg Hx     Acne Neg Hx     Melanoma Neg Hx     Ovarian cancer Neg Hx     Colon cancer Neg Hx      Social History     Tobacco Use    Smoking status: Never Smoker    Smokeless tobacco: Never Used   Substance Use Topics    Alcohol use: Yes     Comment: socially    Drug use: No     Review of Systems   Constitutional: Negative for chills and fever.   HENT: Negative for facial swelling and rhinorrhea.    Eyes: Negative for visual disturbance.   Respiratory: Negative for cough and shortness of breath.    Cardiovascular: Negative for chest pain.   Gastrointestinal: Positive for abdominal pain, nausea and vomiting.   Genitourinary: Negative for dysuria and flank pain.   Musculoskeletal: Negative for back pain and gait problem.   Neurological: Negative for weakness and numbness.   Psychiatric/Behavioral: Negative for behavioral problems and confusion.       Physical Exam     Initial Vitals [01/25/19 2120]   BP Pulse Resp Temp SpO2   -- 68 (!) 22 98 °F (36.7 °C) 98 %      MAP       --         Physical Exam    Nursing note and vitals reviewed.  Constitutional: She appears well-developed and well-nourished. She is not diaphoretic. No distress.   HENT:   Head: Normocephalic and atraumatic.   Eyes: Conjunctivae and EOM are normal.   Neck: Normal range of motion. Neck supple. No JVD present.   Cardiovascular: Normal rate, regular rhythm, normal heart sounds and intact distal pulses. Exam reveals no gallop and no friction rub.    No murmur heard.  Pulmonary/Chest: Breath sounds normal. No respiratory distress. She has no wheezes. She has no rhonchi. She has no rales. She exhibits no tenderness.   Abdominal: Soft. Bowel sounds are normal. She exhibits no mass. There is no rebound and no guarding.   Tenderness to palpation in bilateral lower quadrants   Musculoskeletal: Normal range of motion. She exhibits no  tenderness.   Lymphadenopathy:     She has no cervical adenopathy.   Neurological: She is alert and oriented to person, place, and time. She has normal strength. No sensory deficit.   Skin: Skin is warm and dry.   Psychiatric: She has a normal mood and affect.         ED Course   Procedures  Labs Reviewed   URINALYSIS, REFLEX TO URINE CULTURE - Abnormal; Notable for the following components:       Result Value    Appearance, UA Hazy (*)     Specific Gravity, UA >=1.030 (*)     Ketones, UA Trace (*)     Leukocytes, UA Trace (*)     All other components within normal limits    Narrative:     Preferred Collection Type->Urine, Clean Catch  Received a cup of urine.   CBC W/ AUTO DIFFERENTIAL - Abnormal; Notable for the following components:    RBC 3.74 (*)      (*)     MCH 34.2 (*)     Gran # (ANC) 9.2 (*)     Lymph # 0.7 (*)     Gran% 84.9 (*)     Lymph% 6.5 (*)     All other components within normal limits   COMPREHENSIVE METABOLIC PANEL - Abnormal; Notable for the following components:    Sodium 135 (*)     Potassium 3.2 (*)     Glucose 191 (*)     Total Bilirubin 1.1 (*)     Anion Gap 7 (*)     All other components within normal limits   URINALYSIS MICROSCOPIC - Abnormal; Notable for the following components:    WBC, UA 9 (*)     All other components within normal limits    Narrative:     Preferred Collection Type->Urine, Clean Catch  Received a cup of urine.   LIPASE   POCT URINE PREGNANCY   POCT CREATININE          Imaging Results          CT Abdomen Pelvis With Contrast (No Result on File)                  Medical Decision Making:   History:   Old Medical Records: I decided to obtain old medical records.  Initial Assessment:   38 y.o. female with medical conditions including long QT syndrome s/p AICD presents with chief complaint of abdominal pain.   Differential Diagnosis:   My differential diagnosis includes, but is not limited to: TOA, ovarian torsion, appendicitis, gastritis, nephrolithiasis.  Clinical  Tests:   Lab Tests: Ordered and Reviewed  Radiological Study: Ordered  ED Management:  Will administer IV fluids and Toradol. Will obtain labs, CT A/P and pelvic US.     Reassessment:  Pregnancy test negative. UA with trace ketones but no evidence of infection.  Prior to obtaining blood test results, the patient wished to leave AMA.  I advised the risks of leaving prematurely.  Patient reports she has to take care of her kids.  Patient has capacity to make medical decisions.  She was advised she may return to the ED at any point to continue her evaluation.    Reassessment:  CBC without leukocytosis.  CMP with hypokalemia of 3.2 and hyperglycemia of 191.  Lipase normal.            Scribe Attestation:   Scribe #1: I performed the above scribed service and the documentation accurately describes the services I performed. I attest to the accuracy of the note.    Attending Attestation:           Physician Attestation for Scribe:      Comments: I, Dr. Tre Ramirez, personally performed the services described in this documentation. All medical record entries made by the scribe were at my direction and in my presence.  I have reviewed the chart and agree that the record reflects my personal performance and is accurate and complete. Tre Ramirez MD.  12:20 AM 01/26/2019                 Clinical Impression:   The encounter diagnosis was Lower abdominal pain.      Disposition:   AMA: Patient left AMA after: lab drawn and lab resulted. Patient refused: CT and US. Patient status: oriented x 3 and not intoxicated. Alternative treatments were explained to the patient. The family was present when AMA discussions took place. AMA Form: patient refused to sign                         Tre Ramirez MD  01/26/19 0020

## 2019-01-26 NOTE — ED NOTES
Pt reports that she has been lower middle quadrant abdominal pain. Pt reports that pain radiates enrrique her LLQ of her abdomen. Pt describes her pain as stabbing. Pt reports n/v x2. Pt reports decreases BM today. Pt denies bloody stool.

## 2019-03-18 ENCOUNTER — TELEPHONE (OUTPATIENT)
Dept: DERMATOLOGY | Facility: CLINIC | Age: 39
End: 2019-03-18

## 2019-03-18 NOTE — TELEPHONE ENCOUNTER
----- Message from Val Toussaint sent at 3/18/2019 10:03 AM CDT -----  Contact: PT    Reason for call: Patient needs to schedule an appointment         Communication Preference: [phone]    Additional Information:

## 2019-04-04 RX ORDER — POTASSIUM CHLORIDE 20 MEQ/1
20 TABLET, EXTENDED RELEASE ORAL DAILY
Qty: 90 TABLET | Refills: 3 | Status: SHIPPED | OUTPATIENT
Start: 2019-04-04 | End: 2019-10-24 | Stop reason: SDUPTHER

## 2019-04-04 RX ORDER — METOPROLOL SUCCINATE 25 MG/1
25 TABLET, EXTENDED RELEASE ORAL DAILY
Qty: 90 TABLET | Refills: 3 | Status: SHIPPED | OUTPATIENT
Start: 2019-04-04 | End: 2019-10-24 | Stop reason: SDUPTHER

## 2019-04-05 ENCOUNTER — OFFICE VISIT (OUTPATIENT)
Dept: DERMATOLOGY | Facility: CLINIC | Age: 39
End: 2019-04-05
Payer: COMMERCIAL

## 2019-04-05 DIAGNOSIS — L30.8 OTHER ECZEMA: ICD-10-CM

## 2019-04-05 DIAGNOSIS — L50.9 URTICARIA: Primary | ICD-10-CM

## 2019-04-05 PROCEDURE — 99213 PR OFFICE/OUTPT VISIT, EST, LEVL III, 20-29 MIN: ICD-10-PCS | Mod: S$GLB,,, | Performed by: DERMATOLOGY

## 2019-04-05 PROCEDURE — 99999 PR PBB SHADOW E&M-EST. PATIENT-LVL III: CPT | Mod: PBBFAC,,, | Performed by: DERMATOLOGY

## 2019-04-05 PROCEDURE — 99999 PR PBB SHADOW E&M-EST. PATIENT-LVL III: ICD-10-PCS | Mod: PBBFAC,,, | Performed by: DERMATOLOGY

## 2019-04-05 PROCEDURE — 99213 OFFICE O/P EST LOW 20 MIN: CPT | Mod: S$GLB,,, | Performed by: DERMATOLOGY

## 2019-04-05 RX ORDER — TRIAMCINOLONE ACETONIDE 1 MG/G
CREAM TOPICAL
Qty: 454 G | Refills: 0 | Status: SHIPPED | OUTPATIENT
Start: 2019-04-05 | End: 2021-03-15 | Stop reason: ALTCHOICE

## 2019-04-05 RX ORDER — LEVOCETIRIZINE DIHYDROCHLORIDE 5 MG/1
TABLET, FILM COATED ORAL
Qty: 30 TABLET | Refills: 2 | Status: ON HOLD | OUTPATIENT
Start: 2019-04-05 | End: 2020-03-19 | Stop reason: HOSPADM

## 2019-04-05 NOTE — PROGRESS NOTES
"  Subjective:       Patient ID:  Arianna Duval is a 38 y.o. female who presents for     Itching  - Initial  Affected locations: left upper leg, right upper leg, right lower leg, left lower leg, left arm, right arm, back and abdomen  Duration: 3 weeks  Signs / symptoms: itching and redness  Severity: mild to moderate  Timing: recurrent  Aggravated by: sweating  Relieving factors/Treatments tried: nothing  Improvement on treatment: no relief      Pt was concerned she may have been getting flea bites, so had  come to her house.  Wakes up with itchy "bites" in the morning.  Rash comes and goes within 24 hrs.  Has taken benadryl the past couple nights to help with itching.  No one else in house getting bites.    Of note, pt has Long Q-T syndrome and has a defibrillator and a list of medications to avoid.    Review of Systems   Constitutional: Negative for fever, chills, weight loss, weight gain, fatigue, night sweats and malaise.   Skin: Positive for itching, daily sunscreen use and activity-related sunscreen use.        Objective:    Physical Exam   Constitutional: She appears well-developed and well-nourished.   Neurological: She is alert and oriented to person, place, and time.   Psychiatric: She has a normal mood and affect.   Skin:   Areas Examined (abnormalities noted in diagram):   Head / Face Inspection Performed  Neck Inspection Performed  Chest / Axilla Inspection Performed  Abdomen Inspection Performed  Back Inspection Performed  RUE Inspected  LUE Inspection Performed  RLE Inspected  LLE Inspection Performed              Diagram Legend     Erythematous scaling macule/papule c/w actinic keratosis       Vascular papule c/w angioma      Pigmented verrucoid papule/plaque c/w seborrheic keratosis      Yellow umbilicated papule c/w sebaceous hyperplasia      Irregularly shaped tan macule c/w lentigo     1-2 mm smooth white papules consistent with Milia      Movable subcutaneous cyst with punctum " "c/w epidermal inclusion cyst      Subcutaneous movable cyst c/w pilar cyst      Firm pink to brown papule c/w dermatofibroma      Pedunculated fleshy papule(s) c/w skin tag(s)      Evenly pigmented macule c/w junctional nevus     Mildly variegated pigmented, slightly irregular-bordered macule c/w mildly atypical nevus      Flesh colored to evenly pigmented papule c/w intradermal nevus       Pink pearly papule/plaque c/w basal cell carcinoma      Erythematous hyperkeratotic cursted plaque c/w SCC      Surgical scar with no sign of skin cancer recurrence      Open and closed comedones      Inflammatory papules and pustules      Verrucoid papule consistent consistent with wart     Erythematous eczematous patches and plaques     Dystrophic onycholytic nail with subungual debris c/w onychomycosis     Umbilicated papule    Erythematous-base heme-crusted tan verrucoid plaque consistent with inflamed seborrheic keratosis     Erythematous Silvery Scaling Plaque c/w Psoriasis     See annotation      Assessment / Plan:        Urticaria  Unsure of trigger.  Recommended taking zyrtec in am and xyzal at bedtime (benadryl should be avoided with long Q-T). If rash goes away, try eliminating 1 antihistamine at a time. If rash recurs, recommended allergy work up.  -     levocetirizine (XYZAL) 5 MG tablet; Take 1/2 to 1 tab po qhs prn hives  Dispense: 30 tablet; Refill: 2  -     Ambulatory referral/consult to Allergy    Other eczema  -     triamcinolone acetonide 0.1% (KENALOG) 0.1 % cream; AAA BID x 1-2 wks then prn flares only. Avoid face, armpits, groin  Dispense: 454 g; Refill: 0    Good skin care regimen discussed including limiting to one bath or shower/day, using lukewarm water with mild soap and moisturizing cream to skin 1 - 2x/day. Brochure was provided and reviewed with patient.  Recommended CeraVe moisturizing cream, Dove sensitive skin soap, and "free and clear" detergents.    Follow up if symptoms worsen or fail to " improve.

## 2019-04-05 NOTE — PATIENT INSTRUCTIONS
XEROSIS (DRY SKIN)        1. Definition    Xerosis is the term for dry skin.  We all have a natural oil coating over our skin produced by the skin oil glands.  If this oil is removed, the skin becomes dry which can lead to cracking, which can lead to inflammation.  Xerosis is usually a long-term problem that recurs often, especially in the winter.    2. Cause     Long hot baths or showers can remove our natural oil and lead to xerosis.  One should never take more than one bath or shower a day and for no longer than ten minutes.   Use of harsh soaps such as Zest, Dial, and Ivory can worsen and cause xerosis.   Cold winter weather worsens xerosis because the amount of moisture contained in cold air is much less than the amount of moisture in warm air.    3. Treatment     Treatment is intended to restore the natural oil to your skin.  Keep the skin lubricated.     Do not take more than one bath or shower a day.  Use lukewarm water, not hot.  Hot water dries out the skin.     Use a gentle moisturizing soap such as Cetaphil soap, Oil of Olay, Dove, Basis, Ivory moisture care, Restoraderm cleanser.     When toweling dry, dont rub.  Blot the skin so there is still some water left on the skin.  You should apply a moisturizing cream to all of the skin such as Cerave cream, Cetaphil cream, Restoraderm or Eucerin Original Formula cream.   Alpha hydroxyacid lotions, i.e., AmLactin, also work very well for preventing dry skin, but may burn when used on inflamed or reddened skin.     If you like to swim during the winter months, you should not use soap when getting out of the pool.  When you have finished swimming, rinse off the chlorine with cool to warm water.  If this will be the only shower of the day, then you may use Cetaphil or another mild soap to cleanse your skin.  After the shower, apply a moisturizing cream to all of the skin as above.        [address] [facility] [place] [address]/ [phone]  [phone] FAX/ [email or web address]  Summer Sun Protection      The [facility] would like to remind you of the importance of sun protection all year round and particularly during the summer when the suns rays are the strongest. It has been proven that both acute and chronic sun exposure damages our cells and leads to skin cancer. Beyond skin cancer, the sun causes 90% of the symptoms of pre-mature skin aging, including wrinkles, lentigines (brown spots), and thin, easily bruised skin. Proper sun protection can help prevent these unwanted conditions.    Many patients report that the dont go in the sun. It has been shown that the average person receives 18 hours of incidental sun exposure per week during activities such as walking through parking lots, driving, or sitting next to windows. This accumulates to several bad sunburns per year!    In choosing sunscreen, you want one that protects against both UVA and UVB rays. It is recommended that you use one of SPF 30 or higher. It is important to apply the sunscreen about 20 minutes prior to sun exposure. Most sunscreens are chemical sunscreens and a reaction must take place in the skin so that they are effective. If they are applied and then you are immediately exposed to the sun or start sweating, this reaction has not had time to take place and you are therefore unprotected. Sunscreen needs to be reapplied every 2 hours if you are participating in water sports or sweating. We recommend Elta MD or Neutrogena Ultra Sheer Dry Touch SPF 55 for daily use; however there are many options and it is most important for you to find one that you will use on a consistent basis.    If you have sensitive skin, you may do best with a sunscreen that contains only physical blockers such as titanium dioxide or zinc oxide. These are typically thicker and harder to apply, however they afford very good protection. Neutrogena Sensitive Skin, Blue Lizard Sensitive  Skin (pink top) or Neutrogena Pure and Free are popular ones.     Aside from sunscreen, clothes with UV protection, wide brimmed hats, and sunglasses are other means of sun protection that we recommend.                        [facility] - DERMATOLOGY  [address]  [place] [address]  Dept: [phone]  Dept Fax: [phone]

## 2019-04-09 ENCOUNTER — HOSPITAL ENCOUNTER (OUTPATIENT)
Dept: RADIOLOGY | Facility: OTHER | Age: 39
Discharge: HOME | End: 2019-04-09
Attending: OBSTETRICS & GYNECOLOGY
Payer: COMMERCIAL

## 2019-04-09 DIAGNOSIS — Z80.3 FAMILY HISTORY OF BREAST CANCER: ICD-10-CM

## 2019-04-09 DIAGNOSIS — Z01.419 WELL WOMAN EXAM: ICD-10-CM

## 2019-04-09 PROCEDURE — 77063 BREAST TOMOSYNTHESIS BI: CPT | Mod: 26,,, | Performed by: RADIOLOGY

## 2019-04-09 PROCEDURE — 77067 SCR MAMMO BI INCL CAD: CPT | Mod: TC

## 2019-04-09 PROCEDURE — 77067 SCR MAMMO BI INCL CAD: CPT | Mod: 26,,, | Performed by: RADIOLOGY

## 2019-04-09 PROCEDURE — 77063 MAMMO DIGITAL SCREENING BILAT WITH TOMOSYNTHESIS_CAD: ICD-10-PCS | Mod: 26,,, | Performed by: RADIOLOGY

## 2019-04-09 PROCEDURE — 77067 MAMMO DIGITAL SCREENING BILAT WITH TOMOSYNTHESIS_CAD: ICD-10-PCS | Mod: 26,,, | Performed by: RADIOLOGY

## 2019-04-10 ENCOUNTER — PATIENT MESSAGE (OUTPATIENT)
Dept: OBSTETRICS AND GYNECOLOGY | Facility: CLINIC | Age: 39
End: 2019-04-10

## 2019-04-10 DIAGNOSIS — Z91.89 INCREASED RISK OF BREAST CANCER: Primary | ICD-10-CM

## 2019-04-11 ENCOUNTER — PATIENT MESSAGE (OUTPATIENT)
Dept: INFECTIOUS DISEASES | Facility: CLINIC | Age: 39
End: 2019-04-11

## 2019-04-23 DIAGNOSIS — Z95.810 ICD (IMPLANTABLE CARDIOVERTER-DEFIBRILLATOR) IN PLACE: Primary | ICD-10-CM

## 2019-07-30 ENCOUNTER — TELEPHONE (OUTPATIENT)
Dept: SURGERY | Facility: CLINIC | Age: 39
End: 2019-07-30

## 2019-07-30 NOTE — PROGRESS NOTES
Spoke with pt in regards to r/s 8/1 appointment wi Dr Sherwood, pt had no idea appointment was even scheduled [place] will call back once she makes it back to schedule

## 2019-08-12 ENCOUNTER — TELEPHONE (OUTPATIENT)
Dept: SURGERY | Facility: CLINIC | Age: 39
End: 2019-08-12

## 2019-08-12 NOTE — TELEPHONE ENCOUNTER
Spoke with pt about missingthe high risk appointment, pt stated she wasn't intrested in rescheduling that appointment

## 2019-09-30 ENCOUNTER — TELEPHONE (OUTPATIENT)
Dept: INTERNAL MEDICINE | Facility: CLINIC | Age: 39
End: 2019-09-30

## 2019-09-30 NOTE — TELEPHONE ENCOUNTER
----- Message from Marzena Dior sent at 9/30/2019  9:57 AM CDT -----  Contact: self/ [phone]  Caller is requesting a sooner appointment. Caller declined first available appointment listed below. Caller will not accept being placed on the wait list and is requesting a message be sent to the provider.    When is the next available appointment:  Dec  Did you offer to schedule the next available appt and put the patient on the wait list?:     What visit type: ep/ phys  Symptoms:  Sob, dry mouth, off and on nausea, patient states Dr. Beckett knows of her history, fainting spells sometimes  Patient preference of timeframe to be scheduled:    What is the reason the patient is requesting a sooner appointment? (insurance terminating, changing jobs):    Would the patient rather a call back or a response via [facility]?:    Comments:

## 2019-10-01 ENCOUNTER — TELEPHONE (OUTPATIENT)
Dept: INTERNAL MEDICINE | Facility: CLINIC | Age: 39
End: 2019-10-01

## 2019-10-01 DIAGNOSIS — Z00.00 ROUTINE GENERAL MEDICAL EXAMINATION AT A HEALTH CARE FACILITY: Primary | ICD-10-CM

## 2019-10-01 NOTE — TELEPHONE ENCOUNTER
----- Message from Odessa Nieves MA sent at 10/1/2019  3:07 PM CDT -----  Contact: [phone]      ----- Message -----  From: Mindi Lujan  Sent: 10/1/2019   2:53 PM CDT  To: Sujit RODRIGEZ Staff    Patient is requesting a call from the office. She is having symptoms of  sob, dry mouth, off and on nausea, patient states Dr. Beckett knows of her history, fainting spells sometimes. She is only wanting to see .  Please advise, thanks

## 2019-10-01 NOTE — TELEPHONE ENCOUNTER
Spoke with pt, she says for the last month she has been having symptoms that come and go and is not severe but she wants to be seen soon before they do become worse. She says they symptoms are: SOB, dry mouth, nausea. Pt refuses appt with anyone but PCP.     She says she had these symptoms about 10 years ago and Dr. Beckett is aware of her history and would like to only be seen by you.

## 2019-10-02 NOTE — TELEPHONE ENCOUNTER
She needs to come in for fasting blood work this week  I can see her one day next week in a daily change spot - Tuesday 7/8/19 at 3 pm or  wed 10/9/19 at 12:30

## 2019-10-07 ENCOUNTER — LAB VISIT (OUTPATIENT)
Dept: LAB | Facility: HOSPITAL | Age: 39
End: 2019-10-07
Attending: INTERNAL MEDICINE
Payer: COMMERCIAL

## 2019-10-07 DIAGNOSIS — Z00.00 ROUTINE GENERAL MEDICAL EXAMINATION AT A HEALTH CARE FACILITY: ICD-10-CM

## 2019-10-07 LAB
25(OH)D3+25(OH)D2 SERPL-MCNC: 33 NG/ML (ref 30–96)
ALBUMIN SERPL BCP-MCNC: 4.1 G/DL (ref 3.5–5.2)
ALP SERPL-CCNC: 56 U/L (ref 55–135)
ALT SERPL W/O P-5'-P-CCNC: 9 U/L (ref 10–44)
ANION GAP SERPL CALC-SCNC: 10 MMOL/L (ref 8–16)
AST SERPL-CCNC: 14 U/L (ref 10–40)
BASOPHILS # BLD AUTO: 0.02 K/UL (ref 0–0.2)
BASOPHILS NFR BLD: 0.5 % (ref 0–1.9)
BILIRUB SERPL-MCNC: 0.6 MG/DL (ref 0.1–1)
BUN SERPL-MCNC: 9 MG/DL (ref 6–20)
CALCIUM SERPL-MCNC: 8.9 MG/DL (ref 8.7–10.5)
CHLORIDE SERPL-SCNC: 105 MMOL/L (ref 95–110)
CHOLEST SERPL-MCNC: 209 MG/DL (ref 120–199)
CHOLEST/HDLC SERPL: 2.9 {RATIO} (ref 2–5)
CO2 SERPL-SCNC: 24 MMOL/L (ref 23–29)
CREAT SERPL-MCNC: 0.8 MG/DL (ref 0.5–1.4)
DIFFERENTIAL METHOD: ABNORMAL
EOSINOPHIL # BLD AUTO: 0.2 K/UL (ref 0–0.5)
EOSINOPHIL NFR BLD: 5.1 % (ref 0–8)
ERYTHROCYTE [DISTWIDTH] IN BLOOD BY AUTOMATED COUNT: 12.1 % (ref 11.5–14.5)
EST. GFR  (AFRICAN AMERICAN): >60 ML/MIN/1.73 M^2
EST. GFR  (NON AFRICAN AMERICAN): >60 ML/MIN/1.73 M^2
FERRITIN SERPL-MCNC: 16 NG/ML (ref 20–300)
GLUCOSE SERPL-MCNC: 94 MG/DL (ref 70–110)
HCT VFR BLD AUTO: 26.8 % (ref 37–48.5)
HDLC SERPL-MCNC: 71 MG/DL (ref 40–75)
HDLC SERPL: 34 % (ref 20–50)
HGB BLD-MCNC: 9.3 G/DL (ref 12–16)
LDLC SERPL CALC-MCNC: 124.2 MG/DL (ref 63–159)
LYMPHOCYTES # BLD AUTO: 0.8 K/UL (ref 1–4.8)
LYMPHOCYTES NFR BLD: 18.5 % (ref 18–48)
MAGNESIUM SERPL-MCNC: 2 MG/DL (ref 1.6–2.6)
MCH RBC QN AUTO: 34.3 PG (ref 27–31)
MCHC RBC AUTO-ENTMCNC: 34.7 G/DL (ref 32–36)
MCV RBC AUTO: 99 FL (ref 82–98)
MONOCYTES # BLD AUTO: 0.4 K/UL (ref 0.3–1)
MONOCYTES NFR BLD: 9.5 % (ref 4–15)
NEUTROPHILS # BLD AUTO: 2.7 K/UL (ref 1.8–7.7)
NEUTROPHILS NFR BLD: 66.4 % (ref 38–73)
NONHDLC SERPL-MCNC: 138 MG/DL
PLATELET # BLD AUTO: 217 K/UL (ref 150–350)
PMV BLD AUTO: 10.7 FL (ref 9.2–12.9)
POTASSIUM SERPL-SCNC: 3.8 MMOL/L (ref 3.5–5.1)
PROT SERPL-MCNC: 6.9 G/DL (ref 6–8.4)
RBC # BLD AUTO: 2.71 M/UL (ref 4–5.4)
SODIUM SERPL-SCNC: 139 MMOL/L (ref 136–145)
TRIGL SERPL-MCNC: 69 MG/DL (ref 30–150)
TSH SERPL DL<=0.005 MIU/L-ACNC: 1.09 UIU/ML (ref 0.4–4)
VIT B12 SERPL-MCNC: 858 PG/ML (ref 210–950)
WBC # BLD AUTO: 4.1 K/UL (ref 3.9–12.7)

## 2019-10-07 PROCEDURE — 85025 COMPLETE CBC W/AUTO DIFF WBC: CPT

## 2019-10-07 PROCEDURE — 82306 VITAMIN D 25 HYDROXY: CPT

## 2019-10-07 PROCEDURE — 83735 ASSAY OF MAGNESIUM: CPT

## 2019-10-07 PROCEDURE — 82728 ASSAY OF FERRITIN: CPT

## 2019-10-07 PROCEDURE — 80053 COMPREHEN METABOLIC PANEL: CPT

## 2019-10-07 PROCEDURE — 82607 VITAMIN B-12: CPT

## 2019-10-07 PROCEDURE — 36415 COLL VENOUS BLD VENIPUNCTURE: CPT

## 2019-10-07 PROCEDURE — 84443 ASSAY THYROID STIM HORMONE: CPT

## 2019-10-07 PROCEDURE — 80061 LIPID PANEL: CPT

## 2019-10-08 ENCOUNTER — OFFICE VISIT (OUTPATIENT)
Dept: INTERNAL MEDICINE | Facility: CLINIC | Age: 39
End: 2019-10-08
Payer: COMMERCIAL

## 2019-10-08 VITALS
HEIGHT: 62 IN | OXYGEN SATURATION: 99 % | TEMPERATURE: 98 F | DIASTOLIC BLOOD PRESSURE: 86 MMHG | WEIGHT: 116.38 LBS | HEART RATE: 69 BPM | BODY MASS INDEX: 21.42 KG/M2 | SYSTOLIC BLOOD PRESSURE: 128 MMHG

## 2019-10-08 DIAGNOSIS — D50.8 OTHER IRON DEFICIENCY ANEMIA: ICD-10-CM

## 2019-10-08 DIAGNOSIS — Z00.00 ROUTINE GENERAL MEDICAL EXAMINATION AT A HEALTH CARE FACILITY: Primary | ICD-10-CM

## 2019-10-08 DIAGNOSIS — D50.9 IRON DEFICIENCY ANEMIA, UNSPECIFIED IRON DEFICIENCY ANEMIA TYPE: ICD-10-CM

## 2019-10-08 LAB
BILIRUB UR QL STRIP: NEGATIVE
CLARITY UR REFRACT.AUTO: CLEAR
COLOR UR AUTO: YELLOW
GLUCOSE UR QL STRIP: NEGATIVE
HGB UR QL STRIP: ABNORMAL
KETONES UR QL STRIP: NEGATIVE
LEUKOCYTE ESTERASE UR QL STRIP: NEGATIVE
MICROSCOPIC COMMENT: NORMAL
NITRITE UR QL STRIP: NEGATIVE
PH UR STRIP: 8 [PH] (ref 5–8)
PROT UR QL STRIP: NEGATIVE
RBC #/AREA URNS AUTO: 4 /HPF (ref 0–4)
SP GR UR STRIP: 1.01 (ref 1–1.03)
SQUAMOUS #/AREA URNS AUTO: 5 /HPF
URN SPEC COLLECT METH UR: ABNORMAL

## 2019-10-08 PROCEDURE — 99999 PR PBB SHADOW E&M-EST. PATIENT-LVL III: ICD-10-PCS | Mod: PBBFAC,,, | Performed by: INTERNAL MEDICINE

## 2019-10-08 PROCEDURE — 99395 PREV VISIT EST AGE 18-39: CPT | Mod: S$GLB,,, | Performed by: INTERNAL MEDICINE

## 2019-10-08 PROCEDURE — 81001 URINALYSIS AUTO W/SCOPE: CPT

## 2019-10-08 PROCEDURE — 99999 PR PBB SHADOW E&M-EST. PATIENT-LVL III: CPT | Mod: PBBFAC,,, | Performed by: INTERNAL MEDICINE

## 2019-10-08 PROCEDURE — 99395 PR PREVENTIVE VISIT,EST,18-39: ICD-10-PCS | Mod: S$GLB,,, | Performed by: INTERNAL MEDICINE

## 2019-10-08 RX ORDER — IRON,CARBONYL/ASCORBIC ACID 65MG-125MG
1 TABLET, DELAYED RELEASE (ENTERIC COATED) ORAL DAILY
Status: ON HOLD | COMMUNITY
Start: 2019-10-08 | End: 2020-03-19 | Stop reason: HOSPADM

## 2019-10-08 NOTE — PROGRESS NOTES
"CHIEF COMPLAINT: Annual exam      HISTORY OF PRESENT ILLNESS: This is a 38-year-old woman who presents for her annual exam.     She fainted a month ago. She has been more tired lately. She is craving ice. She has had some intermittent headaches. She has been dizzy.  She has been short of breath going up step.  She has not been able to exercise like she wants.    She was anemic with heme positive stools in .     Periods are once a month.  Periods have not gotten heavier.      She is taking metoprolol succinate 25 mg at bedtime for her congenital prolonged QT syndrome.  She has been exercising regularly. No chest pain, shortness of breath, headaches, palpitations, lightheadedness.      PAST MEDICAL HISTORY:   1. 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 Episode occurred 6 wks post-partum, triggered with startle + stress. Dual chamber ICD implanted 6/10/11 (Dr. Hyman), placed on Toprol. Has not had events to date.   2. History of heme positive stools of unclear etiology. Had some erosions   in the distal small bowel on capsular endoscopy, had colonoscopies and   endoscopies that were unremarkable.   3. Allergic rhinitis.   4.       PAST SURGICAL HISTORY:   1. Breast reduction.   2. [place] teeth removal.      MEDICATIONS and ALLERGIES: Updated on      Social - no tobacco, no alcohol, , 2 children.     Family history - mother and brother with prolonged QT gene     Review of systems - no fever, chills, nightsweats, weight loss, sinus congestion, sore throat, shortness of breath, chest pain, nausea, vomiting, constipation, diarrhea, dysuria, hematuria, joint pain, muscle pain, headache, rashes, anxiety, depression, insomnia.     PHYSICAL EXAMINATION:   /86 (BP Location: Right arm, Patient Position: Sitting, BP Method: Large (Manual))   Pulse 69   Temp 97.8 °F (36.6 °C) (Oral)   Ht 5' 2" (1.575 m)   Wt " 52.8 kg (116 lb 6.5 oz)   LMP 10/07/2019   SpO2 99%   BMI 21.29 kg/m²      General: Alert, oriented. No apparent distress. Affect within normal   limits.   Conjunctivae anicteric. PERRL. Tympanic membranes clear. Oropharynx   clear.   Neck supple. No cervical lymphadenopathy, no thyroid enlargement.   Respiratory effort normal. Lungs clear to auscultation.   Heart: Regular rate and rhythm without murmurs, gallops or rubs.   No lower extremity edema.   ABDOMEN: soft, non distended, non tender, bowel sounds present, no hepatosplenomgaly    Stool heme negative    Labs yesterday reviewed - iron deficiency anemia- Hb 9.3, Ferritin 16- other labs acceptable        ASSESSMENT AND PLAN:   Annual exam  Iron deficiency anemia - vitron C one tablet daliy- see if can tolerate twice daily. Repeat stool for occult blood x 2 and urinalysis  Prolonged QT - s/p ICD placement  Labs in one month  Return in one year, sooner if issues

## 2019-10-15 ENCOUNTER — LAB VISIT (OUTPATIENT)
Dept: LAB | Facility: HOSPITAL | Age: 39
End: 2019-10-15
Attending: INTERNAL MEDICINE
Payer: COMMERCIAL

## 2019-10-15 DIAGNOSIS — D50.9 IRON DEFICIENCY ANEMIA, UNSPECIFIED IRON DEFICIENCY ANEMIA TYPE: ICD-10-CM

## 2019-10-15 PROCEDURE — 82272 OCCULT BLD FECES 1-3 TESTS: CPT

## 2019-10-16 LAB
OB PNL STL: NEGATIVE
OB PNL STL: NEGATIVE

## 2019-10-17 ENCOUNTER — TELEPHONE (OUTPATIENT)
Dept: INTERNAL MEDICINE | Facility: CLINIC | Age: 39
End: 2019-10-17

## 2019-10-17 DIAGNOSIS — D50.9 IRON DEFICIENCY ANEMIA, UNSPECIFIED IRON DEFICIENCY ANEMIA TYPE: Primary | ICD-10-CM

## 2019-10-17 NOTE — TELEPHONE ENCOUNTER
Spoke with pt  Stool for blood negative x 2  Urine had 2+ blood and 4 rbc - having period at the time    Will repeat urine

## 2019-10-24 ENCOUNTER — PATIENT MESSAGE (OUTPATIENT)
Dept: ELECTROPHYSIOLOGY | Facility: CLINIC | Age: 39
End: 2019-10-24

## 2019-10-24 RX ORDER — METOPROLOL SUCCINATE 25 MG/1
25 TABLET, EXTENDED RELEASE ORAL DAILY
Qty: 90 TABLET | Refills: 3 | Status: ON HOLD | OUTPATIENT
Start: 2019-10-24 | End: 2020-03-19 | Stop reason: HOSPADM

## 2019-10-24 RX ORDER — POTASSIUM CHLORIDE 20 MEQ/1
20 TABLET, EXTENDED RELEASE ORAL DAILY
Qty: 90 TABLET | Refills: 3 | Status: ON HOLD | OUTPATIENT
Start: 2019-10-24 | End: 2020-03-19 | Stop reason: HOSPADM

## 2019-12-10 ENCOUNTER — OFFICE VISIT (OUTPATIENT)
Dept: OPTOMETRY | Facility: CLINIC | Age: 39
End: 2019-12-10
Payer: COMMERCIAL

## 2019-12-10 ENCOUNTER — OFFICE VISIT (OUTPATIENT)
Dept: OPTOMETRY | Facility: CLINIC | Age: 39
End: 2019-12-10

## 2019-12-10 DIAGNOSIS — H17.9 CORNEAL SCAR, RIGHT EYE: Primary | ICD-10-CM

## 2019-12-10 DIAGNOSIS — H52.13 MYOPIA WITH ASTIGMATISM, BILATERAL: ICD-10-CM

## 2019-12-10 DIAGNOSIS — Z46.0 FITTING AND ADJUSTMENT OF SPECTACLES AND CONTACT LENSES: ICD-10-CM

## 2019-12-10 DIAGNOSIS — H52.203 MYOPIA WITH ASTIGMATISM, BILATERAL: ICD-10-CM

## 2019-12-10 DIAGNOSIS — H52.203 MYOPIA WITH ASTIGMATISM, BILATERAL: Primary | ICD-10-CM

## 2019-12-10 DIAGNOSIS — H52.13 MYOPIA WITH ASTIGMATISM, BILATERAL: Primary | ICD-10-CM

## 2019-12-10 PROCEDURE — 92310 PR CONTACT LENS FITTING (NO CHANGE): ICD-10-PCS | Mod: CSM,,, | Performed by: OPTOMETRIST

## 2019-12-10 PROCEDURE — 99213 OFFICE O/P EST LOW 20 MIN: CPT | Mod: PBBFAC | Performed by: OPTOMETRIST

## 2019-12-10 PROCEDURE — 99999 PR PBB SHADOW E&M-EST. PATIENT-LVL III: CPT | Mod: PBBFAC,,, | Performed by: OPTOMETRIST

## 2019-12-10 PROCEDURE — 92014 COMPRE OPH EXAM EST PT 1/>: CPT | Mod: S$GLB,,, | Performed by: OPTOMETRIST

## 2019-12-10 PROCEDURE — 92015 PR REFRACTION: ICD-10-PCS | Mod: S$GLB,,, | Performed by: OPTOMETRIST

## 2019-12-10 PROCEDURE — 92015 DETERMINE REFRACTIVE STATE: CPT | Mod: S$GLB,,, | Performed by: OPTOMETRIST

## 2019-12-10 PROCEDURE — 99999 PR PBB SHADOW E&M-EST. PATIENT-LVL III: ICD-10-PCS | Mod: PBBFAC,,, | Performed by: OPTOMETRIST

## 2019-12-10 PROCEDURE — 92310 CONTACT LENS FITTING OU: CPT | Mod: CSM,,, | Performed by: OPTOMETRIST

## 2019-12-10 PROCEDURE — 92014 PR EYE EXAM, EST PATIENT,COMPREHESV: ICD-10-PCS | Mod: S$GLB,,, | Performed by: OPTOMETRIST

## 2019-12-11 NOTE — PROGRESS NOTES
HPI     BRIDGETTE:04/2018  Glasses? Yes   Contacts? Yes   H/o eye surgery, injections or laser: no  H/o eye injury: no  Known eye conditions? no  Family h/o eye conditions? no  Eye gtts?no    (-) Flashes (+) Floaters OU increase recently in past 2 mo or so  (-)   Mucous   (--) Tearing (-) Itching (-) Burning   (-) Headaches (-) Eye Pain/discomfort (-) Irritation   (-) Redness (-) Double vision (-) Blurry vision    Diabetic? (-)  A1c?  (No results found for: HGBA1C)        Last edited by Edwar Calderon, OD on 12/10/2019 12:05 PM. (History)            Assessment /Plan     For exam results, see Encounter Report.    Corneal scar, right eye    Myopia with astigmatism, bilateral    Reduced VA OD secondary to scar from resolved K ulcer. CLRx and SRx released to patient. Patient educated on lens options. Normal ocular health. RTC 1 year for routine exam.   Will call w/update in 1 week.

## 2019-12-12 ENCOUNTER — TELEPHONE (OUTPATIENT)
Dept: OPTOMETRY | Facility: CLINIC | Age: 39
End: 2019-12-12

## 2019-12-12 NOTE — TELEPHONE ENCOUNTER
Pt states that current prescription is not strong enough.  Will give it a week to try to adjust to the new prescription . Will call back with availability to scheduled one week f/u

## 2019-12-17 ENCOUNTER — TELEPHONE (OUTPATIENT)
Dept: OPTOMETRY | Facility: CLINIC | Age: 39
End: 2019-12-17

## 2020-01-02 ENCOUNTER — OFFICE VISIT (OUTPATIENT)
Dept: OPTOMETRY | Facility: CLINIC | Age: 40
End: 2020-01-02
Payer: COMMERCIAL

## 2020-01-02 DIAGNOSIS — H52.203 MYOPIA WITH ASTIGMATISM, BILATERAL: Primary | ICD-10-CM

## 2020-01-02 DIAGNOSIS — H52.13 MYOPIA WITH ASTIGMATISM, BILATERAL: Primary | ICD-10-CM

## 2020-01-02 PROCEDURE — 92499 PR CONTACT LENS F/U LEV 1: ICD-10-PCS | Mod: CSM,,, | Performed by: OPTOMETRIST

## 2020-01-02 PROCEDURE — 92499 UNLISTED OPH SVC/PROCEDURE: CPT | Mod: CSM,,, | Performed by: OPTOMETRIST

## 2020-01-06 NOTE — PROGRESS NOTES
HPI     Pt is coming in for blurry distance va OU   OD is more blurry than old contacts. OS isnt as sharp     Last edited by Sandra Van on 1/2/2020  4:18 PM. (History)            Assessment /Plan     For exam results, see Encounter Report.    Myopia with astigmatism, bilateral      Changed CLRx closer to old Rx to ease adaptation.   Pt finds new CLRx to be clearer immediately upon insertion.   CLRx released to the pt.   RTC 1 year.

## 2020-01-28 ENCOUNTER — TELEPHONE (OUTPATIENT)
Dept: ELECTROPHYSIOLOGY | Facility: CLINIC | Age: 40
End: 2020-01-28

## 2020-01-28 NOTE — TELEPHONE ENCOUNTER
Patient is due for an appointment. Can you please call and schedule? Ok to overbook, per Dr Bryson.  Thanks

## 2020-01-29 ENCOUNTER — TELEPHONE (OUTPATIENT)
Dept: ELECTROPHYSIOLOGY | Facility: CLINIC | Age: 40
End: 2020-01-29

## 2020-01-29 DIAGNOSIS — I49.01 VENTRICULAR FIBRILLATION: Primary | ICD-10-CM

## 2020-01-29 NOTE — TELEPHONE ENCOUNTER
Spoke with pt to schedule device check, ekg, and appt with Dr. Bryson on 3/11/2020.  Will sent appt reminder.

## 2020-02-19 ENCOUNTER — TELEPHONE (OUTPATIENT)
Dept: ELECTROPHYSIOLOGY | Facility: CLINIC | Age: 40
End: 2020-02-19

## 2020-02-19 NOTE — TELEPHONE ENCOUNTER
L/m for pt to give us a call to see if she wouldn't mind r/s appt to a later time on 3/11/2020. Asked her to give us a call back.

## 2020-03-10 ENCOUNTER — TELEPHONE (OUTPATIENT)
Dept: ELECTROPHYSIOLOGY | Facility: CLINIC | Age: 40
End: 2020-03-10

## 2020-03-10 NOTE — PROGRESS NOTES
Subjective:    Patient ID:  Arianna Duval is a 39 y.o. 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     Background:     SCD episode occurred 6 wks post-partum, triggered with startle + stress. Dual chamber ICD implanted 6/10/11 (Dr. Hyman), placed on Toprol. Has not had events to date.   Device interrogation reveals stable function of leads, RA pacing 47%, no RV pacing.  At her last clinic appt 6/2017 she was feeling great. Was considering breast lift, has questions regarding timing of this compared to generator change.  Also noted device is dropping a bit.  Dr. Bryson advised to proceed with breast lift before gen change. Rate was to be decreased to 50bpm to preserve battery. Reduced to 55 in clinic, as of device check 2/2018 it is still 55.     9/20/2018 generator change and pocket revision.     Update:    Device interrogation reveals stable function of the leads, minimal RA and RV pacing, no significant arrhythmias.  Feeling well. Continues to exercise. Happy with her energy level. Denies syncope, ICD shocks.    Review of Systems   Constitution: Negative. Negative for malaise/fatigue.   Cardiovascular: Negative for chest pain, dyspnea on exertion, irregular heartbeat, leg swelling, near-syncope, orthopnea, palpitations, paroxysmal nocturnal dyspnea and syncope.   Respiratory: Negative for cough and shortness of breath.    Neurological: Negative for dizziness, light-headedness and weakness.   All other systems reviewed and are negative.       Objective:    Physical Exam   Constitutional: She is oriented to person, place, and time. She appears well-developed and well-nourished.   Eyes: Conjunctivae are normal. No scleral icterus.   Neck: No JVD present. No tracheal deviation present.   Cardiovascular: Normal rate and regular rhythm. PMI  is not displaced.   Pulmonary/Chest: Effort normal and breath sounds normal. No respiratory distress.   Abdominal: Soft. There is no hepatosplenomegaly. There is no tenderness.   Musculoskeletal: She exhibits no edema or tenderness.   Neurological: She is alert and oriented to person, place, and time.   Skin: Skin is warm and dry. No rash noted.   Psychiatric: She has a normal mood and affect. Her behavior is normal.         Assessment:       1. Long Q-T syndrome    2. Ventricular fibrillation    3. Automatic implantable cardioverter-defibrillator in situ         Plan:             Doing well.  Continue current settings.  F/u with monitoring as scheduled, with me in one year.

## 2020-03-11 ENCOUNTER — CLINICAL SUPPORT (OUTPATIENT)
Dept: CARDIOLOGY | Facility: HOSPITAL | Age: 40
End: 2020-03-11
Attending: INTERNAL MEDICINE
Payer: COMMERCIAL

## 2020-03-11 ENCOUNTER — HOSPITAL ENCOUNTER (OUTPATIENT)
Dept: CARDIOLOGY | Facility: CLINIC | Age: 40
Discharge: HOME | End: 2020-03-11
Payer: COMMERCIAL

## 2020-03-11 ENCOUNTER — OFFICE VISIT (OUTPATIENT)
Dept: ELECTROPHYSIOLOGY | Facility: CLINIC | Age: 40
End: 2020-03-11
Payer: COMMERCIAL

## 2020-03-11 VITALS
BODY MASS INDEX: 20.93 KG/M2 | HEART RATE: 60 BPM | WEIGHT: 113.75 LBS | HEIGHT: 62 IN | SYSTOLIC BLOOD PRESSURE: 96 MMHG | DIASTOLIC BLOOD PRESSURE: 60 MMHG

## 2020-03-11 DIAGNOSIS — Z95.810 ICD (IMPLANTABLE CARDIOVERTER-DEFIBRILLATOR) IN PLACE: Primary | ICD-10-CM

## 2020-03-11 DIAGNOSIS — I45.81 LONG Q-T SYNDROME: Primary | ICD-10-CM

## 2020-03-11 DIAGNOSIS — Z95.810 ICD (IMPLANTABLE CARDIOVERTER-DEFIBRILLATOR) IN PLACE: ICD-10-CM

## 2020-03-11 DIAGNOSIS — I49.01 VENTRICULAR FIBRILLATION: ICD-10-CM

## 2020-03-11 DIAGNOSIS — Z95.810 AUTOMATIC IMPLANTABLE CARDIOVERTER-DEFIBRILLATOR IN SITU: ICD-10-CM

## 2020-03-11 PROCEDURE — 3008F BODY MASS INDEX DOCD: CPT | Mod: CPTII,S$GLB,, | Performed by: INTERNAL MEDICINE

## 2020-03-11 PROCEDURE — 93005 RHYTHM STRIP: ICD-10-PCS | Mod: S$GLB,,, | Performed by: INTERNAL MEDICINE

## 2020-03-11 PROCEDURE — 99999 PR PBB SHADOW E&M-EST. PATIENT-LVL III: ICD-10-PCS | Mod: PBBFAC,,, | Performed by: INTERNAL MEDICINE

## 2020-03-11 PROCEDURE — 93010 ELECTROCARDIOGRAM REPORT: CPT | Mod: S$GLB,,, | Performed by: INTERNAL MEDICINE

## 2020-03-11 PROCEDURE — 99999 PR PBB SHADOW E&M-EST. PATIENT-LVL III: CPT | Mod: PBBFAC,,, | Performed by: INTERNAL MEDICINE

## 2020-03-11 PROCEDURE — 93283 PRGRMG EVAL IMPLANTABLE DFB: CPT

## 2020-03-11 PROCEDURE — 93010 RHYTHM STRIP: ICD-10-PCS | Mod: S$GLB,,, | Performed by: INTERNAL MEDICINE

## 2020-03-11 PROCEDURE — 93005 ELECTROCARDIOGRAM TRACING: CPT | Mod: S$GLB,,, | Performed by: INTERNAL MEDICINE

## 2020-03-11 PROCEDURE — 99214 PR OFFICE/OUTPT VISIT, EST, LEVL IV, 30-39 MIN: ICD-10-PCS | Mod: S$GLB,,, | Performed by: INTERNAL MEDICINE

## 2020-03-11 PROCEDURE — 3008F PR BODY MASS INDEX (BMI) DOCUMENTED: ICD-10-PCS | Mod: CPTII,S$GLB,, | Performed by: INTERNAL MEDICINE

## 2020-03-11 PROCEDURE — 99214 OFFICE O/P EST MOD 30 MIN: CPT | Mod: S$GLB,,, | Performed by: INTERNAL MEDICINE

## 2020-03-17 ENCOUNTER — HOSPITAL ENCOUNTER (EMERGENCY)
Facility: HOSPITAL | Age: 40
Discharge: ANOTHER HOSPITAL | End: 2020-03-18
Attending: EMERGENCY MEDICINE
Payer: COMMERCIAL

## 2020-03-17 DIAGNOSIS — K35.201 ACUTE APPENDICITIS WITH PERFORATION AND GENERALIZED PERITONITIS, WITHOUT GANGRENE, UNSPECIFIED WHETHER ABSCESS PRESENT: ICD-10-CM

## 2020-03-17 DIAGNOSIS — R11.0 NAUSEA: ICD-10-CM

## 2020-03-17 DIAGNOSIS — R07.9 CHEST PAIN: ICD-10-CM

## 2020-03-17 DIAGNOSIS — R10.84 GENERALIZED ABDOMINAL PAIN: ICD-10-CM

## 2020-03-17 DIAGNOSIS — I47.20 VENTRICULAR TACHYCARDIA: Primary | ICD-10-CM

## 2020-03-17 PROBLEM — Z45.02 IMPLANTABLE CARDIOVERTER-DEFIBRILLATOR (ICD) AT END OF BATTERY LIFE: Status: RESOLVED | Noted: 2018-09-20 | Resolved: 2020-03-17

## 2020-03-17 PROBLEM — R10.9 ABDOMINAL PAIN: Status: ACTIVE | Noted: 2020-03-17

## 2020-03-17 PROBLEM — E83.42 HYPOMAGNESEMIA: Status: ACTIVE | Noted: 2020-03-17

## 2020-03-17 PROBLEM — D69.6 THROMBOCYTOPENIA: Status: ACTIVE | Noted: 2020-03-17

## 2020-03-17 PROBLEM — E87.6 HYPOKALEMIA: Status: ACTIVE | Noted: 2020-03-17

## 2020-03-17 LAB
ALBUMIN SERPL BCP-MCNC: 4.6 G/DL (ref 3.5–5.2)
ALP SERPL-CCNC: 56 U/L (ref 55–135)
ALT SERPL W/O P-5'-P-CCNC: 12 U/L (ref 10–44)
ANION GAP SERPL CALC-SCNC: 16 MMOL/L (ref 8–16)
AST SERPL-CCNC: 17 U/L (ref 10–40)
BASOPHILS # BLD AUTO: 0.01 K/UL (ref 0–0.2)
BASOPHILS NFR BLD: 0.1 % (ref 0–1.9)
BILIRUB SERPL-MCNC: 2.3 MG/DL (ref 0.1–1)
BNP SERPL-MCNC: 205 PG/ML (ref 0–99)
BUN SERPL-MCNC: 11 MG/DL (ref 6–20)
CALCIUM SERPL-MCNC: 9.3 MG/DL (ref 8.7–10.5)
CHLORIDE SERPL-SCNC: 99 MMOL/L (ref 95–110)
CO2 SERPL-SCNC: 20 MMOL/L (ref 23–29)
CREAT SERPL-MCNC: 0.7 MG/DL (ref 0.5–1.4)
DIFFERENTIAL METHOD: ABNORMAL
EOSINOPHIL # BLD AUTO: 0 K/UL (ref 0–0.5)
EOSINOPHIL NFR BLD: 0 % (ref 0–8)
ERYTHROCYTE [DISTWIDTH] IN BLOOD BY AUTOMATED COUNT: 11.8 % (ref 11.5–14.5)
EST. GFR  (AFRICAN AMERICAN): >60 ML/MIN/1.73 M^2
EST. GFR  (NON AFRICAN AMERICAN): >60 ML/MIN/1.73 M^2
GLUCOSE SERPL-MCNC: 188 MG/DL (ref 70–110)
HCG INTACT+B SERPL-ACNC: <0.6 MIU/ML
HCT VFR BLD AUTO: 37.6 % (ref 37–48.5)
HGB BLD-MCNC: 13 G/DL (ref 12–16)
IMM GRANULOCYTES # BLD AUTO: 0.03 K/UL (ref 0–0.04)
IMM GRANULOCYTES NFR BLD AUTO: 0.2 % (ref 0–0.5)
INFLUENZA A, MOLECULAR: NEGATIVE
INFLUENZA B, MOLECULAR: NEGATIVE
INR PPP: 1.2
LYMPHOCYTES # BLD AUTO: 0.4 K/UL (ref 1–4.8)
LYMPHOCYTES NFR BLD: 3 % (ref 18–48)
MAGNESIUM SERPL-MCNC: 1.3 MG/DL (ref 1.6–2.6)
MCH RBC QN AUTO: 33.6 PG (ref 27–31)
MCHC RBC AUTO-ENTMCNC: 34.6 G/DL (ref 32–36)
MCV RBC AUTO: 97 FL (ref 82–98)
MONOCYTES # BLD AUTO: 0.5 K/UL (ref 0.3–1)
MONOCYTES NFR BLD: 4.2 % (ref 4–15)
NEUTROPHILS # BLD AUTO: 11.3 K/UL (ref 1.8–7.7)
NEUTROPHILS NFR BLD: 92.5 % (ref 38–73)
NRBC BLD-RTO: 0 /100 WBC
PLATELET # BLD AUTO: 130 K/UL (ref 150–350)
PMV BLD AUTO: 11.9 FL (ref 9.2–12.9)
POTASSIUM SERPL-SCNC: 3.2 MMOL/L (ref 3.5–5.1)
PROCALCITONIN SERPL IA-MCNC: 0.38 NG/ML (ref 0–0.5)
PROT SERPL-MCNC: 7.6 G/DL (ref 6–8.4)
PROTHROMBIN TIME: 14.5 SEC (ref 10.6–14.8)
RBC # BLD AUTO: 3.87 M/UL (ref 4–5.4)
SODIUM SERPL-SCNC: 135 MMOL/L (ref 136–145)
SPECIMEN SOURCE: NORMAL
TROPONIN I SERPL DL<=0.01 NG/ML-MCNC: <0.03 NG/ML
WBC # BLD AUTO: 12.2 K/UL (ref 3.9–12.7)

## 2020-03-17 PROCEDURE — 84484 ASSAY OF TROPONIN QUANT: CPT

## 2020-03-17 PROCEDURE — 36415 COLL VENOUS BLD VENIPUNCTURE: CPT

## 2020-03-17 PROCEDURE — 93005 ELECTROCARDIOGRAM TRACING: CPT | Performed by: INTERNAL MEDICINE

## 2020-03-17 PROCEDURE — 96374 THER/PROPH/DIAG INJ IV PUSH: CPT | Mod: 59

## 2020-03-17 PROCEDURE — 80053 COMPREHEN METABOLIC PANEL: CPT

## 2020-03-17 PROCEDURE — 87502 INFLUENZA DNA AMP PROBE: CPT

## 2020-03-17 PROCEDURE — 99291 CRITICAL CARE FIRST HOUR: CPT | Mod: 25

## 2020-03-17 PROCEDURE — 25500020 PHARM REV CODE 255: Performed by: EMERGENCY MEDICINE

## 2020-03-17 PROCEDURE — 83880 ASSAY OF NATRIURETIC PEPTIDE: CPT

## 2020-03-17 PROCEDURE — 25000003 PHARM REV CODE 250: Performed by: STUDENT IN AN ORGANIZED HEALTH CARE EDUCATION/TRAINING PROGRAM

## 2020-03-17 PROCEDURE — 96365 THER/PROPH/DIAG IV INF INIT: CPT

## 2020-03-17 PROCEDURE — 96375 TX/PRO/DX INJ NEW DRUG ADDON: CPT | Mod: 59

## 2020-03-17 PROCEDURE — 85610 PROTHROMBIN TIME: CPT

## 2020-03-17 PROCEDURE — 83735 ASSAY OF MAGNESIUM: CPT

## 2020-03-17 PROCEDURE — 85025 COMPLETE CBC W/AUTO DIFF WBC: CPT

## 2020-03-17 PROCEDURE — 96366 THER/PROPH/DIAG IV INF ADDON: CPT

## 2020-03-17 PROCEDURE — 63600175 PHARM REV CODE 636 W HCPCS: Performed by: STUDENT IN AN ORGANIZED HEALTH CARE EDUCATION/TRAINING PROGRAM

## 2020-03-17 PROCEDURE — 84145 PROCALCITONIN (PCT): CPT

## 2020-03-17 PROCEDURE — 84702 CHORIONIC GONADOTROPIN TEST: CPT

## 2020-03-17 RX ORDER — MORPHINE SULFATE 2 MG/ML
2 INJECTION, SOLUTION INTRAMUSCULAR; INTRAVENOUS
Status: COMPLETED | OUTPATIENT
Start: 2020-03-17 | End: 2020-03-17

## 2020-03-17 RX ORDER — AMIODARONE HYDROCHLORIDE 150 MG/3ML
150 INJECTION, SOLUTION INTRAVENOUS
Status: DISCONTINUED | OUTPATIENT
Start: 2020-03-17 | End: 2020-03-17

## 2020-03-17 RX ORDER — METOPROLOL TARTRATE 25 MG/1
25 TABLET, FILM COATED ORAL 2 TIMES DAILY
Status: DISCONTINUED | OUTPATIENT
Start: 2020-03-17 | End: 2020-03-18 | Stop reason: HOSPADM

## 2020-03-17 RX ORDER — MAGNESIUM SULFATE HEPTAHYDRATE 40 MG/ML
2 INJECTION, SOLUTION INTRAVENOUS ONCE
Status: COMPLETED | OUTPATIENT
Start: 2020-03-17 | End: 2020-03-17

## 2020-03-17 RX ORDER — METOPROLOL TARTRATE 1 MG/ML
5 INJECTION, SOLUTION INTRAVENOUS
Status: COMPLETED | OUTPATIENT
Start: 2020-03-17 | End: 2020-03-17

## 2020-03-17 RX ORDER — POTASSIUM CHLORIDE 20 MEQ/15ML
40 SOLUTION ORAL ONCE
Status: DISCONTINUED | OUTPATIENT
Start: 2020-03-17 | End: 2020-03-18

## 2020-03-17 RX ORDER — ASPIRIN 325 MG
325 TABLET ORAL
Status: COMPLETED | OUTPATIENT
Start: 2020-03-17 | End: 2020-03-17

## 2020-03-17 RX ADMIN — METOPROLOL TARTRATE 25 MG: 25 TABLET ORAL at 10:03

## 2020-03-17 RX ADMIN — ASPIRIN 325 MG ORAL TABLET 325 MG: 325 PILL ORAL at 10:03

## 2020-03-17 RX ADMIN — MORPHINE SULFATE 2 MG: 2 INJECTION, SOLUTION INTRAMUSCULAR; INTRAVENOUS at 09:03

## 2020-03-17 RX ADMIN — MAGNESIUM SULFATE 2 G: 2 INJECTION INTRAVENOUS at 09:03

## 2020-03-17 RX ADMIN — IOHEXOL 100 ML: 350 INJECTION, SOLUTION INTRAVENOUS at 10:03

## 2020-03-17 RX ADMIN — METOPROLOL TARTRATE 5 MG: 1 INJECTION, SOLUTION INTRAVENOUS at 09:03

## 2020-03-18 ENCOUNTER — ANESTHESIA EVENT (OUTPATIENT)
Dept: SURGERY | Facility: HOSPITAL | Age: 40
DRG: 341 | End: 2020-03-18
Payer: COMMERCIAL

## 2020-03-18 ENCOUNTER — ANESTHESIA (OUTPATIENT)
Dept: SURGERY | Facility: HOSPITAL | Age: 40
DRG: 341 | End: 2020-03-18
Payer: COMMERCIAL

## 2020-03-18 ENCOUNTER — HOSPITAL ENCOUNTER (INPATIENT)
Facility: HOSPITAL | Age: 40
LOS: 1 days | Discharge: HOME | DRG: 341 | End: 2020-03-19
Attending: INTERNAL MEDICINE | Admitting: INTERNAL MEDICINE
Payer: COMMERCIAL

## 2020-03-18 VITALS
TEMPERATURE: 99 F | BODY MASS INDEX: 20.24 KG/M2 | WEIGHT: 110 LBS | RESPIRATION RATE: 15 BRPM | SYSTOLIC BLOOD PRESSURE: 105 MMHG | OXYGEN SATURATION: 98 % | DIASTOLIC BLOOD PRESSURE: 59 MMHG | HEIGHT: 62 IN | HEART RATE: 81 BPM

## 2020-03-18 DIAGNOSIS — I49.01 VENTRICULAR FIBRILLATION: ICD-10-CM

## 2020-03-18 DIAGNOSIS — I47.20 VT (VENTRICULAR TACHYCARDIA): ICD-10-CM

## 2020-03-18 DIAGNOSIS — K35.209 ACUTE APPENDICITIS WITH GENERALIZED PERITONITIS, UNSPECIFIED WHETHER ABSCESS PRESENT, UNSPECIFIED WHETHER GANGRENE PRESENT, UNSPECIFIED WHETHER PERFORATION PRESENT: Primary | ICD-10-CM

## 2020-03-18 DIAGNOSIS — I49.01 VF (VENTRICULAR FIBRILLATION): ICD-10-CM

## 2020-03-18 DIAGNOSIS — K35.30 ACUTE APPENDICITIS WITH LOCALIZED PERITONITIS, WITHOUT PERFORATION, ABSCESS, OR GANGRENE: ICD-10-CM

## 2020-03-18 PROBLEM — K35.80 ACUTE APPENDICITIS: Status: ACTIVE | Noted: 2020-03-17

## 2020-03-18 LAB
ABO + RH BLD: NORMAL
ALBUMIN SERPL BCP-MCNC: 3.7 G/DL (ref 3.5–5.2)
ALBUMIN SERPL BCP-MCNC: 4 G/DL (ref 3.5–5.2)
ALP SERPL-CCNC: 54 U/L (ref 55–135)
ALP SERPL-CCNC: 58 U/L (ref 55–135)
ALT SERPL W/O P-5'-P-CCNC: 9 U/L (ref 10–44)
ALT SERPL W/O P-5'-P-CCNC: 9 U/L (ref 10–44)
AMPHET+METHAMPHET UR QL: NEGATIVE
ANION GAP SERPL CALC-SCNC: 12 MMOL/L (ref 8–16)
ANION GAP SERPL CALC-SCNC: 9 MMOL/L (ref 8–16)
ANION GAP SERPL CALC-SCNC: 9 MMOL/L (ref 8–16)
APTT BLDCRRT: <21 SEC (ref 21–32)
ASCENDING AORTA: 2.75 CM
AST SERPL-CCNC: 17 U/L (ref 10–40)
AST SERPL-CCNC: 26 U/L (ref 10–40)
AV INDEX (PROSTH): 1.06
AV MEAN GRADIENT: 3 MMHG
AV PEAK GRADIENT: 6 MMHG
AV VALVE AREA: 3.39 CM2
AV VELOCITY RATIO: 0.99
BARBITURATES UR QL SCN>200 NG/ML: NEGATIVE
BASOPHILS # BLD AUTO: 0.02 K/UL (ref 0–0.2)
BASOPHILS # BLD AUTO: 0.03 K/UL (ref 0–0.2)
BASOPHILS NFR BLD: 0.1 % (ref 0–1.9)
BASOPHILS NFR BLD: 0.2 % (ref 0–1.9)
BENZODIAZ UR QL SCN>200 NG/ML: NEGATIVE
BILIRUB DIRECT SERPL-MCNC: 0.7 MG/DL (ref 0.1–0.3)
BILIRUB SERPL-MCNC: 1.4 MG/DL (ref 0.1–1)
BILIRUB SERPL-MCNC: 2 MG/DL (ref 0.1–1)
BILIRUB UR QL STRIP: NEGATIVE
BLD GP AB SCN CELLS X3 SERPL QL: NORMAL
BSA FOR ECHO PROCEDURE: 1.43 M2
BUN SERPL-MCNC: 6 MG/DL (ref 6–20)
BUN SERPL-MCNC: 8 MG/DL (ref 6–20)
BUN SERPL-MCNC: 8 MG/DL (ref 6–20)
BZE UR QL SCN: NEGATIVE
CALCIUM SERPL-MCNC: 7.9 MG/DL (ref 8.7–10.5)
CALCIUM SERPL-MCNC: 8.2 MG/DL (ref 8.7–10.5)
CALCIUM SERPL-MCNC: 8.3 MG/DL (ref 8.7–10.5)
CANNABINOIDS UR QL SCN: NEGATIVE
CHLORIDE SERPL-SCNC: 100 MMOL/L (ref 95–110)
CHLORIDE SERPL-SCNC: 106 MMOL/L (ref 95–110)
CHLORIDE SERPL-SCNC: 107 MMOL/L (ref 95–110)
CLARITY UR REFRACT.AUTO: CLEAR
CO2 SERPL-SCNC: 19 MMOL/L (ref 23–29)
CO2 SERPL-SCNC: 19 MMOL/L (ref 23–29)
CO2 SERPL-SCNC: 22 MMOL/L (ref 23–29)
COLOR UR AUTO: YELLOW
CREAT SERPL-MCNC: 0.6 MG/DL (ref 0.5–1.4)
CREAT SERPL-MCNC: 0.7 MG/DL (ref 0.5–1.4)
CREAT SERPL-MCNC: 0.7 MG/DL (ref 0.5–1.4)
CREAT UR-MCNC: 38 MG/DL (ref 15–325)
CV ECHO LV RWT: 0.4 CM
DIFFERENTIAL METHOD: ABNORMAL
DIFFERENTIAL METHOD: ABNORMAL
DOP CALC AO PEAK VEL: 1.18 M/S
DOP CALC AO VTI: 23.72 CM
DOP CALC LVOT AREA: 3.2 CM2
DOP CALC LVOT DIAMETER: 2.02 CM
DOP CALC LVOT PEAK VEL: 1.17 M/S
DOP CALC LVOT STROKE VOLUME: 80.37 CM3
DOP CALCLVOT PEAK VEL VTI: 25.09 CM
E WAVE DECELERATION TIME: 146.73 MSEC
E/A RATIO: 1.25
E/E' RATIO: 4.93 M/S
ECHO LV POSTERIOR WALL: 0.88 CM (ref 0.6–1.1)
EOSINOPHIL # BLD AUTO: 0 K/UL (ref 0–0.5)
EOSINOPHIL # BLD AUTO: 0 K/UL (ref 0–0.5)
EOSINOPHIL NFR BLD: 0 % (ref 0–8)
EOSINOPHIL NFR BLD: 0.1 % (ref 0–8)
ERYTHROCYTE [DISTWIDTH] IN BLOOD BY AUTOMATED COUNT: 12.1 % (ref 11.5–14.5)
ERYTHROCYTE [DISTWIDTH] IN BLOOD BY AUTOMATED COUNT: 12.3 % (ref 11.5–14.5)
EST. GFR  (AFRICAN AMERICAN): >60 ML/MIN/1.73 M^2
EST. GFR  (NON AFRICAN AMERICAN): >60 ML/MIN/1.73 M^2
ESTIMATED AVG GLUCOSE: 91 MG/DL (ref 68–131)
ETHANOL UR-MCNC: <10 MG/DL
FRACTIONAL SHORTENING: 48 % (ref 28–44)
GLUCOSE SERPL-MCNC: 123 MG/DL (ref 70–110)
GLUCOSE SERPL-MCNC: 169 MG/DL (ref 70–110)
GLUCOSE SERPL-MCNC: 238 MG/DL (ref 70–110)
GLUCOSE UR QL STRIP: ABNORMAL
HBA1C MFR BLD HPLC: 4.8 % (ref 4–5.6)
HCT VFR BLD AUTO: 35.6 % (ref 37–48.5)
HCT VFR BLD AUTO: 36.1 % (ref 37–48.5)
HGB BLD-MCNC: 11.8 G/DL (ref 12–16)
HGB BLD-MCNC: 12 G/DL (ref 12–16)
HGB UR QL STRIP: NEGATIVE
IMM GRANULOCYTES # BLD AUTO: 0.11 K/UL (ref 0–0.04)
IMM GRANULOCYTES # BLD AUTO: 0.21 K/UL (ref 0–0.04)
IMM GRANULOCYTES NFR BLD AUTO: 0.7 % (ref 0–0.5)
IMM GRANULOCYTES NFR BLD AUTO: 1.3 % (ref 0–0.5)
INR PPP: 1 (ref 0.8–1.2)
INTERVENTRICULAR SEPTUM: 0.66 CM (ref 0.6–1.1)
KETONES UR QL STRIP: ABNORMAL
LA MAJOR: 4.87 CM
LA MINOR: 4.63 CM
LA WIDTH: 3.58 CM
LACTATE SERPL-SCNC: 1.6 MMOL/L (ref 0.5–2.2)
LEFT ATRIUM SIZE: 2.66 CM
LEFT ATRIUM VOLUME INDEX: 26.7 ML/M2
LEFT ATRIUM VOLUME: 38.42 CM3
LEFT INTERNAL DIMENSION IN SYSTOLE: 2.26 CM (ref 2.1–4)
LEFT VENTRICLE DIASTOLIC VOLUME INDEX: 59.61 ML/M2
LEFT VENTRICLE DIASTOLIC VOLUME: 85.95 ML
LEFT VENTRICLE MASS INDEX: 71 G/M2
LEFT VENTRICLE SYSTOLIC VOLUME INDEX: 12 ML/M2
LEFT VENTRICLE SYSTOLIC VOLUME: 17.32 ML
LEFT VENTRICULAR INTERNAL DIMENSION IN DIASTOLE: 4.36 CM (ref 3.5–6)
LEFT VENTRICULAR MASS: 102.52 G
LEUKOCYTE ESTERASE UR QL STRIP: NEGATIVE
LIPASE SERPL-CCNC: 16 U/L (ref 4–60)
LV LATERAL E/E' RATIO: 4.31 M/S
LV SEPTAL E/E' RATIO: 5.75 M/S
LYMPHOCYTES # BLD AUTO: 0.5 K/UL (ref 1–4.8)
LYMPHOCYTES # BLD AUTO: 0.8 K/UL (ref 1–4.8)
LYMPHOCYTES NFR BLD: 2.8 % (ref 18–48)
LYMPHOCYTES NFR BLD: 4.8 % (ref 18–48)
MAGNESIUM SERPL-MCNC: 2.2 MG/DL (ref 1.6–2.6)
MAGNESIUM SERPL-MCNC: 2.4 MG/DL (ref 1.6–2.6)
MAGNESIUM SERPL-MCNC: 2.5 MG/DL (ref 1.6–2.6)
MCH RBC QN AUTO: 33.3 PG (ref 27–31)
MCH RBC QN AUTO: 34.3 PG (ref 27–31)
MCHC RBC AUTO-ENTMCNC: 32.7 G/DL (ref 32–36)
MCHC RBC AUTO-ENTMCNC: 33.7 G/DL (ref 32–36)
MCV RBC AUTO: 102 FL (ref 82–98)
MCV RBC AUTO: 102 FL (ref 82–98)
METHADONE UR QL SCN>300 NG/ML: NEGATIVE
MONOCYTES # BLD AUTO: 1 K/UL (ref 0.3–1)
MONOCYTES # BLD AUTO: 1 K/UL (ref 0.3–1)
MONOCYTES NFR BLD: 5.9 % (ref 4–15)
MONOCYTES NFR BLD: 6.3 % (ref 4–15)
MV PEAK A VEL: 0.55 M/S
MV PEAK E VEL: 0.69 M/S
NEUTROPHILS # BLD AUTO: 14.3 K/UL (ref 1.8–7.7)
NEUTROPHILS # BLD AUTO: 14.7 K/UL (ref 1.8–7.7)
NEUTROPHILS NFR BLD: 87.4 % (ref 38–73)
NEUTROPHILS NFR BLD: 90.4 % (ref 38–73)
NITRITE UR QL STRIP: NEGATIVE
NRBC BLD-RTO: 0 /100 WBC
NRBC BLD-RTO: 0 /100 WBC
OPIATES UR QL SCN: NORMAL
PCP UR QL SCN>25 NG/ML: NEGATIVE
PH UR STRIP: 8 [PH] (ref 5–8)
PISA TR MAX VEL: 2.34 M/S
PLATELET # BLD AUTO: 122 K/UL (ref 150–350)
PLATELET # BLD AUTO: 122 K/UL (ref 150–350)
PMV BLD AUTO: 11.5 FL (ref 9.2–12.9)
PMV BLD AUTO: 12 FL (ref 9.2–12.9)
POCT GLUCOSE: 167 MG/DL (ref 70–110)
POTASSIUM SERPL-SCNC: 3.7 MMOL/L (ref 3.5–5.1)
POTASSIUM SERPL-SCNC: 4.2 MMOL/L (ref 3.5–5.1)
POTASSIUM SERPL-SCNC: 4.8 MMOL/L (ref 3.5–5.1)
PROT SERPL-MCNC: 7.1 G/DL (ref 6–8.4)
PROT SERPL-MCNC: 7.2 G/DL (ref 6–8.4)
PROT UR QL STRIP: NEGATIVE
PROTHROMBIN TIME: 10.4 SEC (ref 9–12.5)
PULM VEIN S/D RATIO: 1.51
PV PEAK D VEL: 0.41 M/S
PV PEAK S VEL: 0.62 M/S
RA MAJOR: 4.59 CM
RA PRESSURE: 8 MMHG
RA WIDTH: 3.04 CM
RBC # BLD AUTO: 3.5 M/UL (ref 4–5.4)
RBC # BLD AUTO: 3.54 M/UL (ref 4–5.4)
RIGHT VENTRICULAR END-DIASTOLIC DIMENSION: 3.55 CM
SINUS: 2.67 CM
SODIUM SERPL-SCNC: 134 MMOL/L (ref 136–145)
SODIUM SERPL-SCNC: 134 MMOL/L (ref 136–145)
SODIUM SERPL-SCNC: 135 MMOL/L (ref 136–145)
SP GR UR STRIP: 1.01 (ref 1–1.03)
STJ: 2.47 CM
TDI LATERAL: 0.16 M/S
TDI SEPTAL: 0.12 M/S
TDI: 0.14 M/S
TOXICOLOGY INFORMATION: NORMAL
TR MAX PG: 22 MMHG
TRICUSPID ANNULAR PLANE SYSTOLIC EXCURSION: 2.38 CM
TROPONIN I SERPL DL<=0.01 NG/ML-MCNC: 0.03 NG/ML (ref 0–0.03)
TV REST PULMONARY ARTERY PRESSURE: 30 MMHG
URN SPEC COLLECT METH UR: ABNORMAL
WBC # BLD AUTO: 16.22 K/UL (ref 3.9–12.7)
WBC # BLD AUTO: 16.3 K/UL (ref 3.9–12.7)

## 2020-03-18 PROCEDURE — 82248 BILIRUBIN DIRECT: CPT

## 2020-03-18 PROCEDURE — 63600175 PHARM REV CODE 636 W HCPCS: Performed by: STUDENT IN AN ORGANIZED HEALTH CARE EDUCATION/TRAINING PROGRAM

## 2020-03-18 PROCEDURE — 83735 ASSAY OF MAGNESIUM: CPT | Mod: 91

## 2020-03-18 PROCEDURE — 96375 TX/PRO/DX INJ NEW DRUG ADDON: CPT

## 2020-03-18 PROCEDURE — 83605 ASSAY OF LACTIC ACID: CPT

## 2020-03-18 PROCEDURE — 27201423 OPTIME MED/SURG SUP & DEVICES STERILE SUPPLY: Performed by: SURGERY

## 2020-03-18 PROCEDURE — 36000709 HC OR TIME LEV III EA ADD 15 MIN: Performed by: SURGERY

## 2020-03-18 PROCEDURE — 85025 COMPLETE CBC W/AUTO DIFF WBC: CPT | Mod: 91

## 2020-03-18 PROCEDURE — 81003 URINALYSIS AUTO W/O SCOPE: CPT

## 2020-03-18 PROCEDURE — 63600175 PHARM REV CODE 636 W HCPCS: Performed by: INTERNAL MEDICINE

## 2020-03-18 PROCEDURE — 83735 ASSAY OF MAGNESIUM: CPT

## 2020-03-18 PROCEDURE — 27201037 HC PRESSURE MONITORING SET UP

## 2020-03-18 PROCEDURE — 44970 PR LAP,APPENDECTOMY: ICD-10-PCS | Mod: ,,, | Performed by: SURGERY

## 2020-03-18 PROCEDURE — 37000009 HC ANESTHESIA EA ADD 15 MINS: Performed by: SURGERY

## 2020-03-18 PROCEDURE — 44970 LAPAROSCOPY APPENDECTOMY: CPT | Mod: ,,, | Performed by: SURGERY

## 2020-03-18 PROCEDURE — 85610 PROTHROMBIN TIME: CPT

## 2020-03-18 PROCEDURE — 20600001 HC STEP DOWN PRIVATE ROOM

## 2020-03-18 PROCEDURE — 80307 DRUG TEST PRSMV CHEM ANLYZR: CPT

## 2020-03-18 PROCEDURE — 83690 ASSAY OF LIPASE: CPT

## 2020-03-18 PROCEDURE — 99222 PR INITIAL HOSPITAL CARE,LEVL II: ICD-10-PCS | Mod: ,,, | Performed by: INTERNAL MEDICINE

## 2020-03-18 PROCEDURE — D9220A PRA ANESTHESIA: ICD-10-PCS | Mod: ANES,,, | Performed by: ANESTHESIOLOGY

## 2020-03-18 PROCEDURE — 94761 N-INVAS EAR/PLS OXIMETRY MLT: CPT

## 2020-03-18 PROCEDURE — 25000003 PHARM REV CODE 250: Performed by: ANESTHESIOLOGY

## 2020-03-18 PROCEDURE — 88304 PR  SURG PATH,LEVEL III: ICD-10-PCS | Mod: 26,,, | Performed by: PATHOLOGY

## 2020-03-18 PROCEDURE — 63600175 PHARM REV CODE 636 W HCPCS: Performed by: NURSE ANESTHETIST, CERTIFIED REGISTERED

## 2020-03-18 PROCEDURE — D9220A PRA ANESTHESIA: ICD-10-PCS | Mod: CRNA,,, | Performed by: NURSE ANESTHETIST, CERTIFIED REGISTERED

## 2020-03-18 PROCEDURE — 63600175 PHARM REV CODE 636 W HCPCS: Performed by: ANESTHESIOLOGY

## 2020-03-18 PROCEDURE — 99222 1ST HOSP IP/OBS MODERATE 55: CPT | Mod: ,,, | Performed by: INTERNAL MEDICINE

## 2020-03-18 PROCEDURE — 99221 1ST HOSP IP/OBS SF/LOW 40: CPT | Mod: ,,, | Performed by: SURGERY

## 2020-03-18 PROCEDURE — D9220A PRA ANESTHESIA: Mod: ANES,,, | Performed by: ANESTHESIOLOGY

## 2020-03-18 PROCEDURE — 88304 TISSUE EXAM BY PATHOLOGIST: CPT | Performed by: PATHOLOGY

## 2020-03-18 PROCEDURE — 36000708 HC OR TIME LEV III 1ST 15 MIN: Performed by: SURGERY

## 2020-03-18 PROCEDURE — 99221 PR INITIAL HOSPITAL CARE,LEVL I: ICD-10-PCS | Mod: ,,, | Performed by: SURGERY

## 2020-03-18 PROCEDURE — 86850 RBC ANTIBODY SCREEN: CPT

## 2020-03-18 PROCEDURE — 80048 BASIC METABOLIC PNL TOTAL CA: CPT

## 2020-03-18 PROCEDURE — 96376 TX/PRO/DX INJ SAME DRUG ADON: CPT

## 2020-03-18 PROCEDURE — 37000008 HC ANESTHESIA 1ST 15 MINUTES: Performed by: SURGERY

## 2020-03-18 PROCEDURE — 85730 THROMBOPLASTIN TIME PARTIAL: CPT

## 2020-03-18 PROCEDURE — 63600175 PHARM REV CODE 636 W HCPCS: Performed by: EMERGENCY MEDICINE

## 2020-03-18 PROCEDURE — 84484 ASSAY OF TROPONIN QUANT: CPT

## 2020-03-18 PROCEDURE — 80053 COMPREHEN METABOLIC PANEL: CPT

## 2020-03-18 PROCEDURE — 99223 PR INITIAL HOSPITAL CARE,LEVL III: ICD-10-PCS | Mod: ,,, | Performed by: INTERNAL MEDICINE

## 2020-03-18 PROCEDURE — 88304 TISSUE EXAM BY PATHOLOGIST: CPT | Mod: 26,,, | Performed by: PATHOLOGY

## 2020-03-18 PROCEDURE — 80053 COMPREHEN METABOLIC PANEL: CPT | Mod: 91

## 2020-03-18 PROCEDURE — 25000003 PHARM REV CODE 250: Performed by: SURGERY

## 2020-03-18 PROCEDURE — 99223 1ST HOSP IP/OBS HIGH 75: CPT | Mod: ,,, | Performed by: INTERNAL MEDICINE

## 2020-03-18 PROCEDURE — 25000003 PHARM REV CODE 250: Performed by: NURSE ANESTHETIST, CERTIFIED REGISTERED

## 2020-03-18 PROCEDURE — D9220A PRA ANESTHESIA: Mod: CRNA,,, | Performed by: NURSE ANESTHETIST, CERTIFIED REGISTERED

## 2020-03-18 PROCEDURE — 93005 ELECTROCARDIOGRAM TRACING: CPT | Performed by: INTERNAL MEDICINE

## 2020-03-18 PROCEDURE — 83036 HEMOGLOBIN GLYCOSYLATED A1C: CPT

## 2020-03-18 RX ORDER — METOPROLOL TARTRATE 25 MG/1
25 TABLET, FILM COATED ORAL 2 TIMES DAILY
Status: DISCONTINUED | OUTPATIENT
Start: 2020-03-18 | End: 2020-03-18

## 2020-03-18 RX ORDER — PROPOFOL 10 MG/ML
VIAL (ML) INTRAVENOUS
Status: DISCONTINUED | OUTPATIENT
Start: 2020-03-18 | End: 2020-03-18

## 2020-03-18 RX ORDER — POTASSIUM CHLORIDE 7.45 MG/ML
40 INJECTION INTRAVENOUS ONCE
Status: COMPLETED | OUTPATIENT
Start: 2020-03-18 | End: 2020-03-18

## 2020-03-18 RX ORDER — METOPROLOL SUCCINATE 25 MG/1
25 TABLET, EXTENDED RELEASE ORAL DAILY
Status: DISCONTINUED | OUTPATIENT
Start: 2020-03-18 | End: 2020-03-19

## 2020-03-18 RX ORDER — FENTANYL CITRATE 50 UG/ML
INJECTION, SOLUTION INTRAMUSCULAR; INTRAVENOUS
Status: DISCONTINUED | OUTPATIENT
Start: 2020-03-18 | End: 2020-03-18

## 2020-03-18 RX ORDER — FENTANYL CITRATE 50 UG/ML
25 INJECTION, SOLUTION INTRAMUSCULAR; INTRAVENOUS EVERY 5 MIN PRN
Status: CANCELLED | OUTPATIENT
Start: 2020-03-18

## 2020-03-18 RX ORDER — LIDOCAINE HYDROCHLORIDE 20 MG/ML
INJECTION INTRAVENOUS
Status: DISCONTINUED | OUTPATIENT
Start: 2020-03-18 | End: 2020-03-18

## 2020-03-18 RX ORDER — MAGNESIUM SULFATE 1 G/100ML
1 INJECTION INTRAVENOUS ONCE
Status: COMPLETED | OUTPATIENT
Start: 2020-03-18 | End: 2020-03-18

## 2020-03-18 RX ORDER — SODIUM CHLORIDE 0.9 % (FLUSH) 0.9 %
10 SYRINGE (ML) INJECTION
Status: CANCELLED | OUTPATIENT
Start: 2020-03-18

## 2020-03-18 RX ORDER — BUPIVACAINE HYDROCHLORIDE 2.5 MG/ML
INJECTION, SOLUTION EPIDURAL; INFILTRATION; INTRACAUDAL
Status: DISCONTINUED | OUTPATIENT
Start: 2020-03-18 | End: 2020-03-18 | Stop reason: HOSPADM

## 2020-03-18 RX ORDER — POTASSIUM CHLORIDE 7.45 MG/ML
10 INJECTION INTRAVENOUS ONCE
Status: COMPLETED | OUTPATIENT
Start: 2020-03-18 | End: 2020-03-18

## 2020-03-18 RX ORDER — MIDAZOLAM HYDROCHLORIDE 1 MG/ML
INJECTION, SOLUTION INTRAMUSCULAR; INTRAVENOUS
Status: DISCONTINUED | OUTPATIENT
Start: 2020-03-18 | End: 2020-03-18

## 2020-03-18 RX ORDER — ROCURONIUM BROMIDE 10 MG/ML
INJECTION, SOLUTION INTRAVENOUS
Status: DISCONTINUED | OUTPATIENT
Start: 2020-03-18 | End: 2020-03-18

## 2020-03-18 RX ORDER — PHENYLEPHRINE HYDROCHLORIDE 10 MG/ML
INJECTION INTRAVENOUS
Status: DISCONTINUED | OUTPATIENT
Start: 2020-03-18 | End: 2020-03-18

## 2020-03-18 RX ORDER — MORPHINE SULFATE 2 MG/ML
2 INJECTION, SOLUTION INTRAMUSCULAR; INTRAVENOUS EVERY 4 HOURS PRN
Status: DISCONTINUED | OUTPATIENT
Start: 2020-03-18 | End: 2020-03-19 | Stop reason: HOSPADM

## 2020-03-18 RX ORDER — MORPHINE SULFATE 4 MG/ML
4 INJECTION, SOLUTION INTRAMUSCULAR; INTRAVENOUS
Status: COMPLETED | OUTPATIENT
Start: 2020-03-18 | End: 2020-03-18

## 2020-03-18 RX ORDER — SODIUM CHLORIDE 0.9 % (FLUSH) 0.9 %
10 SYRINGE (ML) INJECTION
Status: DISCONTINUED | OUTPATIENT
Start: 2020-03-18 | End: 2020-03-19 | Stop reason: HOSPADM

## 2020-03-18 RX ORDER — DEXAMETHASONE SODIUM PHOSPHATE 4 MG/ML
INJECTION, SOLUTION INTRA-ARTICULAR; INTRALESIONAL; INTRAMUSCULAR; INTRAVENOUS; SOFT TISSUE
Status: DISCONTINUED | OUTPATIENT
Start: 2020-03-18 | End: 2020-03-18

## 2020-03-18 RX ADMIN — MORPHINE SULFATE 2 MG: 2 INJECTION, SOLUTION INTRAMUSCULAR; INTRAVENOUS at 06:03

## 2020-03-18 RX ADMIN — POTASSIUM CHLORIDE 10 MEQ: 7.46 INJECTION, SOLUTION INTRAVENOUS at 12:03

## 2020-03-18 RX ADMIN — PROPOFOL 50 MG: 10 INJECTION, EMULSION INTRAVENOUS at 12:03

## 2020-03-18 RX ADMIN — FENTANYL CITRATE 75 MCG: 50 INJECTION, SOLUTION INTRAMUSCULAR; INTRAVENOUS at 12:03

## 2020-03-18 RX ADMIN — LIDOCAINE HYDROCHLORIDE 80 MG: 20 INJECTION, SOLUTION INTRAVENOUS at 12:03

## 2020-03-18 RX ADMIN — ROCURONIUM BROMIDE 50 MG: 10 INJECTION, SOLUTION INTRAVENOUS at 12:03

## 2020-03-18 RX ADMIN — POTASSIUM CHLORIDE 40 MEQ: 7.46 INJECTION, SOLUTION INTRAVENOUS at 03:03

## 2020-03-18 RX ADMIN — SODIUM CHLORIDE, SODIUM GLUCONATE, SODIUM ACETATE, POTASSIUM CHLORIDE, MAGNESIUM CHLORIDE, SODIUM PHOSPHATE, DIBASIC, AND POTASSIUM PHOSPHATE: .53; .5; .37; .037; .03; .012; .00082 INJECTION, SOLUTION INTRAVENOUS at 11:03

## 2020-03-18 RX ADMIN — PHENYLEPHRINE HYDROCHLORIDE 200 MCG: 10 INJECTION INTRAVENOUS at 12:03

## 2020-03-18 RX ADMIN — MORPHINE SULFATE 2 MG: 2 INJECTION, SOLUTION INTRAMUSCULAR; INTRAVENOUS at 03:03

## 2020-03-18 RX ADMIN — MORPHINE SULFATE 2 MG: 2 INJECTION, SOLUTION INTRAMUSCULAR; INTRAVENOUS at 07:03

## 2020-03-18 RX ADMIN — DEXAMETHASONE SODIUM PHOSPHATE 4 MG: 4 INJECTION, SOLUTION INTRAMUSCULAR; INTRAVENOUS at 12:03

## 2020-03-18 RX ADMIN — PIPERACILLIN AND TAZOBACTAM 4.5 G: 4; .5 INJECTION, POWDER, LYOPHILIZED, FOR SOLUTION INTRAVENOUS; PARENTERAL at 12:03

## 2020-03-18 RX ADMIN — MIDAZOLAM HYDROCHLORIDE 2 MG: 1 INJECTION, SOLUTION INTRAMUSCULAR; INTRAVENOUS at 11:03

## 2020-03-18 RX ADMIN — MORPHINE SULFATE 4 MG: 4 INJECTION INTRAVENOUS at 12:03

## 2020-03-18 RX ADMIN — PIPERACILLIN AND TAZOBACTAM 4.5 G: 4; .5 INJECTION, POWDER, LYOPHILIZED, FOR SOLUTION INTRAVENOUS; PARENTERAL at 03:03

## 2020-03-18 RX ADMIN — PIPERACILLIN AND TAZOBACTAM 4.5 G: 4; .5 INJECTION, POWDER, LYOPHILIZED, FOR SOLUTION INTRAVENOUS; PARENTERAL at 07:03

## 2020-03-18 RX ADMIN — PROPOFOL 130 MG: 10 INJECTION, EMULSION INTRAVENOUS at 12:03

## 2020-03-18 RX ADMIN — MORPHINE SULFATE 2 MG: 2 INJECTION, SOLUTION INTRAMUSCULAR; INTRAVENOUS at 11:03

## 2020-03-18 RX ADMIN — SUGAMMADEX 187 MG: 100 INJECTION, SOLUTION INTRAVENOUS at 12:03

## 2020-03-18 RX ADMIN — MAGNESIUM SULFATE IN DEXTROSE 1 G: 10 INJECTION, SOLUTION INTRAVENOUS at 12:03

## 2020-03-18 RX ADMIN — SODIUM CHLORIDE, SODIUM GLUCONATE, SODIUM ACETATE, POTASSIUM CHLORIDE, MAGNESIUM CHLORIDE, SODIUM PHOSPHATE, DIBASIC, AND POTASSIUM PHOSPHATE: .53; .5; .37; .037; .03; .012; .00082 INJECTION, SOLUTION INTRAVENOUS at 12:03

## 2020-03-18 NOTE — ED NOTES
At [facility], Arianna Duval is AA & O x 3, her skin is warm and dry, report and paperwork given to EMS personel

## 2020-03-18 NOTE — SUBJECTIVE & OBJECTIVE
Interval History: ***    Review of Systems   Constitution: Positive for chills, fever and malaise/fatigue.   Cardiovascular: Positive for chest pain. Negative for claudication, dyspnea on exertion, irregular heartbeat, leg swelling, orthopnea, palpitations and paroxysmal nocturnal dyspnea.   Respiratory: Positive for cough and shortness of breath.    Musculoskeletal: Negative for joint pain and joint swelling.   Gastrointestinal: Positive for abdominal pain, nausea and vomiting.   Neurological: Positive for light-headedness and weakness. Negative for focal weakness and headaches.   All other systems reviewed and are negative.    Objective:     Vital Signs (Most Recent):  Temp: 98.2 °F (36.8 °C) (20 0701)  Pulse: (!) 58 (20 0915)  Resp: 17 (20 0915)  BP: 109/72 (20 0900)  SpO2: 100 % (20 0915) Vital Signs (24h Range):  Temp:  [98 °F (36.7 °C)-99.1 °F (37.3 °C)] 98.2 °F (36.8 °C)  Pulse:  [] 58  Resp:  [15-51] 17  SpO2:  [96 %-100 %] 100 %  BP: (103-149)/() 109/72     Weight: 47.1 kg (103 lb 13.4 oz)  Body mass index is 18.99 kg/m².     SpO2: 100 %  O2 Device (Oxygen Therapy): room air    Physical Exam    Significant Labs: {Labs:56902}    Significant Imaging: {Select Imagin}

## 2020-03-18 NOTE — PROGRESS NOTES
Pt transferred to OR by nurse. Pt connected to continuous monitoring and on RA. Family notified. Will continue to monitor.

## 2020-03-18 NOTE — ASSESSMENT & PLAN NOTE
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 Bryson. Recently seen in clinic on 3/11/2020 with no issues at that time. Device interrogation with no events. On Toprol XL 25 mg daily. ICD in place since 2011 with generator change 10/2018.

## 2020-03-18 NOTE — CONSULTS
[facility]  General Surgery  Consult Note    Consults  Subjective:     Chief Complaint/Reason for Admission: Appendicitis, VT with defibrillation     History of Present Illness: 40 y/o F with AICD for long QT syndrome. She was on the [facility] whne she started having abdominal pain that started yesterday morning.  Pain gradually increased and pt started having N/V.  She was on the way to the hospital when she started feeling acutely tired, and her AICD went off.  [place] was [place] [facility] [place] higher level of care.      Current Facility-Administered Medications on File Prior to Encounter   Medication    0.9%  NaCl infusion    [COMPLETED] aspirin tablet 325 mg    [COMPLETED] iohexoL (OMNIPAQUE 350) injection 100 mL    [COMPLETED] magnesium sulfate 2g in water 50mL IVPB (premix)    [COMPLETED] magnesium sulfate in dextrose IVPB (premix) 1 g    [COMPLETED] metoprolol injection 5 mg    [COMPLETED] morphine injection 2 mg    [COMPLETED] morphine injection 4 mg    [COMPLETED] piperacillin-tazobactam 4.5 g in dextrose 5 % 100 mL IVPB (ready to mix system)    [COMPLETED] potassium chloride 10 mEq in 100 mL IVPB    [DISCONTINUED] amiodarone 360 mg/200 mL (1.8 mg/mL) infusion    [DISCONTINUED] amiodarone injection 150 mg    [DISCONTINUED] metoprolol tartrate (LOPRESSOR) tablet 25 mg    [DISCONTINUED] potassium chloride 10% oral solution 40 mEq     Current Outpatient Medications on File Prior to Encounter   Medication Sig    levocetirizine (XYZAL) 5 MG tablet Take 1/2 to 1 tab po qhs prn hives (Patient not taking: Reported on 3/11/2020)    metoprolol succinate (TOPROL-XL) 25 MG 24 hr tablet Take 1 tablet (25 mg total) by mouth once daily.    potassium chloride SA (K-DUR,KLOR-CON) 20 MEQ tablet Take 1 tablet (20 mEq total) by mouth once daily.    triamcinolone acetonide 0.1% (KENALOG) 0.1 % cream AAA BID x 1-2 wks then prn flares only. Avoid face, armpits, groin (Patient not taking:  Reported on 3/11/2020)    VITRON-C 65 mg iron- 125 mg TbEC Take 1 tablet by mouth once daily.       Review of patient's allergies indicates:   Allergen Reactions    Sulfa (sulfonamide antibiotics)        Past Medical History:   Diagnosis Date    Abnormal Pap smear     Abnormal Pap smear of cervix     Abnormal Pap smear of vagina     Acute blood loss anemia 8/31/2013    Defibrillator discharge     Long Q-T syndrome      Past Surgical History:   Procedure Laterality Date    breast reduction      CARDIAC PACEMAKER PLACEMENT      COLPOSCOPY      REPLACEMENT OF IMPLANTABLE CARDIOVERTER-DEFIBRILLATOR (ICD) GENERATOR N/A 9/20/2018    Procedure: REPLACEMENT, PULSE GENERATOR, ICD;  Surgeon: Flex Bryson MD;  Location: [facility] CATH LAB;  Service: Cardiology;  Laterality: N/A;  BRANDO, Dual ICD gen change, MDT, MAC, SK, 3 Prep     Family History     Problem Relation (Age of Onset)    Breast cancer Maternal Grandmother, Maternal Aunt    Long QT syndrome Mother, Brother        Tobacco Use    Smoking status: Never Smoker    Smokeless tobacco: Never Used   Substance and Sexual Activity    Alcohol use: Yes     Comment: socially    Drug use: No    Sexual activity: Yes     Partners: Male     Birth control/protection: None, Partner-Vasectomy     Review of Systems   Constitutional: Negative for fever.   HENT: Negative.    Eyes: Negative.    Respiratory: Negative.    Cardiovascular: Negative.    Gastrointestinal: Positive for abdominal pain, nausea and vomiting.   Endocrine: Negative.    Genitourinary: Negative.    Musculoskeletal: Negative.    Skin: Negative.    Allergic/Immunologic: Negative.    Neurological: Negative.    Hematological: Negative.    Psychiatric/Behavioral: Negative.      Objective:     Vital Signs (Most Recent):  Temp: 98 °F (36.7 °C) (03/18/20 0345)  Pulse: 60 (03/18/20 0345)  Resp: (!) 32 (03/18/20 0345)  BP: 127/78 (03/18/20 0345)  SpO2: 100 % (03/18/20 0345) Vital Signs (24h Range):  Temp:  [98 °F  (36.7 °C)-99.1 °F (37.3 °C)] 98 °F (36.7 °C)  Pulse:  [] 60  Resp:  [15-51] 32  SpO2:  [96 %-100 %] 100 %  BP: (105-149)/() 127/78     Weight: 47.1 kg (103 lb 13.4 oz)  Body mass index is 18.99 kg/m².    No intake or output data in the 24 hours ending 03/18/20 0447    Physical Exam   Constitutional: She is oriented to person, place, and time. She appears well-developed and well-nourished. No distress.   HENT:   Head: Normocephalic.   Eyes: Conjunctivae are normal.   Neck: Normal range of motion.   Cardiovascular: Normal rate and regular rhythm.   Pulmonary/Chest: Effort normal.   Abdominal: Soft. There is tenderness. There is guarding.   Musculoskeletal: Normal range of motion.   Neurological: She is alert and oriented to person, place, and time.   Skin: Skin is warm. She is not diaphoretic.   Psychiatric: She has a normal mood and affect.     Lower abdominal pain.  More diffuse than expected given CT findings.  Moderate with voluntary gaurding    Significant Labs:  CBC:   Recent Labs   Lab 03/18/20  0230   WBC 16.22*   RBC 3.50*   HGB 12.0   HCT 35.6*   *   *   MCH 34.3*   MCHC 33.7     CMP:   Recent Labs   Lab 03/18/20  0230   *   CALCIUM 8.3*   ALBUMIN 4.0   PROT 7.2   *   K 3.7   CO2 22*      BUN 8   CREATININE 0.7   ALKPHOS 58   ALT 9*   AST 17   BILITOT 2.0*     Lactic Acid:   Recent Labs   Lab 03/18/20  0230   LACTATE 1.6       Significant Diagnostics:  CT reviewed: Some motion artifact, looks like uncomplicated appendicitis with appendicoliths.  Pt has some pelvic free fluid, likely physiologic although complicated appendicitis is a possibility.      Assessment/Plan:     Active Diagnoses:    Diagnosis Date Noted POA    VT (ventricular tachycardia) [I47.2] 03/18/2020 Yes    Hypomagnesemia [E83.42] 03/17/2020 Yes    Hypokalemia [E87.6] 03/17/2020 Yes    Thrombocytopenia [D69.6] 03/17/2020 Yes    Abdominal pain [R10.9] 03/17/2020 Unknown    Long Q-T syndrome  [I45.81] 01/31/2013 Yes    Ventricular fibrillation [I49.01] 01/31/2013 Yes    Automatic implantable cardioverter-defibrillator in situ [Z95.810] 01/31/2013 Yes      Problems Resolved During this Admission:     38 y/o F admitted to CICU for VT that caused her AICD to go off.  Symptoms prior to ED presentation are reasonably consistent with acute appendicitis.      Pt had bedside echo, I don't see documentation of this.  If cleared from a cardiac standpoint, recommend proceeding to the OR for lap appy, possible open.  Pt is very active and young, runs frequently.  Likely does not need a formal echo but I will defer to cardiology   Case request placed for today.  Can proceed as soon as cleared, please call.  Pt needs abx coverage to cover mostly gram neg and anaerobes.  Currently on zosyn.      Thank you for your consult. I will follow-up with patient. Please contact us if you have any additional questions.    Juan A Rae MD  General Surgery  [facility]

## 2020-03-18 NOTE — ASSESSMENT & PLAN NOTE
Multiple appropriate ICD shocks (4 total) with VF termination. Etiology: hypomagnesemia and hypokalemia secondary to acute appendicitis with N/V. On Toprol XL 25 mg daily. Prior to this event, there she has never had ICD discharged.      - Continue home Toprol XL 25 mg daily  - Keep K > 4 and mag >2  - Avoid QT prolonging drugs including amiodarone     Device:  Mode: AAI <=> DDD  Lower rate 50  Upper track 130  Upper sensor 120  ZONES:  AT/AF: monitor  FVT: off  VT: off  VF (>231 bpm): ATP while charging --> 35J x 6

## 2020-03-18 NOTE — ASSESSMENT & PLAN NOTE
MDT DC ICD since 2011 with gen change 10/2018. Lower rate 50 (decreased from 55 10/2018)  - Indication: aborted SCD 6 wks post-partum in patient with LQTS

## 2020-03-18 NOTE — HPI
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 Patient was seen by Dr. Bryson 6 days ago and was doing well at that time. Device interrogated with no events (with no events to date at that time), and 99% AS-. Patient reports diffuse abd pain that started this am with general malaise and N/V. She as coming [place] house in [place] when she got shocked by her ICD. Sounds like she syncopized prior to this shock. She was shocked a total of 4 times (once in the ambulance). Only new medications she has taken is NSAIDs and mylanta.    In the ED, she was HD stable, afebrile and sating well on RA. Labs significant for K 3.2, mag 1.3, hgb 12 with plt 130 (baseline 200s), cr 0.7, TB 2.3 with normal LFTs otherwise, , tp 0.03, b-HCG negative, flu negative, procalcitonin wnl, TSH wnl 10/2019. CXR with no acute cardiopulmonary findings. ECG on arrival with AP-VS in bigeminy. Repeat ECG with QTc 600 with ST depressions and TWI in inferolateral leads. CT abd/pelvis consistent with acute appendicitis. She was transferred to [facility] for EP evaluation.     Interrogation:  VF x 4 with 3 episodes appropriately shocked (4 times total) and one aborted.    ED treatment:  -  -Metoprolol tartrate 5 mg IV x 1 --> metoprolol 25 mg PO  -Mag 3g IV x 1  -KCl 40 meq PO x 1 and 10 meq IV  -Morphine 2 mg IV x 1    Device:  Mode: AAI <=> DDD  Lower rate 50  Upper track 130  Upper sensor 120  ZONES:  AT/AF: monitor  FVT: off  VT: off  VF (>231 bpm): ATP while charging --> 35J x 6

## 2020-03-18 NOTE — PLAN OF CARE
HR 50-60's SpO2>95% on RA, -120's   No GTT:  Patient voiding spontaneously via bedpan, No BM   Patient medicated for complaints of patient with PRN Morphine.   Plan of care reviewed with patient, all questions answered.

## 2020-03-18 NOTE — SUBJECTIVE & OBJECTIVE
Past Medical History:   Diagnosis Date    Abnormal Pap smear     Abnormal Pap smear of cervix     Abnormal Pap smear of vagina     Acute blood loss anemia 8/31/2013    Defibrillator discharge     Long Q-T syndrome        Past Surgical History:   Procedure Laterality Date    breast reduction      CARDIAC PACEMAKER PLACEMENT      COLPOSCOPY      REPLACEMENT OF IMPLANTABLE CARDIOVERTER-DEFIBRILLATOR (ICD) GENERATOR N/A 9/20/2018    Procedure: REPLACEMENT, PULSE GENERATOR, ICD;  Surgeon: Flex Bryson MD;  Location: [facility] CATH LAB;  Service: Cardiology;  Laterality: N/A;  BRANDO, Dual ICD gen change, MDT, MAC, SK, 3 Prep       Review of patient's allergies indicates:   Allergen Reactions    Sulfa (sulfonamide antibiotics)        Current Facility-Administered Medications on File Prior to Encounter   Medication    0.9%  NaCl infusion    [COMPLETED] aspirin tablet 325 mg    [COMPLETED] iohexoL (OMNIPAQUE 350) injection 100 mL    [COMPLETED] magnesium sulfate 2g in water 50mL IVPB (premix)    [COMPLETED] magnesium sulfate in dextrose IVPB (premix) 1 g    [COMPLETED] metoprolol injection 5 mg    [COMPLETED] morphine injection 2 mg    [COMPLETED] morphine injection 4 mg    [COMPLETED] piperacillin-tazobactam 4.5 g in dextrose 5 % 100 mL IVPB (ready to mix system)    [COMPLETED] potassium chloride 10 mEq in 100 mL IVPB    [DISCONTINUED] amiodarone 360 mg/200 mL (1.8 mg/mL) infusion    [DISCONTINUED] amiodarone injection 150 mg    [DISCONTINUED] metoprolol tartrate (LOPRESSOR) tablet 25 mg    [DISCONTINUED] potassium chloride 10% oral solution 40 mEq     Current Outpatient Medications on File Prior to Encounter   Medication Sig    levocetirizine (XYZAL) 5 MG tablet Take 1/2 to 1 tab po qhs prn hives (Patient not taking: Reported on 3/11/2020)    metoprolol succinate (TOPROL-XL) 25 MG 24 hr tablet Take 1 tablet (25 mg total) by mouth once daily.    potassium chloride SA (K-DUR,KLOR-CON) 20 MEQ tablet Take  1 tablet (20 mEq total) by mouth once daily.    triamcinolone acetonide 0.1% (KENALOG) 0.1 % cream AAA BID x 1-2 wks then prn flares only. Avoid face, armpits, groin (Patient not taking: Reported on 3/11/2020)    VITRON-C 65 mg iron- 125 mg TbEC Take 1 tablet by mouth once daily.     Family History     Problem Relation (Age of Onset)    Breast cancer Maternal Grandmother, Maternal Aunt    Long QT syndrome Mother, Brother        Tobacco Use    Smoking status: Never Smoker    Smokeless tobacco: Never Used   Substance and Sexual Activity    Alcohol use: Yes     Comment: socially    Drug use: No    Sexual activity: Yes     Partners: Male     Birth control/protection: None, Partner-Vasectomy     Review of Systems   Constitution: Positive for chills, fever and malaise/fatigue.   Cardiovascular: Positive for chest pain. Negative for claudication, dyspnea on exertion, irregular heartbeat, leg swelling, orthopnea, palpitations and paroxysmal nocturnal dyspnea.   Respiratory: Positive for cough and shortness of breath.    Musculoskeletal: Negative for joint pain and joint swelling.   Gastrointestinal: Positive for abdominal pain, nausea and vomiting.   Neurological: Positive for light-headedness and weakness. Negative for focal weakness and headaches.   All other systems reviewed and are negative.    Objective:     Vital Signs (Most Recent):  Temp: 98 °F (36.7 °C) (03/18/20 0345)  Pulse: 60 (03/18/20 0345)  Resp: (!) 32 (03/18/20 0345)  BP: 127/78 (03/18/20 0345)  SpO2: 100 % (03/18/20 0345) Vital Signs (24h Range):  Temp:  [98 °F (36.7 °C)-99.1 °F (37.3 °C)] 98 °F (36.7 °C)  Pulse:  [] 60  Resp:  [15-51] 32  SpO2:  [96 %-100 %] 100 %  BP: (105-149)/() 127/78     Weight: 47.1 kg (103 lb 13.4 oz)  Body mass index is 18.99 kg/m².    SpO2: 100 %  O2 Device (Oxygen Therapy): room air    No intake or output data in the 24 hours ending 03/18/20 0544    Lines/Drains/Airways     Peripheral Intravenous Line                  Peripheral IV - Single Lumen 03/17/20 2042 18 G Right Forearm less than 1 day         Peripheral IV - Single Lumen 03/17/20 2204 18 G Right Antecubital less than 1 day                Physical Exam   Constitutional: She appears well-developed and well-nourished. No distress.   HENT:   Head: Normocephalic and atraumatic.   Eyes: EOM are normal. Right eye exhibits no discharge. Left eye exhibits no discharge. No scleral icterus.   Neck: Normal range of motion. Neck supple.   Cardiovascular: Normal rate, regular rhythm, S1 normal, S2 normal, normal heart sounds, intact distal pulses and normal pulses.  No extrasystoles are present. Exam reveals no gallop, no S3, no S4, no distant heart sounds, no friction rub and no opening snap.   No murmur heard.  Pulmonary/Chest: Effort normal. No respiratory distress. She has no wheezes. She has no rales.   Abdominal: Soft. Bowel sounds are normal. She exhibits no distension. There is tenderness (diffuse). There is guarding.   Musculoskeletal: Normal range of motion. She exhibits no edema or deformity.   Neurological: She is alert.   Skin: Skin is warm and dry. No rash noted. She is not diaphoretic. No erythema.   Nursing note and vitals reviewed.    Past Medical History:   Diagnosis Date    Abnormal Pap smear     Abnormal Pap smear of cervix     Abnormal Pap smear of vagina     Acute blood loss anemia 8/31/2013    Defibrillator discharge     Long Q-T syndrome        Past Surgical History:   Procedure Laterality Date    breast reduction      CARDIAC PACEMAKER PLACEMENT      COLPOSCOPY      REPLACEMENT OF IMPLANTABLE CARDIOVERTER-DEFIBRILLATOR (ICD) GENERATOR N/A 9/20/2018    Procedure: REPLACEMENT, PULSE GENERATOR, ICD;  Surgeon: Flex Bryson MD;  Location: [facility] CATH LAB;  Service: Cardiology;  Laterality: N/A;  BRANDO, Dual ICD gen change, MDT, MAC, SK, 3 Prep       Review of patient's allergies indicates:   Allergen Reactions    Sulfa (sulfonamide antibiotics)         Current Facility-Administered Medications on File Prior to Encounter   Medication    0.9%  NaCl infusion    [COMPLETED] aspirin tablet 325 mg    [COMPLETED] iohexoL (OMNIPAQUE 350) injection 100 mL    [COMPLETED] magnesium sulfate 2g in water 50mL IVPB (premix)    [COMPLETED] magnesium sulfate in dextrose IVPB (premix) 1 g    [COMPLETED] metoprolol injection 5 mg    [COMPLETED] morphine injection 2 mg    [COMPLETED] morphine injection 4 mg    [COMPLETED] piperacillin-tazobactam 4.5 g in dextrose 5 % 100 mL IVPB (ready to mix system)    [COMPLETED] potassium chloride 10 mEq in 100 mL IVPB    [DISCONTINUED] amiodarone 360 mg/200 mL (1.8 mg/mL) infusion    [DISCONTINUED] amiodarone injection 150 mg    [DISCONTINUED] metoprolol tartrate (LOPRESSOR) tablet 25 mg    [DISCONTINUED] potassium chloride 10% oral solution 40 mEq     Current Outpatient Medications on File Prior to Encounter   Medication Sig    levocetirizine (XYZAL) 5 MG tablet Take 1/2 to 1 tab po qhs prn hives (Patient not taking: Reported on 3/11/2020)    metoprolol succinate (TOPROL-XL) 25 MG 24 hr tablet Take 1 tablet (25 mg total) by mouth once daily.    potassium chloride SA (K-DUR,KLOR-CON) 20 MEQ tablet Take 1 tablet (20 mEq total) by mouth once daily.    triamcinolone acetonide 0.1% (KENALOG) 0.1 % cream AAA BID x 1-2 wks then prn flares only. Avoid face, armpits, groin (Patient not taking: Reported on 3/11/2020)    VITRON-C 65 mg iron- 125 mg TbEC Take 1 tablet by mouth once daily.     Family History     Problem Relation (Age of Onset)    Breast cancer Maternal Grandmother, Maternal Aunt    Long QT syndrome Mother, Brother        Tobacco Use    Smoking status: Never Smoker    Smokeless tobacco: Never Used   Substance and Sexual Activity    Alcohol use: Yes     Comment: socially    Drug use: No    Sexual activity: Yes     Partners: Male     Birth control/protection: None, Partner-Vasectomy     Review of Systems    Constitution: Positive for chills, fever and malaise/fatigue.   Cardiovascular: Positive for chest pain. Negative for claudication, dyspnea on exertion, irregular heartbeat, leg swelling, orthopnea, palpitations and paroxysmal nocturnal dyspnea.   Respiratory: Positive for cough and shortness of breath.    Musculoskeletal: Negative for joint pain and joint swelling.   Gastrointestinal: Positive for abdominal pain, nausea and vomiting.   Neurological: Positive for light-headedness and weakness. Negative for focal weakness and headaches.   All other systems reviewed and are negative.    Objective:     Vital Signs (Most Recent):  Temp: 98 °F (36.7 °C) (03/18/20 0345)  Pulse: 60 (03/18/20 0345)  Resp: (!) 32 (03/18/20 0345)  BP: 127/78 (03/18/20 0345)  SpO2: 100 % (03/18/20 0345) Vital Signs (24h Range):  Temp:  [98 °F (36.7 °C)-99.1 °F (37.3 °C)] 98 °F (36.7 °C)  Pulse:  [] 60  Resp:  [15-51] 32  SpO2:  [96 %-100 %] 100 %  BP: (105-149)/() 127/78     Weight: 47.1 kg (103 lb 13.4 oz)  Body mass index is 18.99 kg/m².    SpO2: 100 %  O2 Device (Oxygen Therapy): room air    No intake or output data in the 24 hours ending 03/18/20 0544    Lines/Drains/Airways     Peripheral Intravenous Line                 Peripheral IV - Single Lumen 03/17/20 2042 18 G Right Forearm less than 1 day         Peripheral IV - Single Lumen 03/17/20 2204 18 G Right Antecubital less than 1 day                Physical Exam   Constitutional: She appears well-developed and well-nourished. No distress.   HENT:   Head: Normocephalic and atraumatic.   Eyes: EOM are normal. Right eye exhibits no discharge. Left eye exhibits no discharge. No scleral icterus.   Neck: Normal range of motion. Neck supple.   Cardiovascular: Normal rate, regular rhythm, S1 normal, S2 normal, normal heart sounds, intact distal pulses and normal pulses.  No extrasystoles are present. Exam reveals no gallop, no S3, no S4, no distant heart sounds, no friction rub  and no opening snap.   No murmur heard.  Pulses:       Radial pulses are 2+ on the right side, and 2+ on the left side.        Dorsalis pedis pulses are 2+ on the right side, and 2+ on the left side.        Posterior tibial pulses are 2+ on the right side, and 2+ on the left side.   Pulmonary/Chest: Effort normal. No respiratory distress. She has no wheezes. She has no rales.   Abdominal: Soft. Bowel sounds are normal. She exhibits no distension. There is tenderness (diffuse). There is guarding.   Musculoskeletal: Normal range of motion. She exhibits no edema or deformity.   Neurological: She is alert.   Skin: Skin is warm and dry. No rash noted. She is not diaphoretic. No erythema.   Nursing note and vitals reviewed.      Significant Labs: All pertinent lab results from the last 24 hours have been reviewed.    Significant Imaging: Reviewed

## 2020-03-18 NOTE — ASSESSMENT & PLAN NOTE
Multiple appropriate ICD shocks (4 total) with VF termination. Etiology: hypomagnesemia and hypokalemia secondary to acute appendicitis with N/V. On Toprol XL 25 mg daily. Prior to this event, there she has never had ICD discharged.     - Continue metoprolol 25 mg BID for now  - EP consult  - Avoid QT prolonging drugs    Device:  Mode: AAI <=> DDD  Lower rate 50  Upper track 130  Upper sensor 120  ZONES:  AT/AF: monitor  FVT: off  VT: off  VF (>231 bpm): ATP while charging --> 35J x 6

## 2020-03-18 NOTE — PLAN OF CARE
CM at bedside to obtain discharge planning assessment.  No discharge needs noted at this time.  CM placed contact number on whiteboard.      Zunilda Beckett MD  [address] [facility] / [place] [address]      [facility] [place] [address] - [place] - [address] [place] [address]  [address] [place] [address]  [place] [address]  Phone: [phone] Fax: [phone]    [id number] - [place], [place] - [address]  [address]  [place] [address]  Phone: [phone] Fax: [phone]    [facility] - [place] - [address] [place] [address]  [address] [place] [address]  [place] [address]  Phone: [phone] Fax: [phone]    [id number] - [place], [place] - [address]  [address]  [place] [address]  Phone: [phone] Fax: [phone]    [id number] - [place], [place] - [address] AT [facility]  [address] [place] [address]  Phone: [phone] Fax: [phone]      Payor: BLUE CROSS BLUE SHIELD / Plan: [place] HMO / Product Type: HMO /     Extended Emergency Contact Information  Primary Emergency Contact: Darryl Duval  Address: [address] [place] [address] [facility]  Mobile Phone: [phone]  Relation: Spouse  Secondary Emergency Contact: Hayley Myrick  Address: [address] [place] [address] [facility]  Home Phone: [phone]  Relation: Mother    No future appointments.       03/18/20 1002   Discharge Assessment   Assessment Type Discharge Planning Assessment   Confirmed/corrected address and phone number on facesheet? Yes   Assessment information obtained from? Patient   Prior to hospitilization cognitive status: Alert/Oriented   Current cognitive status: Alert/Oriented   Current Functional Status:   (unable to assess)   Facility Arrived From: [facility]   Lives With spouse    Able to Return to Prior Arrangements yes   Is patient able to care for self after discharge? Yes   Who are your caregiver(s) and their phone number(s)? Darryl Duval (spouse)- [phone]/ Hayley Myrick (mother)- [phone]   Patient's perception of discharge disposition home or selfcare   Readmission Within the Last 30 Days other (see comments)  (transfer from [facility])   Patient currently being followed by outpatient case management? No   Equipment Currently Used at Home none   Do you have any problems affording any of your prescribed medications? No   Is the patient taking medications as prescribed? yes   Does the patient have transportation home? Yes   Transportation Anticipated family or friend will provide   Dialysis Name and Scheduled days N/A   Does the patient receive services at the [facility]madin Clinic? No   Discharge Plan A Home with family   Discharge Plan B Home   DME Needed Upon Discharge  other (see comments)  (TBD)   Patient/Family in Agreement with Plan yes       Johanna White MPH, RN, CM  Ext. [phone]

## 2020-03-18 NOTE — ASSESSMENT & PLAN NOTE
Acute onset of abdominal pain. CT c/w acute appendicitis. No evidence of perforation on CT abd pelvis.  - General surgery consulted  - Okay from cardiac standpoint to go to the OR without further cardiac workup once K > 4 and mag > 2. WOULD KEEP PADS ON PATIENT AT ALL TIMES WHEN ICD IS DISABLED.

## 2020-03-18 NOTE — ED NOTES
Patient sitting up in bed, no acute distress noted, awake, alert, and oriented x 3, calm, respirations even and unlabored, and skin is warm and dry. Patient verbalized understanding of status and plan of care. Patient denies needs at this time. Side rails up x 2, call light in reach, bed low and locked. Family at bedside. Will continue to monitor.

## 2020-03-18 NOTE — NURSING
Patient arrived to unit, no distress noted. Patient placed on bedside monitors. Vitals signs obtained. Labs collected and sent to. Cardiology and surgery resident called and notified of patient arrival. Patient complaining of abdominal pain. Patient with no skin breakdown. Foam padding placed in sacurm and heels. Patient instructed to reposition independently and waffle mattress inflated. Will continue to monitor patient.

## 2020-03-18 NOTE — ED NOTES
"Patient complains of abdominal pain x 3 days with n/v. Patient describes pain as a 'paused contraction' and feels like "shes bleeding internally." Patient also had her defibrillator go off today with runs of vtach.      Level of Consciousness: The patient is lethargic, alert, and oriented with flat affect and speech; oriented to person, place and time.  Head: Atraumatic; normocephalic.  Eyes: Conjunctivae are normal; no scleral icterus noted.  Ears/Nose/Throat: Mucous membranes pink and moist. Oropharynx clear and symmetrical.  Neck: Supple.  Appearance: Laying in bed, mild distress noted. Clothing and hygiene are clean and worn appropriately.  Skin: Skin is warm and dry with good skin turgor; intact; color consistent with ethnicity.    Musculoskeletal: Moves all extremities well in full range of motion. No obvious deformities or swelling noted.  Respiratory: Airway open and patent, respirations spontaneous, even and unlabored. No accessory muscles in use. Breath sounds clear.  Cardiac: no peripheral edema noted, good pulses palpated peripherally, capillary refill < 3 seconds.  Abdomen: Soft, no distention noted. Bowel sounds present. Patient notes tenderness to abdomen.   Neurologic: PERRLA, face exhibits symmetrical expression, hand grasps equal and even bilaterally, reports normal sensation to all extremities and face.  Psychosocial: calm, cooperative    Patient verbalized understanding of status and plan of care. Patient into hospital gown.   Patient side rails are up x 2, bed is low and locked, call light is in reach. educated on fall risk and verbalized understanding.   Cardiac monitor (alarms on, set, and audible), pulse oximeter, and automatic blood pressure cuff applied.   Will continue to monitor.      "

## 2020-03-18 NOTE — BRIEF OP NOTE
[facility]  Brief Operative Note    SUMMARY     Surgery Date: 3/18/2020     Surgeon(s) and Role:     * Hunter Alarcon MD - Primary     * Courtney Leone MD - Resident - Assisting    Pre-op Diagnosis:  Acute appendicitis with generalized peritonitis, unspecified whether abscess present, unspecified whether gangrene present, unspecified whether perforation present [K35.20]    Post-op Diagnosis:  Post-Op Diagnosis Codes:     * Acute appendicitis with generalized peritonitis, unspecified whether abscess present, unspecified whether gangrene present, unspecified whether perforation present [K35.20]    Procedure(s) (LRB):  APPENDECTOMY, LAPAROSCOPIC (N/A)    Anesthesia: General    Description of Procedure: Laparoscopic appendectomy     Description of the findings of the procedure: Appendix with significant inflammatory change suggestive of appendicitis, purulence appreciated in the pelvis, irrigated with saline until clear    Estimated Blood Loss: * No values recorded between 3/18/2020 12:28 PM and 3/18/2020  1:11 PM *         Specimens:   Specimen (12h ago, onward)    None

## 2020-03-18 NOTE — TRANSFER OF CARE
"Anesthesia Transfer of Care Note    Patient: Arianna Duval    Procedure(s) Performed: Procedure(s) (LRB):  APPENDECTOMY, LAPAROSCOPIC (N/A)    Patient location: ICU    Anesthesia Type: general    Transport from OR: Transported from OR on 6-10 L/min O2 by face mask with adequate spontaneous ventilation. Continuous ECG monitoring in transport. Continuous SpO2 monitoring in transport. Continuos invasive BP monitoring in transport    Post pain: adequate analgesia    Post assessment: no apparent anesthetic complications    Post vital signs: stable    Level of consciousness: awake    Nausea/Vomiting: no nausea/vomiting    Complications: none    Transfer of care protocol was followed      Last vitals:   Visit Vitals  /71 (BP Location: Left arm, Patient Position: Lying)   Pulse 63   Temp 36.9 °C (98.4 °F) (Oral)   Resp (!) 21   Ht 5' 2" (1.575 m)   Wt 46.7 kg (103 lb)   LMP 03/01/2020 (Within Weeks)   SpO2 99%   Breastfeeding? No   BMI 18.84 kg/m²     "

## 2020-03-18 NOTE — HPI
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 Patient was seen by Dr. Bryson 6 days ago and was doing well at that time. Device interrogated with no events (with no events to date at that time), and 99% AS-. Patient reports diffuse abd pain that started this am with general malaise and N/V. She as coming back from house in [place] when she got shocked by her ICD. Sounds like she syncopized prior to this shock. She was shocked a total of 4 times (once in the ambulance). Only new medications she has taken is NSAIDs and mylanta.     In the ED, she was HD stable, afebrile and sating well on RA. Labs significant for K 3.2, mag 1.3, hgb 12 with plt 130 (baseline 200s), cr 0.7, TB 2.3 with normal LFTs otherwise, , tp 0.03, b-HCG negative, flu negative, procalcitonin wnl, TSH wnl 10/2019. CXR with no acute cardiopulmonary findings. ECG on arrival with AP-VS in bigeminy. Repeat ECG with QTc 600 with ST depressions and TWI in inferolateral leads. CT abd/pelvis consistent with acute appendicitis. She was transferred to [facility] for EP evaluation.      Interrogation:  VF x 4 with 3 episodes appropriately shocked (4 times total) and one aborted.     ED treatment:  -  -Metoprolol tartrate 5 mg IV x 1 --> metoprolol 25 mg PO  -Mag 3g IV x 1  -KCl 40 meq PO x 1 and 10 meq IV  -Morphine 2 mg IV x 1     Device:  Mode: AAI <=> DDD  Lower rate 50  Upper track 130  Upper sensor 120  ZONES:  AT/AF: monitor  FVT: off  VT: off  VF (>231 bpm): ATP while charging --> 35J x 6

## 2020-03-18 NOTE — H&P
[facility]  Cardiology  History and Physical     Patient Name: Arianna Duval  MRN: [id number]  Admission Date: 3/18/2020  Code Status: Full Code   Attending Provider: Alli Calix MD   Primary Care Physician: Zunilda Beckett MD  Principal Problem:Ventricular fibrillation    Patient information was obtained from patient, spouse/SO and ER records.     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 Patient was seen by Dr. Bryson 6 days ago and was doing well at that time. Device interrogated with no events (with no events to date at that time), and 99% AS-. Patient reports diffuse abd pain that started this am with general malaise and N/V. She as coming back from house in [place] when she got shocked by her ICD. Sounds like she syncopized prior to this shock. She was shocked a total of 4 times (once in the ambulance). Only new medications she has taken is NSAIDs and mylanta.    In the ED, she was HD stable, afebrile and sating well on RA. Labs significant for K 3.2, mag 1.3, hgb 12 with plt 130 (baseline 200s), cr 0.7, TB 2.3 with normal LFTs otherwise, , tp 0.03, b-HCG negative, flu negative, procalcitonin wnl, TSH wnl 10/2019. CXR with no acute cardiopulmonary findings. ECG on arrival with AP-VS in [facility]. Repeat ECG with QTc 600 with ST depressions and TWI in inferolateral leads. CT abd/pelvis consistent with acute appendicitis. [place] [facility] [place]     Interrogation:  VF x 4 with 3 episodes appropriately shocked (4 times total) and one aborted.    ED treatment:  -  -Metoprolol tartrate 5 mg IV x 1 --> metoprolol 25 mg  PO  -Mag 3g IV x 1  -KCl 40 meq PO x 1 and 10 meq IV  -Morphine 2 mg IV x 1    Device:  Mode: AAI <=> DDD  Lower rate 50  Upper track 130  Upper sensor 120  ZONES:  AT/AF: monitor  FVT: off  VT: off  VF (>231 bpm): ATP while charging --> 35J x 6    Past Medical History:   Diagnosis Date    Abnormal Pap smear     Abnormal Pap smear of cervix     Abnormal Pap smear of vagina     Acute blood loss anemia 8/31/2013    Defibrillator discharge     Long Q-T syndrome        Past Surgical History:   Procedure Laterality Date    breast reduction      CARDIAC PACEMAKER PLACEMENT      COLPOSCOPY      REPLACEMENT OF IMPLANTABLE CARDIOVERTER-DEFIBRILLATOR (ICD) GENERATOR N/A 9/20/2018    Procedure: REPLACEMENT, PULSE GENERATOR, ICD;  Surgeon: Flex Bryson MD;  Location: [facility] CATH LAB;  Service: Cardiology;  Laterality: N/A;  BRANDO, Dual ICD gen change, MDT, MAC, SK, 3 Prep       Review of patient's allergies indicates:   Allergen Reactions    Sulfa (sulfonamide antibiotics)        Current Facility-Administered Medications on File Prior to Encounter   Medication    0.9%  NaCl infusion    [COMPLETED] aspirin tablet 325 mg    [COMPLETED] iohexoL (OMNIPAQUE 350) injection 100 mL    [COMPLETED] magnesium sulfate 2g in water 50mL IVPB (premix)    [COMPLETED] metoprolol injection 5 mg    metoprolol tartrate (LOPRESSOR) tablet 25 mg    [COMPLETED] morphine injection 2 mg    potassium chloride 10% oral solution 40 mEq    [DISCONTINUED] amiodarone 360 mg/200 mL (1.8 mg/mL) infusion    [DISCONTINUED] amiodarone injection 150 mg     Current Outpatient Medications on File Prior to Encounter   Medication Sig    levocetirizine (XYZAL) 5 MG tablet Take 1/2 to 1 tab po qhs prn hives (Patient not taking: Reported on 3/11/2020)    metoprolol succinate (TOPROL-XL) 25 MG 24 hr tablet Take 1 tablet (25 mg total) by mouth once daily.    potassium chloride SA (K-DUR,KLOR-CON) 20 MEQ tablet Take 1 tablet (20 mEq total) by mouth once  daily.    triamcinolone acetonide 0.1% (KENALOG) 0.1 % cream AAA BID x 1-2 wks then prn flares only. Avoid face, armpits, groin (Patient not taking: Reported on 3/11/2020)    VITRON-C 65 mg iron- 125 mg TbEC Take 1 tablet by mouth once daily.     Family History     Problem Relation (Age of Onset)    Breast cancer Maternal Grandmother, Maternal Aunt    Long QT syndrome Mother, Brother        Tobacco Use    Smoking status: Never Smoker    Smokeless tobacco: Never Used   Substance and Sexual Activity    Alcohol use: Yes     Comment: socially    Drug use: No    Sexual activity: Yes     Partners: Male     Birth control/protection: None, Partner-Vasectomy     Review of Systems   Constitution: Positive for chills, fever and malaise/fatigue.   Cardiovascular: Positive for chest pain. Negative for claudication, dyspnea on exertion, irregular heartbeat, leg swelling, orthopnea, palpitations and paroxysmal nocturnal dyspnea.   Respiratory: Positive for cough and shortness of breath.    Musculoskeletal: Negative for joint pain and joint swelling.   Gastrointestinal: Positive for abdominal pain, nausea and vomiting.   Neurological: Positive for light-headedness and weakness. Negative for focal weakness and headaches.   All other systems reviewed and are negative.    Objective:     Vital Signs (Most Recent):    Vital Signs (24h Range):  Temp:  [99.1 °F (37.3 °C)] 99.1 °F (37.3 °C)  Pulse:  [] 82  Resp:  [18-26] 19  SpO2:  [96 %-100 %] 99 %  BP: (119-149)/() 127/70        There is no height or weight on file to calculate BMI.            No intake or output data in the 24 hours ending 03/17/20 2331    Lines/Drains/Airways     Peripheral Intravenous Line                 Peripheral IV - Single Lumen 03/17/20 2042 18 G Right Forearm less than 1 day         Peripheral IV - Single Lumen 03/17/20 2204 18 G Right Antecubital less than 1 day                Physical Exam   Constitutional: She appears well-developed and  well-nourished. No distress.   HENT:   Head: Normocephalic and atraumatic.   Eyes: EOM are normal. Right eye exhibits no discharge. Left eye exhibits no discharge. No scleral icterus.   Neck: Normal range of motion. Neck supple.   Cardiovascular: Normal rate, regular rhythm, S1 normal, S2 normal, normal heart sounds, intact distal pulses and normal pulses.  No extrasystoles are present. Exam reveals no gallop, no S3, no S4, no distant heart sounds, no friction rub and no opening snap.   No murmur heard.  Pulses:       Radial pulses are 2+ on the right side, and 2+ on the left side.        Dorsalis pedis pulses are 2+ on the right side, and 2+ on the left side.        Posterior tibial pulses are 2+ on the right side, and 2+ on the left side.   Pulmonary/Chest: Effort normal. No respiratory distress. She has no wheezes. She has no rales.   Abdominal: Soft. Bowel sounds are normal. She exhibits no distension. There is tenderness (diffuse). There is guarding.   Musculoskeletal: Normal range of motion. She exhibits no edema or deformity.   Neurological: She is alert.   Skin: Skin is warm and dry. No rash noted. She is not diaphoretic. No erythema.   Nursing note and vitals reviewed.      Significant Labs: All pertinent lab results from the last 24 hours have been reviewed.    Significant Imaging: Reviewed    Assessment and Plan:     * Ventricular fibrillation  Multiple appropriate ICD shocks (4 total) with VF termination. Etiology: hypomagnesemia and hypokalemia secondary to acute appendicitis with N/V. On Toprol XL 25 mg daily. Prior to this event, there she has never had ICD discharged.     - Continue metoprolol 25 mg BID for now  - EP consult  - Avoid QT prolonging drugs  - Routine echo. Does not need to be performed prior to surgery    Device:  Mode: AAI <=> DDD  Lower rate 50  Upper track 130  Upper sensor 120  ZONES:  AT/AF: monitor  FVT: off  VT: off  VF (>231 bpm): ATP while charging --> 35J x 6    Acute  appendicitis  Acute onset of abdominal pain. CT c/w acute appendicitis. No evidence of perforation on CT abd pelvis.  - General surgery consulted  - Okay from cardiac standpoint to go to the OR without further cardiac workup once K > 4 and mag > 2. Bedside echo with normal cardiac function. Patient does not need further cardiac imaging to undergo surgery as TTE not indicated as routine preop evaluation especially in urgent surgeries, and in patients with no clinical evidence of CHF and great exercise capacity. WOULD KEEP PADS ON PATIENT AT ALL TIMES WHEN ICD IS DISABLED.    Thrombocytopenia  130 on admit. Hgb 13.  -Monitor    Hypokalemia  3.2 on arrival  - Aggressive K repletion to >4  - Close monitoring    Hypomagnesemia  Mg 1.3 on arrival  - Aggressive magnesium repletion to >2  - Closely monitor    Automatic implantable cardioverter-defibrillator in situ  MDT DC ICD since 2011 with gen change 10/2018. 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 Bryson. Recently seen in clinic on 3/11/2020 with no issues at that time. Device interrogation with no events. On Toprol XL 25 mg daily. ICD in place since 2011 with generator change 10/2018.        VTE Risk Mitigation (From admission, onward)         Ordered     IP VTE LOW RISK PATIENT  Once      03/18/20 0226     Place sequential compression device  Until discontinued      03/18/20 0226                Chris Salmeron MD  Cardiology   [facility]

## 2020-03-18 NOTE — CONSULTS
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 Patient was seen by Dr. Bryson 6 days ago and was doing well at that time. Device interrogated with no events (with no events to date at that time), and 99% AS-. Patient reports diffuse abd pain that started this am with general malaise and N/V. She as coming back from house in [place] when she got shocked by her ICD. Sounds like she syncopized prior to this shock. She was shocked a total of 4 times (once in the ambulance). Only new medications she has taken is NSAIDs and mylanta.     In the ED, she was HD stable, afebrile and sating well on RA. Labs significant for K 3.2, mag 1.3, hgb 12 with plt 130 (baseline 200s), cr 0.7, TB 2.3 with normal LFTs otherwise, , tp 0.03, b-HCG negative, flu negative, procalcitonin wnl, TSH wnl 10/2019. CXR with no acute cardiopulmonary findings. ECG on arrival with AP-VS [place]. Repeat ECG with QTc 600 with ST depressions and TWI in inferolateral leads. CT abd/pelvis consistent with acute appendicitis. [place] [facility] [place]      Interrogation:  VF x 4 with 3 episodes appropriately shocked (4 times total) and one aborted.     ED treatment:  -  -Metoprolol tartrate 5 mg IV x 1 -->  metoprolol 25 mg PO  -Mag 3g IV x 1  -KCl 40 meq PO x 1 and 10 meq IV  -Morphine 2 mg IV x 1     Device:  Mode: AAI <=> DDD  Lower rate 50  Upper track 130  Upper sensor 120  ZONES:  AT/AF: monitor  FVT: off  VT: off  VF (>231 bpm): ATP while charging --> 35J x 6    Past Medical History:   Diagnosis Date    Abnormal Pap smear     Abnormal Pap smear of cervix     Abnormal Pap smear of vagina     Acute blood loss anemia 8/31/2013    Defibrillator discharge     Long Q-T syndrome        Past Surgical History:   Procedure Laterality Date    breast reduction      CARDIAC PACEMAKER PLACEMENT      COLPOSCOPY      REPLACEMENT OF IMPLANTABLE CARDIOVERTER-DEFIBRILLATOR (ICD) GENERATOR N/A 9/20/2018    Procedure: REPLACEMENT, PULSE GENERATOR, ICD;  Surgeon: Flex Bryson MD;  Location: [facility] CATH LAB;  Service: Cardiology;  Laterality: N/A;  BRANDO, Dual ICD gen change, MDT, MAC, SK, 3 Prep       Review of patient's allergies indicates:   Allergen Reactions    Sulfa (sulfonamide antibiotics)        Current Facility-Administered Medications on File Prior to Encounter   Medication    0.9%  NaCl infusion    [COMPLETED] aspirin tablet 325 mg    [COMPLETED] iohexoL (OMNIPAQUE 350) injection 100 mL    [COMPLETED] magnesium sulfate 2g in water 50mL IVPB (premix)    [COMPLETED] magnesium sulfate in dextrose IVPB (premix) 1 g    [COMPLETED] metoprolol injection 5 mg    [COMPLETED] morphine injection 2 mg    [COMPLETED] morphine injection 4 mg    [COMPLETED] piperacillin-tazobactam 4.5 g in dextrose 5 % 100 mL IVPB (ready to mix system)    [COMPLETED] potassium chloride 10 mEq in 100 mL IVPB    [DISCONTINUED] amiodarone 360 mg/200 mL (1.8 mg/mL) infusion    [DISCONTINUED] amiodarone injection 150 mg    [DISCONTINUED] metoprolol tartrate (LOPRESSOR) tablet 25 mg    [DISCONTINUED] potassium chloride 10% oral solution 40 mEq     Current Outpatient Medications on File Prior to Encounter   Medication Sig     levocetirizine (XYZAL) 5 MG tablet Take 1/2 to 1 tab po qhs prn hives (Patient not taking: Reported on 3/11/2020)    metoprolol succinate (TOPROL-XL) 25 MG 24 hr tablet Take 1 tablet (25 mg total) by mouth once daily.    potassium chloride SA (K-DUR,KLOR-CON) 20 MEQ tablet Take 1 tablet (20 mEq total) by mouth once daily.    triamcinolone acetonide 0.1% (KENALOG) 0.1 % cream AAA BID x 1-2 wks then prn flares only. Avoid face, armpits, groin (Patient not taking: Reported on 3/11/2020)    VITRON-C 65 mg iron- 125 mg TbEC Take 1 tablet by mouth once daily.     Family History     Problem Relation (Age of Onset)    Breast cancer Maternal Grandmother, Maternal Aunt    Long QT syndrome Mother, Brother        Tobacco Use    Smoking status: Never Smoker    Smokeless tobacco: Never Used   Substance and Sexual Activity    Alcohol use: Yes     Comment: socially    Drug use: No    Sexual activity: Yes     Partners: Male     Birth control/protection: None, Partner-Vasectomy     Review of Systems   Constitution: Positive for chills, fever and malaise/fatigue.   Cardiovascular: Positive for chest pain. Negative for claudication, dyspnea on exertion, irregular heartbeat, leg swelling, orthopnea, palpitations and paroxysmal nocturnal dyspnea.   Respiratory: Positive for cough and shortness of breath.    Musculoskeletal: Negative for joint pain and joint swelling.   Gastrointestinal: Positive for abdominal pain, nausea and vomiting.   Neurological: Positive for light-headedness and weakness. Negative for focal weakness and headaches.   All other systems reviewed and are negative.    Objective:     Vital Signs (Most Recent):  Temp: 98 °F (36.7 °C) (03/18/20 0345)  Pulse: 60 (03/18/20 0345)  Resp: (!) 32 (03/18/20 0345)  BP: 127/78 (03/18/20 0345)  SpO2: 100 % (03/18/20 0345) Vital Signs (24h Range):  Temp:  [98 °F (36.7 °C)-99.1 °F (37.3 °C)] 98 °F (36.7 °C)  Pulse:  [] 60  Resp:  [15-51] 32  SpO2:  [96 %-100 %] 100  %  BP: (105-149)/() 127/78     Weight: 47.1 kg (103 lb 13.4 oz)  Body mass index is 18.99 kg/m².    SpO2: 100 %  O2 Device (Oxygen Therapy): room air    No intake or output data in the 24 hours ending 03/18/20 0544    Lines/Drains/Airways     Peripheral Intravenous Line                 Peripheral IV - Single Lumen 03/17/20 2042 18 G Right Forearm less than 1 day         Peripheral IV - Single Lumen 03/17/20 2204 18 G Right Antecubital less than 1 day                Physical Exam   Constitutional: She appears well-developed and well-nourished. No distress.   HENT:   Head: Normocephalic and atraumatic.   Eyes: EOM are normal. Right eye exhibits no discharge. Left eye exhibits no discharge. No scleral icterus.   Neck: Normal range of motion. Neck supple.   Cardiovascular: Normal rate, regular rhythm, S1 normal, S2 normal, normal heart sounds, intact distal pulses and normal pulses.  No extrasystoles are present. Exam reveals no gallop, no S3, no S4, no distant heart sounds, no friction rub and no opening snap.   No murmur heard.  Pulmonary/Chest: Effort normal. No respiratory distress. She has no wheezes. She has no rales.   Abdominal: Soft. Bowel sounds are normal. She exhibits no distension. There is tenderness (diffuse). There is guarding.   Musculoskeletal: Normal range of motion. She exhibits no edema or deformity.   Neurological: She is alert.   Skin: Skin is warm and dry. No rash noted. She is not diaphoretic. No erythema.   Nursing note and vitals reviewed.    Past Medical History:   Diagnosis Date    Abnormal Pap smear     Abnormal Pap smear of cervix     Abnormal Pap smear of vagina     Acute blood loss anemia 8/31/2013    Defibrillator discharge     Long Q-T syndrome        Past Surgical History:   Procedure Laterality Date    breast reduction      CARDIAC PACEMAKER PLACEMENT      COLPOSCOPY      REPLACEMENT OF IMPLANTABLE CARDIOVERTER-DEFIBRILLATOR (ICD) GENERATOR N/A 9/20/2018    Procedure:  REPLACEMENT, PULSE GENERATOR, ICD;  Surgeon: Flex Bryson MD;  Location: [facility] CATH LAB;  Service: Cardiology;  Laterality: N/A;  BRANDO, Dual ICD gen change, MDT, MAC, SK, 3 Prep       Review of patient's allergies indicates:   Allergen Reactions    Sulfa (sulfonamide antibiotics)        Current Facility-Administered Medications on File Prior to Encounter   Medication    0.9%  NaCl infusion    [COMPLETED] aspirin tablet 325 mg    [COMPLETED] iohexoL (OMNIPAQUE 350) injection 100 mL    [COMPLETED] magnesium sulfate 2g in water 50mL IVPB (premix)    [COMPLETED] magnesium sulfate in dextrose IVPB (premix) 1 g    [COMPLETED] metoprolol injection 5 mg    [COMPLETED] morphine injection 2 mg    [COMPLETED] morphine injection 4 mg    [COMPLETED] piperacillin-tazobactam 4.5 g in dextrose 5 % 100 mL IVPB (ready to mix system)    [COMPLETED] potassium chloride 10 mEq in 100 mL IVPB    [DISCONTINUED] amiodarone 360 mg/200 mL (1.8 mg/mL) infusion    [DISCONTINUED] amiodarone injection 150 mg    [DISCONTINUED] metoprolol tartrate (LOPRESSOR) tablet 25 mg    [DISCONTINUED] potassium chloride 10% oral solution 40 mEq     Current Outpatient Medications on File Prior to Encounter   Medication Sig    levocetirizine (XYZAL) 5 MG tablet Take 1/2 to 1 tab po qhs prn hives (Patient not taking: Reported on 3/11/2020)    metoprolol succinate (TOPROL-XL) 25 MG 24 hr tablet Take 1 tablet (25 mg total) by mouth once daily.    potassium chloride SA (K-DUR,KLOR-CON) 20 MEQ tablet Take 1 tablet (20 mEq total) by mouth once daily.    triamcinolone acetonide 0.1% (KENALOG) 0.1 % cream AAA BID x 1-2 wks then prn flares only. Avoid face, armpits, groin (Patient not taking: Reported on 3/11/2020)    VITRON-C 65 mg iron- 125 mg TbEC Take 1 tablet by mouth once daily.     Family History     Problem Relation (Age of Onset)    Breast cancer Maternal Grandmother, Maternal Aunt    Long QT syndrome Mother, Brother        Tobacco Use     Smoking status: Never Smoker    Smokeless tobacco: Never Used   Substance and Sexual Activity    Alcohol use: Yes     Comment: socially    Drug use: No    Sexual activity: Yes     Partners: Male     Birth control/protection: None, Partner-Vasectomy     Review of Systems   Constitution: Positive for chills, fever and malaise/fatigue.   Cardiovascular: Positive for chest pain. Negative for claudication, dyspnea on exertion, irregular heartbeat, leg swelling, orthopnea, palpitations and paroxysmal nocturnal dyspnea.   Respiratory: Positive for cough and shortness of breath.    Musculoskeletal: Negative for joint pain and joint swelling.   Gastrointestinal: Positive for abdominal pain, nausea and vomiting.   Neurological: Positive for light-headedness and weakness. Negative for focal weakness and headaches.   All other systems reviewed and are negative.    Objective:     Vital Signs (Most Recent):  Temp: 98 °F (36.7 °C) (03/18/20 0345)  Pulse: 60 (03/18/20 0345)  Resp: (!) 32 (03/18/20 0345)  BP: 127/78 (03/18/20 0345)  SpO2: 100 % (03/18/20 0345) Vital Signs (24h Range):  Temp:  [98 °F (36.7 °C)-99.1 °F (37.3 °C)] 98 °F (36.7 °C)  Pulse:  [] 60  Resp:  [15-51] 32  SpO2:  [96 %-100 %] 100 %  BP: (105-149)/() 127/78     Weight: 47.1 kg (103 lb 13.4 oz)  Body mass index is 18.99 kg/m².    SpO2: 100 %  O2 Device (Oxygen Therapy): room air    No intake or output data in the 24 hours ending 03/18/20 0544    Lines/Drains/Airways     Peripheral Intravenous Line                 Peripheral IV - Single Lumen 03/17/20 2042 18 G Right Forearm less than 1 day         Peripheral IV - Single Lumen 03/17/20 2204 18 G Right Antecubital less than 1 day                Physical Exam   Constitutional: She appears well-developed and well-nourished. No distress.   HENT:   Head: Normocephalic and atraumatic.   Eyes: EOM are normal. Right eye exhibits no discharge. Left eye exhibits no discharge. No scleral icterus.   Neck:  Normal range of motion. Neck supple.   Cardiovascular: Normal rate, regular rhythm, S1 normal, S2 normal, normal heart sounds, intact distal pulses and normal pulses.  No extrasystoles are present. Exam reveals no gallop, no S3, no S4, no distant heart sounds, no friction rub and no opening snap.   No murmur heard.  Pulses:       Radial pulses are 2+ on the right side, and 2+ on the left side.        Dorsalis pedis pulses are 2+ on the right side, and 2+ on the left side.        Posterior tibial pulses are 2+ on the right side, and 2+ on the left side.   Pulmonary/Chest: Effort normal. No respiratory distress. She has no wheezes. She has no rales.   Abdominal: Soft. Bowel sounds are normal. She exhibits no distension. There is tenderness (diffuse). There is guarding.   Musculoskeletal: Normal range of motion. She exhibits no edema or deformity.   Neurological: She is alert.   Skin: Skin is warm and dry. No rash noted. She is not diaphoretic. No erythema.   Nursing note and vitals reviewed.      Significant Labs: All pertinent lab results from the last 24 hours have been reviewed.    Significant Imaging: Reviewed              Assessment and Plan:     * Ventricular fibrillation  Multiple appropriate ICD shocks (4 total) with VF termination. Etiology: hypomagnesemia and hypokalemia secondary to acute appendicitis with N/V. On Toprol XL 25 mg daily. Prior to this event, there she has never had ICD discharged.      - Continue home Toprol XL 25 mg daily  - Keep K > 4 and mag >2  - Avoid QT prolonging drugs including amiodarone     Device:  Mode: AAI <=> DDD  Lower rate 50  Upper track 130  Upper sensor 120  ZONES:  AT/AF: monitor  FVT: off  VT: off  VF (>231 bpm): ATP while charging --> 35J x 6    Automatic implantable cardioverter-defibrillator in situ  MDT DC ICD since 2011 with gen change 10/2018. 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 Bryson. Recently seen in clinic on 3/11/2020 with no issues at that time. Device interrogation with no events. On Toprol XL 25 mg daily. ICD in place since 2011 with generator change 10/2018.        Thank you for your consult. I will follow-up with patient. Please contact us if you have any additional questions.    Chris Salmeron MD  Cardiac Electrophysiology  [facility]

## 2020-03-18 NOTE — PROGRESS NOTES
Pt transferred to CSU room 350. Pt transferred by wheelchair on telemetry with significant other. Pt in bed with call light in reach. Report given to VICKI Garvin and RN at bedside. Chart with Pt. Will continue to monitor.

## 2020-03-18 NOTE — ANESTHESIA PROCEDURE NOTES
Intubation  Performed by: Sarah Rhodes CRNA  Authorized by: Elsa Boyle MD     Intubation:     Induction:  Intravenous    Intubated:  Postinduction    Mask Ventilation:  Easy mask    Attempts:  1    Attempted By:  CRNA    Method of Intubation:  Direct    Blade:  Yanes 2    Laryngeal View Grade: Grade I - full view of chords      Difficult Airway Encountered?: No      Complications:  None    Airway Device:  Oral endotracheal tube    Airway Device Size:  7.0    Style/Cuff Inflation:  Cuffed (inflated to minimal occlusive pressure)    Inflation Amount (mL):  6    Tube secured:  23    Secured at:  The lips    Placement Verified By:  Capnometry    Complicating Factors:  None    Findings Post-Intubation:  BS equal bilateral

## 2020-03-18 NOTE — PROGRESS NOTES
Pt arrived to SICU [id number] connected to continuous monitoring and 6L Face mask. VSS. Family at bedside. Will continue to monitor.

## 2020-03-18 NOTE — SUBJECTIVE & OBJECTIVE
Past Medical History:   Diagnosis Date    Abnormal Pap smear     Abnormal Pap smear of cervix     Abnormal Pap smear of vagina     Acute blood loss anemia 8/31/2013    Defibrillator discharge     Long Q-T syndrome        Past Surgical History:   Procedure Laterality Date    breast reduction      CARDIAC PACEMAKER PLACEMENT      COLPOSCOPY      REPLACEMENT OF IMPLANTABLE CARDIOVERTER-DEFIBRILLATOR (ICD) GENERATOR N/A 9/20/2018    Procedure: REPLACEMENT, PULSE GENERATOR, ICD;  Surgeon: Flex Bryson MD;  Location: [facility] CATH LAB;  Service: Cardiology;  Laterality: N/A;  BRANDO, Dual ICD gen change, MDT, MAC, SK, 3 Prep       Review of patient's allergies indicates:   Allergen Reactions    Sulfa (sulfonamide antibiotics)        Current Facility-Administered Medications on File Prior to Encounter   Medication    0.9%  NaCl infusion    [COMPLETED] aspirin tablet 325 mg    [COMPLETED] iohexoL (OMNIPAQUE 350) injection 100 mL    [COMPLETED] magnesium sulfate 2g in water 50mL IVPB (premix)    [COMPLETED] metoprolol injection 5 mg    metoprolol tartrate (LOPRESSOR) tablet 25 mg    [COMPLETED] morphine injection 2 mg    potassium chloride 10% oral solution 40 mEq    [DISCONTINUED] amiodarone 360 mg/200 mL (1.8 mg/mL) infusion    [DISCONTINUED] amiodarone injection 150 mg     Current Outpatient Medications on File Prior to Encounter   Medication Sig    levocetirizine (XYZAL) 5 MG tablet Take 1/2 to 1 tab po qhs prn hives (Patient not taking: Reported on 3/11/2020)    metoprolol succinate (TOPROL-XL) 25 MG 24 hr tablet Take 1 tablet (25 mg total) by mouth once daily.    potassium chloride SA (K-DUR,KLOR-CON) 20 MEQ tablet Take 1 tablet (20 mEq total) by mouth once daily.    triamcinolone acetonide 0.1% (KENALOG) 0.1 % cream AAA BID x 1-2 wks then prn flares only. Avoid face, armpits, groin (Patient not taking: Reported on 3/11/2020)    VITRON-C 65 mg iron- 125 mg TbEC Take 1 tablet by mouth once daily.      Family History     Problem Relation (Age of Onset)    Breast cancer Maternal Grandmother, Maternal Aunt    Long QT syndrome Mother, Brother        Tobacco Use    Smoking status: Never Smoker    Smokeless tobacco: Never Used   Substance and Sexual Activity    Alcohol use: Yes     Comment: socially    Drug use: No    Sexual activity: Yes     Partners: Male     Birth control/protection: None, Partner-Vasectomy     Review of Systems   Constitution: Positive for chills, fever and malaise/fatigue.   Cardiovascular: Positive for chest pain. Negative for claudication, dyspnea on exertion, irregular heartbeat, leg swelling, orthopnea, palpitations and paroxysmal nocturnal dyspnea.   Respiratory: Positive for cough and shortness of breath.    Musculoskeletal: Negative for joint pain and joint swelling.   Gastrointestinal: Positive for abdominal pain, nausea and vomiting.   Neurological: Positive for light-headedness and weakness. Negative for focal weakness and headaches.   All other systems reviewed and are negative.    Objective:     Vital Signs (Most Recent):    Vital Signs (24h Range):  Temp:  [99.1 °F (37.3 °C)] 99.1 °F (37.3 °C)  Pulse:  [] 82  Resp:  [18-26] 19  SpO2:  [96 %-100 %] 99 %  BP: (119-149)/() 127/70        There is no height or weight on file to calculate BMI.            No intake or output data in the 24 hours ending 03/17/20 2331    Lines/Drains/Airways     Peripheral Intravenous Line                 Peripheral IV - Single Lumen 03/17/20 2042 18 G Right Forearm less than 1 day         Peripheral IV - Single Lumen 03/17/20 2204 18 G Right Antecubital less than 1 day                Physical Exam   Constitutional: She appears well-developed and well-nourished. No distress.   HENT:   Head: Normocephalic and atraumatic.   Eyes: EOM are normal. Right eye exhibits no discharge. Left eye exhibits no discharge. No scleral icterus.   Neck: Normal range of motion. Neck supple.   Cardiovascular:  Normal rate, regular rhythm, S1 normal, S2 normal, normal heart sounds, intact distal pulses and normal pulses.  No extrasystoles are present. Exam reveals no gallop, no S3, no S4, no distant heart sounds, no friction rub and no opening snap.   No murmur heard.  Pulses:       Radial pulses are 2+ on the right side, and 2+ on the left side.        Dorsalis pedis pulses are 2+ on the right side, and 2+ on the left side.        Posterior tibial pulses are 2+ on the right side, and 2+ on the left side.   Pulmonary/Chest: Effort normal. No respiratory distress. She has no wheezes. She has no rales.   Abdominal: Soft. Bowel sounds are normal. She exhibits no distension. There is tenderness (diffuse). There is guarding.   Musculoskeletal: Normal range of motion. She exhibits no edema or deformity.   Neurological: She is alert.   Skin: Skin is warm and dry. No rash noted. She is not diaphoretic. No erythema.   Nursing note and vitals reviewed.      Significant Labs: All pertinent lab results from the last 24 hours have been reviewed.    Significant Imaging: Reviewed

## 2020-03-18 NOTE — ANESTHESIA PREPROCEDURE EVALUATION
[facility]  Anesthesia Pre-Operative Evaluation         Patient Name: Arianna Duval  YOB: 1980  MRN: [id number]    SUBJECTIVE:     Pre-operative evaluation for Procedure(s) (LRB):  APPENDECTOMY, LAPAROSCOPIC (N/A)     03/18/2020    Arianna Duval is a 39 y.o. female w/ a significant PMHx of long qt syndrome, AICD placement for it.    Pt started having abdominal pain that started yesterday morning.  Pain gradually increased and pt started having N/V.  She was on the way to the hospital when she started feeling acutely tired, and her AICD went off.  Pt was transferred to [facility] for a higher level of care.      Echo Pending.    Patient now presents for the above procedure(s).      LDA: None documented.       Peripheral IV - Single Lumen 03/17/20 2042 18 G Right Forearm (Active)   Site Assessment Clean;Dry;Intact 3/18/2020  3:01 AM   Line Status Flushed;Infusing 3/18/2020  3:01 AM   Dressing Status Clean;Dry;Intact 3/18/2020  3:01 AM   Dressing Intervention Sterile dressing change 3/18/2020  3:01 AM   Dressing Change Due 03/22/20 3/18/2020  3:01 AM   Site Change Due 03/21/20 3/18/2020  3:01 AM   Reason Not Rotated Not due 3/18/2020  3:01 AM   Number of days: 0            Peripheral IV - Single Lumen 03/17/20 2204 18 G Right Antecubital (Active)   Site Assessment Clean;Dry;Intact 3/18/2020  3:01 AM   Line Status Infusing;Flushed 3/18/2020  3:01 AM   Dressing Status Clean;Dry;Intact 3/18/2020  3:01 AM   Dressing Intervention Sterile dressing change 3/18/2020  3:01 AM   Dressing Change Due 03/22/20 3/18/2020  3:01 AM   Site Change Due 03/21/20 3/18/2020  3:01 AM   Reason Not Rotated Not due 3/18/2020  3:01 AM   Number of days: 0       Prev airway: None documented.    Drips: None documented.      Patient Active Problem List   Diagnosis    Long Q-T syndrome    Ventricular fibrillation    Automatic implantable cardioverter-defibrillator in situ    Prolonged QT interval syndrome     Spontaneous vaginal delivery    Myopia    Corneal scar, right eye    Hypomagnesemia    Hypokalemia    Thrombocytopenia    Abdominal pain    VT (ventricular tachycardia)       Review of patient's allergies indicates:   Allergen Reactions    Sulfa (sulfonamide antibiotics)        Current Inpatient Medications:   metoprolol tartrate  25 mg Oral BID    piperacillin-tazobactam (ZOSYN) IVPB  4.5 g Intravenous Q8H       Current Facility-Administered Medications on File Prior to Encounter   Medication Dose Route Frequency Provider Last Rate Last Dose    0.9%  NaCl infusion   Intravenous Continuous Page Cary NP   Stopped at 09/20/18 1052     Current Outpatient Medications on File Prior to Encounter   Medication Sig Dispense Refill    levocetirizine (XYZAL) 5 MG tablet Take 1/2 to 1 tab po qhs prn hives (Patient not taking: Reported on 3/11/2020) 30 tablet 2    metoprolol succinate (TOPROL-XL) 25 MG 24 hr tablet Take 1 tablet (25 mg total) by mouth once daily. 90 tablet 3    potassium chloride SA (K-DUR,KLOR-CON) 20 MEQ tablet Take 1 tablet (20 mEq total) by mouth once daily. 90 tablet 3    triamcinolone acetonide 0.1% (KENALOG) 0.1 % cream AAA BID x 1-2 wks then prn flares only. Avoid face, armpits, groin (Patient not taking: Reported on 3/11/2020) 454 g 0    VITRON-C 65 mg iron- 125 mg TbEC Take 1 tablet by mouth once daily.         Past Surgical History:   Procedure Laterality Date    breast reduction      CARDIAC PACEMAKER PLACEMENT      COLPOSCOPY      REPLACEMENT OF IMPLANTABLE CARDIOVERTER-DEFIBRILLATOR (ICD) GENERATOR N/A 9/20/2018    Procedure: REPLACEMENT, PULSE GENERATOR, ICD;  Surgeon: Flex Bryson MD;  Location: [facility] CATH LAB;  Service: Cardiology;  Laterality: N/A;  BRANDO, Dual ICD gen change, MDT, MAC, SK, 3 Prep       Social History     Socioeconomic History    Marital status:      Spouse name: Not on file    Number of children: Not on file    Years of education: Not on  file    Highest education level: Not on file   Occupational History    Occupation: homemaker   Social Needs    Financial resource strain: Not on file    Food insecurity:     Worry: Not on file     Inability: Not on file    Transportation needs:     Medical: Not on file     Non-medical: Not on file   Tobacco Use    Smoking status: Never Smoker    Smokeless tobacco: Never Used   Substance and Sexual Activity    Alcohol use: Yes     Comment: socially    Drug use: No    Sexual activity: Yes     Partners: Male     Birth control/protection: None, Partner-Vasectomy   Lifestyle    Physical activity:     Days per week: Not on file     Minutes per session: Not on file    Stress: Not on file   Relationships    Social connections:     Talks on phone: Not on file     Gets together: Not on file     Attends [employer] service: Not on file     Active member of club or organization: Not on file     Attends meetings of clubs or organizations: Not on file     Relationship status: Not on file   Other Topics Concern    Are you pregnant or think you may be? No    Breast-feeding No   Social History Narrative    Not on file       OBJECTIVE:     Vital Signs Range (Last 24H):  Temp:  [36.7 °C (98 °F)-37.3 °C (99.1 °F)]   Pulse:  []   Resp:  [15-51]   BP: (105-149)/()   SpO2:  [96 %-100 %]       Significant Labs:  Lab Results   Component Value Date    WBC 16.22 (H) 03/18/2020    HGB 12.0 03/18/2020    HCT 35.6 (L) 03/18/2020     (L) 03/18/2020    CHOL 209 (H) 10/07/2019    TRIG 69 10/07/2019    HDL 71 10/07/2019    ALT 9 (L) 03/18/2020    AST 17 03/18/2020     (L) 03/18/2020    K 3.7 03/18/2020     03/18/2020    CREATININE 0.7 03/18/2020    BUN 8 03/18/2020    CO2 22 (L) 03/18/2020    TSH 1.086 10/07/2019    INR 1.0 03/18/2020    HGBA1C 4.8 03/18/2020       Diagnostic Studies: No relevant studies.    EKG:   Results for orders placed or performed during the hospital encounter of 03/17/20   EKG  12-lead    Collection Time: 03/18/20  2:52 AM    Narrative    Test Reason : R07.9,    Vent. Rate : 074 BPM     Atrial Rate : 074 BPM     P-R Int : 132 ms          QRS Dur : 078 ms      QT Int : 608 ms       P-R-T Axes : 069 065 076 degrees     QTc Int : 674 ms    Normal sinus rhythm  Possible Left atrial enlargement  Nonspecific T wave abnormality  Abnormal ECG  When compared with ECG of 17-MAR-2020 20:55,  Non-specific change in ST segment in Inferior leads  Nonspecific T wave abnormality no longer evident in Inferior leads  T wave inversion no longer evident in Lateral leads  QT has lengthened    Referred By: [email or web address]   SELF           Confirmed By:        2D ECHO:  TTE:  Echo results pending. Plan to perform Echo today.    SANIA:  No results found for this or any previous visit.    ASSESSMENT/PLAN:                                                                                                                 03/18/2020  Arianna Duval is a 39 y.o., female.    Anesthesia Evaluation    I have reviewed the Patient Summary Reports.     I have reviewed the Medications.     Review of Systems  Anesthesia Hx:  No problems with previous Anesthesia Denies Hx of Anesthetic complications  History of prior surgery of interest to airway management or planning: Denies Family Hx of Anesthesia complications.   Denies Personal Hx of Anesthesia complications.   Social:  Non-Smoker    EENT/Dental:   Denies Otitis Media   Cardiovascular:   Denies Hypertension. Dysrhythmias  H/o familial long QT syndrome diagnosed after cardiac arrest following her last pregnancy,  AICD in place since then (6/2011).      Feels well.  Denies CP/SOB.  Active, able to exercise. Has never been shocked by AICD.    Pulmonary:  Pulmonary Normal  Denies COPD.  Denies Asthma.  Denies Shortness of breath.    Renal/:   Denies Chronic Renal Disease.     Hepatic/GI:   Denies PUD.    Musculoskeletal:  Musculoskeletal Normal    Neurological:  Neurology Normal     Endocrine:  Endocrine Normal        Physical Exam  General:  Well nourished    Airway/Jaw/Neck:  Airway Findings: Mouth Opening: Normal Tongue: Normal  General Airway Assessment: Adult  Mallampati: II  Improves to II with phonation.  TM Distance: Normal, at least 6 cm      Dental:  Dental Findings: In tact   Chest/Lungs:  Chest/Lungs Findings: Normal Respiratory Rate     Heart/Vascular:  Heart Findings: Rate: Normal  Rhythm: Regular Rhythm  Sounds: Normal        Mental Status:  Mental Status Findings:  Cooperative, Alert and Oriented         Anesthesia Plan  Type of Anesthesia, risks & benefits discussed:  Anesthesia Type:  general  Patient's Preference:   Intra-op Monitoring Plan: standard ASA monitors  Intra-op Monitoring Plan Comments:   Post Op Pain Control Plan: per primary service following discharge from PACU and multimodal analgesia  Post Op Pain Control Plan Comments: Per primary service.     Induction:   IV  Beta Blocker:  Patient is not currently on a Beta-Blocker (No further documentation required).       Informed Consent: Patient understands risks and agrees with Anesthesia plan.  Questions answered. Anesthesia consent signed with patient.  ASA Score: 3     Day of Surgery Review of History & Physical:    H&P update referred to the surgeon.         Ready For Surgery From Anesthesia Perspective.

## 2020-03-18 NOTE — PLAN OF CARE
SW is following this Pt for DC planning needs. There are no identified needs at this time.    SW will continue to coordinate with patient, family, team and insurance to complete patient's discharge plan.    Millie Gaming LMSW   - Case Management

## 2020-03-18 NOTE — OP NOTE
DATE OF PROCEDURE: 03/18/2020     PREOPERATIVE DIAGNOSIS: Acute appendicitis.     POSTOPERATIVE DIAGNOSIS: Acute appendicitis.     PROCEDURE PERFORMED: Laparoscopic appendectomy.     ATTENDING SURGEON: Hunter Alarcon M.D.     HOUSESTAFF SURGEON: Courtney Leone M.D. (RES)     ANESTHESIA: General endotracheal.     ESTIMATED BLOOD LOSS: 5 mL.     FINDINGS: Acute non-perforated appendicitis.     SPECIMEN: Appendix.     DRAINS: None.     COMPLICATIONS: None.     INDICATIONS: Arianna Duval is a 39 y.o. female who presented as transfer from [facility] with episodes of VTach given long QT syndrome with acute electrolyte abnormalities. She was also complaining of lower abdominal pain. The history and exam were consistent with acute appendicitis, which was confirmed by laboratory studies and a CT scan. We recommended laparoscopic appendectomy and the patient agreed to proceed. The patient signed informed consent and expressed understanding of the risks and benefits of surgery.     OPERATIVE PROCEDURE: The patient was identified in Preoperative Holding and brought back to the Operating Room. Placed supine on the operating table and padded appropriately. Monitors were applied and there was smooth induction of general endotracheal anesthesia. A Serra catheter was placed. The patient's abdomen was prepped and draped in the standard sterile surgical fashion. A time-out was performed and all team members present agreed this was the correct procedure on the correct patient. We also confirmed administration of appropriate preoperative antibiotics.    A 2-cm infraumbilical skin incision was made. Subcutaneous tissue was bluntly dissected. The fascia was grasped with two kocher clamps and elevated and a 1.5-cm midline infraumbilical fascial incision was made. The abdomen was bluntly entered under direct vision. A Tobin trocar was placed and the abdomen was insufflated with carbon dioxide to a maximum pressure of 15 mmHg. A 10-mm  laparoscope was placed and the abdomen was examined. There was no evidence of injury from the initial trocar placement. Two 5-mm trocars were placed under direct vision through separate stab incisions, one in the suprapubic area avoiding the dome of the bladder and one in the left lower quadrant avoiding the inferior epigastric artery. We directed our attention to the right lower quadrant. The appendix was identified and noted to have significant inflammatory change without evidence of perforation. The appendix was elevated. A mesenteric defect was made at the base of the appendix using the [place] dissector. The appendix was divided at the base using the Endo-KENRICK stapler with a blue (45-3.5) load. The mesoappendix was then divided with two white (45-2.5) loads of the stapler. The appendix was placed into an Endocatch bag and removed from the Tobin trocar without difficulty. We returned the laparoscope and Tobin trocar to the umbilicus and reexamined the right lower quadrant. The staple line on the mesoappendix was examined and no bleeding was noted. The base of the appendix was examined and appeared viable and well-sealed. The right lower quadrant was irrigated with saline until the returning effluent was clear. All ports were removed under direct vision and no bleeding from any port site was noted. The insufflation of the abdomen was evacuated and the laparoscope and Tobin trocar were removed. The fascial incision at the umbilical port site was closed with a figure of eight 0 Vicryl stitch. All port sites were infiltrated with Marcaine and closed in a subcuticular fashion. Sterile dressings were applied. The patient's Serra catheter was removed. The patient was extubated in the Operating Room and transported back to her ICU room in stable condition. All sponge, instrument and needle counts were correct at the end of the case.

## 2020-03-18 NOTE — ED PROVIDER NOTES
Encounter Date: 3/17/2020       History     Chief Complaint   Patient presents with    Chest Pain     PAtient is having runs of Vtach and being defibrilated by pacemaker    Abdominal Pain     c/o pf difuse abd pain      HPI   Ms. Duval is a 38 yo F w/ a pmh of long qt syndrome, AICD placement for it, as well as history of an MI following her giving birth by 5 weeks, presenting to the emergency department for evaluation of abdominal pain, chest pain, fever and chills, as well as multiple AICD shocks today, patient states that this morning she started to have lower abdominal cramping sensation, is then progressed to diffuse abdominal pain, patient states that she has been having some fever and chills, but then started to have episodes of palpitations, lightheadedness and dizziness, and shocks by her AICD.  Per EMS patient had multiple runs of nonsustained V-tach, as well as V-tach that was then shocked with return of her normal sinus rhythm.      Review of patient's allergies indicates:   Allergen Reactions    Sulfa (sulfonamide antibiotics)      Past Medical History:   Diagnosis Date    Abnormal Pap smear     Abnormal Pap smear of cervix     Abnormal Pap smear of vagina     Acute blood loss anemia 8/31/2013    Defibrillator discharge     Long Q-T syndrome      Past Surgical History:   Procedure Laterality Date    breast reduction      CARDIAC PACEMAKER PLACEMENT      COLPOSCOPY      REPLACEMENT OF IMPLANTABLE CARDIOVERTER-DEFIBRILLATOR (ICD) GENERATOR N/A 9/20/2018    Procedure: REPLACEMENT, PULSE GENERATOR, ICD;  Surgeon: Flex Bryson MD;  Location: [facility] CATH LAB;  Service: Cardiology;  Laterality: N/A;  BRANDO, Dual ICD gen change, MDT, MAC, SK, 3 Prep     Family History   Problem Relation Age of Onset    Breast cancer Maternal Grandmother     Breast cancer Maternal Aunt     Long QT syndrome Mother     Long QT syndrome Brother     Psoriasis Neg Hx     Lupus Neg Hx     Eczema Neg Hx     Acne Neg  Hx     Melanoma Neg Hx     Ovarian cancer Neg Hx     Colon cancer Neg Hx      Social History     Tobacco Use    Smoking status: Never Smoker    Smokeless tobacco: Never Used   Substance Use Topics    Alcohol use: Yes     Comment: socially    Drug use: No     Review of Systems   Constitutional: Positive for appetite change, chills, fatigue and fever. Negative for diaphoresis.   HENT: Negative for congestion, sinus pressure, sneezing and sore throat.    Eyes: Negative for photophobia and visual disturbance.   Respiratory: Negative for cough, shortness of breath and wheezing.    Cardiovascular: Positive for chest pain. Negative for palpitations and leg swelling.   Gastrointestinal: Positive for abdominal pain and nausea. Negative for abdominal distention, diarrhea and vomiting.   Genitourinary: Negative for flank pain, frequency and hematuria.   Musculoskeletal: Negative for back pain and neck pain.   Skin: Negative for rash.   Neurological: Negative for dizziness, tremors, seizures, speech difficulty, light-headedness and headaches.   Psychiatric/Behavioral: Negative for agitation and confusion.   All other systems reviewed and are negative.      Physical Exam     Initial Vitals   BP Pulse Resp Temp SpO2   03/17/20 2039 03/17/20 2039 03/17/20 2050 -- 03/17/20 2039   (!) 149/109 108 19  100 %      MAP       --                Physical Exam    Nursing note and vitals reviewed.  Constitutional: She appears well-developed and well-nourished. She is not diaphoretic. She is cooperative.  Non-toxic appearance. She does not have a sickly appearance. She appears ill. She appears distressed.   HENT:   Head: Normocephalic and atraumatic.   Mouth/Throat: Oropharynx is clear and moist. No oropharyngeal exudate.   Eyes: Conjunctivae and EOM are normal. Pupils are equal, round, and reactive to light. No scleral icterus.   Neck: Normal range of motion. No JVD present.   Cardiovascular: Intact distal pulses. An irregular rhythm  present.   Extrasystoles are present.  Tachycardia present.  Exam reveals no gallop.    Pulmonary/Chest: Breath sounds normal. No accessory muscle usage or stridor. Tachypnea noted. No bradypnea. No respiratory distress. She has no decreased breath sounds. She has no wheezes. She has no rhonchi. She has no rales.   Abdominal: Soft. Bowel sounds are normal. She exhibits no distension. There is generalized tenderness. There is no rigidity, no rebound, no guarding, no tenderness at McBurney's point and negative Swift's sign.   Musculoskeletal: Normal range of motion. She exhibits no edema or tenderness.   Neurological: She is alert and oriented to person, place, and time. She has normal strength. No sensory deficit. GCS score is 15. GCS eye subscore is 4. GCS verbal subscore is 5. GCS motor subscore is 6.   Skin: Skin is warm and dry. Capillary refill takes less than 2 seconds. No rash and no abscess noted. No erythema. No pallor.       Bedside ultrasound shows appropriate ejection fraction, no pericardial effusion noted, no B-lines on lung exam, no free fluid in the abdomen on fast exam, shows no overt hydronephrosis, abdominal aortic aneurysms within a normal size, IVC appears to be not fully collapsible.    ED Course   Critical Care  Date/Time: 3/17/2020 11:00 PM  Performed by: Jeffry Soler MD  Authorized by: Jeffry Soler MD   Direct patient critical care time: 25 minutes  Documentation critical care time: 5 minutes  Total critical care time (exclusive of procedural time) : 30 minutes        Labs Reviewed   CBC W/ AUTO DIFFERENTIAL - Abnormal; Notable for the following components:       Result Value    RBC 3.87 (*)     Mean Corpuscular Hemoglobin 33.6 (*)     Platelets 130 (*)     Gran # (ANC) 11.3 (*)     Lymph # 0.4 (*)     Gran% 92.5 (*)     Lymph% 3.0 (*)     All other components within normal limits   COMPREHENSIVE METABOLIC PANEL - Abnormal; Notable for the following components:    Sodium 135 (*)     Potassium  3.2 (*)     CO2 20 (*)     Glucose 188 (*)     Total Bilirubin 2.3 (*)     All other components within normal limits   MAGNESIUM   HCG, QUANTITATIVE, PREGNANCY   B-TYPE NATRIURETIC PEPTIDE   TROPONIN I   PROTIME-INR   MAGNESIUM   INFLUENZA A AND B ANTIGEN   PROCALCITONIN     EKG Readings: (Independently Interpreted)   Initial Reading: No STEMI. Rhythm: Sinus Tachycardia. Ectopy: No Ectopy. Conduction: Normal.   QT prolongation noted       Imaging Results          X-Ray Chest AP Portable (In process)  Result time 03/17/20 20:48:47              X-Rays:   Independently Interpreted Readings:   Other Readings:  Chest x-ray unremarkable    Medical Decision Making:   Differential Diagnosis:   Patient presented emergency department with abdominal pain and some shortness of breath and cough and nausea all day today and this evening started having defibrillator shocking.  Patient had multiple episodes of defibrillator shocking her and patient started feeling chest pain after the shocks.  Patient had previous history of QT prolongation and postpartum cardiomyopathy and had a pacemaker and defibrillator paced about 9 years ago.  Patient had improvement of symptoms with hydration and patient treated with magnesium and potassium as patient noted to be hypokalemic.  After discussion with cardiologist beta-blockers given and patient converted to sinus rhythm.  Initially patient had runs of V-tach which resolved after treatment.  Case discussed with cardiologist who recommended transferring patient for high level of care and electrophysiologist.  Pacemaker interrogated which showed multiple episodes of unsustained V-tach and 4 episodes of shocking when patient went into VFib.  Patient currently had improvement of symptoms.  CT of the chest and abdomen done for evaluation of her symptoms.  Screening labs reviewed.  Hypomagnesemia and hypokalemia treated.  Case discussed with Dr. stovall, critical care cardiologist at [facility] who  accepted the patient as transfer.  Patient did have improvement of symptoms so transferred  ED Management:  CT scan shows evidence of acute appendicitis with possible perforation.  Patient had mild peritonitis.  Broad-spectrum antibiotics started.  Transfer center contacted again and notified them of the CT report and patient would need General surgery consultation and surgery given patient's acute cardiac issues will continue with the transfer                             HO4  31-year-old female with history as above, presenting to the emergency department for evaluation of abdominal pain, multiple AICD shocks.  AICD interrogated, shows over 50 episodes of Non sustained vtach, as well as ventricular rates of 280-300, 4-5 episodes of v fib with aicd shocks to normal rhythm.   Amiodarone appears to be a poor choice of antiarrhythmic given patient's long QT.  Discussed with our cardiology service here who recommended Lopressor IV and p.o. metoprolol for control of rhythm, this was ordered, as well as magnesium.  Patient has had potassium repleted  Labs and imaging ordered including a CT abdomen pelvis with contrast as well as a CT chest with contrast, chest x-ray ordered, chest x-ray appears to have no focal consolidations, no pneumothorax, no effusions.    Mildly hypokalemic, No leukocytosis noted patient has stable hemoglobin and hematocrit, patient does have an elevated anion at 16, preserved renal function.  Dr. Calix accepted transfer to OSH for further evaluation, evaluation in the ED, and likely EP consult.   Nehemiah Tobar MD PGY4  03/17/2020 10:14 PM    Clinical Impression:       ICD-10-CM ICD-9-CM   1. Ventricular tachycardia I47.2 427.1   2. Chest pain R07.9 786.50   3. Generalized abdominal pain R10.84 789.07   4. Nausea R11.0 787.02             ED Disposition Condition    Transfer to Another Facility Stable             Patient seen and evaluated and examined along with that resident house officer.   Agree with plan and management and evaluation             Jeffry Soler MD  03/17/20 [id number]       Jeffry Soler MD  03/18/20 0003

## 2020-03-19 ENCOUNTER — TELEPHONE (OUTPATIENT)
Dept: CARDIOLOGY | Facility: HOSPITAL | Age: 40
End: 2020-03-19

## 2020-03-19 VITALS
BODY MASS INDEX: 18.95 KG/M2 | DIASTOLIC BLOOD PRESSURE: 67 MMHG | HEIGHT: 62 IN | SYSTOLIC BLOOD PRESSURE: 122 MMHG | OXYGEN SATURATION: 97 % | TEMPERATURE: 98 F | WEIGHT: 103 LBS | HEART RATE: 61 BPM | RESPIRATION RATE: 16 BRPM

## 2020-03-19 PROBLEM — E83.42 HYPOMAGNESEMIA: Status: RESOLVED | Noted: 2020-03-17 | Resolved: 2020-03-19

## 2020-03-19 PROBLEM — E87.6 HYPOKALEMIA: Status: RESOLVED | Noted: 2020-03-17 | Resolved: 2020-03-19

## 2020-03-19 PROBLEM — K35.80 ACUTE APPENDICITIS: Status: RESOLVED | Noted: 2020-03-17 | Resolved: 2020-03-19

## 2020-03-19 PROBLEM — D69.6 THROMBOCYTOPENIA: Status: RESOLVED | Noted: 2020-03-17 | Resolved: 2020-03-19

## 2020-03-19 PROBLEM — I47.20 VT (VENTRICULAR TACHYCARDIA): Status: RESOLVED | Noted: 2020-03-18 | Resolved: 2020-03-19

## 2020-03-19 LAB
ALBUMIN SERPL BCP-MCNC: 3.5 G/DL (ref 3.5–5.2)
ALP SERPL-CCNC: 66 U/L (ref 55–135)
ALT SERPL W/O P-5'-P-CCNC: 8 U/L (ref 10–44)
ANION GAP SERPL CALC-SCNC: 7 MMOL/L (ref 8–16)
AST SERPL-CCNC: 18 U/L (ref 10–40)
BASOPHILS # BLD AUTO: 0 K/UL (ref 0–0.2)
BASOPHILS NFR BLD: 0 % (ref 0–1.9)
BILIRUB SERPL-MCNC: 0.9 MG/DL (ref 0.1–1)
BUN SERPL-MCNC: 9 MG/DL (ref 6–20)
CALCIUM SERPL-MCNC: 8.8 MG/DL (ref 8.7–10.5)
CHLORIDE SERPL-SCNC: 106 MMOL/L (ref 95–110)
CO2 SERPL-SCNC: 23 MMOL/L (ref 23–29)
CREAT SERPL-MCNC: 0.6 MG/DL (ref 0.5–1.4)
DIFFERENTIAL METHOD: ABNORMAL
EOSINOPHIL # BLD AUTO: 0 K/UL (ref 0–0.5)
EOSINOPHIL NFR BLD: 0.1 % (ref 0–8)
ERYTHROCYTE [DISTWIDTH] IN BLOOD BY AUTOMATED COUNT: 12.1 % (ref 11.5–14.5)
EST. GFR  (AFRICAN AMERICAN): >60 ML/MIN/1.73 M^2
EST. GFR  (NON AFRICAN AMERICAN): >60 ML/MIN/1.73 M^2
GLUCOSE SERPL-MCNC: 133 MG/DL (ref 70–110)
HCT VFR BLD AUTO: 33.9 % (ref 37–48.5)
HGB BLD-MCNC: 11.1 G/DL (ref 12–16)
IMM GRANULOCYTES # BLD AUTO: 0.11 K/UL (ref 0–0.04)
IMM GRANULOCYTES NFR BLD AUTO: 1.1 % (ref 0–0.5)
LYMPHOCYTES # BLD AUTO: 0.8 K/UL (ref 1–4.8)
LYMPHOCYTES NFR BLD: 7.5 % (ref 18–48)
MAGNESIUM SERPL-MCNC: 2.2 MG/DL (ref 1.6–2.6)
MCH RBC QN AUTO: 33.9 PG (ref 27–31)
MCHC RBC AUTO-ENTMCNC: 32.7 G/DL (ref 32–36)
MCV RBC AUTO: 104 FL (ref 82–98)
MONOCYTES # BLD AUTO: 0.5 K/UL (ref 0.3–1)
MONOCYTES NFR BLD: 4.8 % (ref 4–15)
NEUTROPHILS # BLD AUTO: 9 K/UL (ref 1.8–7.7)
NEUTROPHILS NFR BLD: 86.5 % (ref 38–73)
NRBC BLD-RTO: 0 /100 WBC
PLATELET # BLD AUTO: 119 K/UL (ref 150–350)
PMV BLD AUTO: 11.8 FL (ref 9.2–12.9)
POTASSIUM SERPL-SCNC: 4.1 MMOL/L (ref 3.5–5.1)
PROT SERPL-MCNC: 6.8 G/DL (ref 6–8.4)
RBC # BLD AUTO: 3.27 M/UL (ref 4–5.4)
SODIUM SERPL-SCNC: 136 MMOL/L (ref 136–145)
WBC # BLD AUTO: 10.41 K/UL (ref 3.9–12.7)

## 2020-03-19 PROCEDURE — 63600175 PHARM REV CODE 636 W HCPCS: Performed by: INTERNAL MEDICINE

## 2020-03-19 PROCEDURE — 83735 ASSAY OF MAGNESIUM: CPT

## 2020-03-19 PROCEDURE — 85025 COMPLETE CBC W/AUTO DIFF WBC: CPT

## 2020-03-19 PROCEDURE — 36415 COLL VENOUS BLD VENIPUNCTURE: CPT

## 2020-03-19 PROCEDURE — 80053 COMPREHEN METABOLIC PANEL: CPT

## 2020-03-19 RX ORDER — METOPROLOL SUCCINATE 25 MG/1
50 TABLET, EXTENDED RELEASE ORAL DAILY
Status: DISCONTINUED | OUTPATIENT
Start: 2020-03-19 | End: 2020-03-19 | Stop reason: HOSPADM

## 2020-03-19 RX ORDER — METOPROLOL SUCCINATE 50 MG/1
50 TABLET, EXTENDED RELEASE ORAL DAILY
Qty: 30 TABLET | Refills: 11 | Status: SHIPPED | OUTPATIENT
Start: 2020-03-19 | End: 2021-03-19

## 2020-03-19 RX ADMIN — MORPHINE SULFATE 2 MG: 2 INJECTION, SOLUTION INTRAMUSCULAR; INTRAVENOUS at 05:03

## 2020-03-19 NOTE — HOSPITAL COURSE
Patient underwent laparoscopic appendectomy on 3/18. No complications. Able to tolerate PO. In regard to VT, likely from electrolyte imbalance. No further episodes while inpatient. Electrolytes stable upon discharge. Metoprolol XL increased from 25 to 50 mg.     Physical exam at discharge  Constitutional: She appears well-developed and well-nourished. No distress.   HENT:   Head: Normocephalic and atraumatic.   Eyes: EOM are normal. Right eye exhibits no discharge. Left eye exhibits no discharge. No scleral icterus.   Neck: Normal range of motion. Neck supple.   Cardiovascular: Normal rate, regular rhythm, S1 normal, S2 normal, normal heart sounds, intact distal pulses and normal pulses.  No extrasystoles are present. Exam reveals no gallop, no S3, no S4, no distant heart sounds, no friction rub and no opening snap.   No murmur heard.  Pulses:       Radial pulses are 2+ on the right side, and 2+ on the left side.        Dorsalis pedis pulses are 2+ on the right side, and 2+ on the left side.        Posterior tibial pulses are 2+ on the right side, and 2+ on the left side.   Pulmonary/Chest: Effort normal. No respiratory distress. She has no wheezes. She has no rales.   Abdominal: Soft. Bowel sounds are normal. She exhibits no distension.   Musculoskeletal: Normal range of motion. She exhibits no edema or deformity.   Neurological: She is alert.   Skin: Skin is warm and dry. No rash noted. She is not diaphoretic. No erythema.   Nursing note and vitals reviewed.

## 2020-03-19 NOTE — TELEPHONE ENCOUNTER
----- Message from Augusta Garcia RN sent at 3/19/2020 12:07 PM CDT -----      ----- Message -----  From: Connie Agarwal MA  Sent: 3/19/2020  11:11 AM CDT  To: Augusta Garcia, RN, #    Not sure when yall might want this pt to have a follow up with us?  Pt was in the hospital for an appendectomy this week and her CRTD went off while in the hospital.  Please advise.    Thanks,  Connie  ----- Message -----  From: Rosy Gama RN  Sent: 3/19/2020   8:27 AM CDT  To: [facility] Staff    Pt discharged this AM. Needs clinic follow up, please.  Thank You,  Rosy  [phone]

## 2020-03-19 NOTE — PLAN OF CARE
Arianna Duval is a 39 y.o. F with long QT syndrome s/p MDT DC ICD (2011 & gen change 9/2018) who presented with ICD shocks x 4 for VT in the setting of acute appendicitis, recent nausea, vomiting, K 3.2, Mg 1.3. Electrolytes were replaced and pt underwent lap appendectomy on 3/18/20.     Post-operatively, she has done well and not had further episodes of VT on telemetry.  Of note, TTE 3/18/20 noted EF 65% and no other structural abnormalities.   Please increase home metoprolol XL dose from 25 mg to 50 mg daily.   Goal K > 4, Mg > 2    We will sign off now. Please don't hesitate to page with questions.     Gypsy Burt MD  Cardiology, PGY IV  Pager: [phone]

## 2020-03-19 NOTE — PROGRESS NOTES
[facility]  General Surgery  Progress Note    Subjective:     History of Present Illness:  38 y/o F with AICD for long QT syndrome. She was on the [facility] whne she started having abdominal pain that started yesterday morning.  Pain gradually increased and pt started having N/V.  She was on the way to the hospital when she started feeling acutely tired, and her AICD went off.  [place] [facility] [place].       Post-Op Info:  Procedure(s) (LRB):  APPENDECTOMY, LAPAROSCOPIC (N/A)   1 Day Post-Op     Interval History: No acute events overnight.  Tolerated clear liquid diet without issue. No nausea or vomiting. Pain well controlled. Had 1 BM. AF, VSS.    Medications:  Continuous Infusions:  Scheduled Meds:   metoprolol succinate  25 mg Oral Daily     PRN Meds:morphine, sodium chloride 0.9%     Review of patient's allergies indicates:   Allergen Reactions    Sulfa (sulfonamide antibiotics)      Objective:     Vital Signs (Most Recent):  Temp: 97.6 °F (36.4 °C) (03/19/20 0734)  Pulse: 61 (03/19/20 0734)  Resp: 16 (03/19/20 0734)  BP: 122/67 (03/19/20 0734)  SpO2: 97 % (03/19/20 0734) Vital Signs (24h Range):  Temp:  [97.6 °F (36.4 °C)-98.4 °F (36.9 °C)] 97.6 °F (36.4 °C)  Pulse:  [50-77] 61  Resp:  [12-38] 16  SpO2:  [97 %-100 %] 97 %  BP: (103-124)/(59-76) 122/67  Arterial Line BP: (112-132)/(56-73) 127/62     Weight: 46.7 kg (103 lb)  Body mass index is 18.84 kg/m².    Intake/Output - Last 3 Shifts       03/17 0700 - 03/18 0659 03/18 0700 - 03/19 0659 03/19 0700 - 03/20 0659    P.O.  720     I.V. (mL/kg)  1000 (21.4)     Total Intake(mL/kg)  1720 (36.8)     Urine (mL/kg/hr) 300      Total Output 300      Net -300 +1720            Urine Occurrence 1 x 6 x     Stool Occurrence  0 x           Physical Exam   Constitutional: She is oriented to person, place, and time. She appears well-developed.   Neck: No JVD present. No tracheal deviation present.   Cardiovascular: Normal  rate and regular rhythm.   Pulmonary/Chest: Breath sounds normal. No respiratory distress.   Abdominal: Soft. She exhibits no distension and no mass. There is no tenderness. There is no rebound and no guarding.   Surgical incisions clean, dry, and intact with Dermabond dressing in place. Minimal tenderness at surgical sites, but RLQ pain improved   Musculoskeletal: She exhibits no edema.   Neurological: She is alert and oriented to person, place, and time.   Skin: Skin is warm and dry.       Significant Labs:  CBC:   Recent Labs   Lab 03/19/20  0435   WBC 10.41   RBC 3.27*   HGB 11.1*   HCT 33.9*   *   *   MCH 33.9*   MCHC 32.7     CMP:   Recent Labs   Lab 03/19/20  0435   *   CALCIUM 8.8   ALBUMIN 3.5   PROT 6.8      K 4.1   CO2 23      BUN 9   CREATININE 0.6   ALKPHOS 66   ALT 8*   AST 18   BILITOT 0.9       Significant Diagnostics:  I have reviewed all pertinent imaging results/findings within the past 24 hours.    Assessment/Plan:     Acute appendicitis  Patient is a 38yo female with cardiac history, AICD in place, presenting to the ED with runs of VTach and RLQ pain consistent with acute appendicitis. She is 1 Day Post-Op from laparoscopic appendectomy for non-perforated appendicitis.     - doing well postoperatively from a surgical standpoint  - do not need postop antibiotics - source control obtained, no leukocytosis  - would recommend advancement to a regular diet as tolerated  - defer to primary team for discharge planning when cardiac status is stabilized  - please call with any questions or concerns        Kayla Miranda MD  General Surgery  [facility]

## 2020-03-19 NOTE — DISCHARGE SUMMARY
[facility]  Cardiology  Discharge Summary      Patient Name: Arianna Duval  MRN: [id number]  Admission Date: 3/18/2020  Hospital Length of Stay: 1 days  Discharge Date and Time:  03/19/2020 6:12 PM  Attending Physician: No att. 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 Patient was seen by Dr. Bryson 6 days ago and was doing well at that time. Device interrogated with no events (with no events to date at that time), and 99% AS-. Patient reports diffuse abd pain that started this am with general malaise and N/V. She as coming back from house in [place] when she got shocked by her ICD. Sounds like she syncopized prior to this shock. She was shocked a total of 4 times (once in the ambulance). Only new medications she has taken is NSAIDs and mylanta.    In the ED, she was HD stable, afebrile and sating well on RA. Labs significant for K 3.2, mag 1.3, hgb 12 with plt 130 (baseline 200s), cr 0.7, TB 2.3 with normal LFTs otherwise, , tp 0.03, b-HCG negative, flu negative, procalcitonin wnl, TSH wnl 10/2019. CXR with no acute cardiopulmonary findings. ECG on arrival with AP-VS in [facility]. Repeat ECG with QTc 600 with ST depressions and TWI in inferolateral leads. CT abd/pelvis consistent with acute appendicitis. She was transferred to [facility] for EP evaluation.     Interrogation:  VF x 4 with 3 episodes appropriately shocked (4 times total) and one aborted.    ED treatment:  -  -Metoprolol tartrate 5 mg IV x 1 --> metoprolol 25 mg PO  -Mag 3g IV x 1  -KCl 40 meq PO x 1 and 10 meq IV  -Morphine 2 mg IV x  1    Device:  Mode: AAI <=> DDD  Lower rate 50  Upper track 130  Upper sensor 120  ZONES:  AT/AF: monitor  FVT: off  VT: off  VF (>231 bpm): ATP while charging --> 35J x 6    Procedure(s) (LRB):  APPENDECTOMY, LAPAROSCOPIC (N/A)     Indwelling Lines/Drains at time of discharge:  Lines/Drains/Airways     None                 Hospital Course:  Patient underwent laparoscopic appendectomy on 3/18. No complications. Able to tolerate PO. In regard to VT, likely from electrolyte imbalance. No further episodes while inpatient. Electrolytes stable upon discharge. Metoprolol XL increased from 25 to 50 mg.     Physical exam at discharge  Constitutional: She appears well-developed and well-nourished. No distress.   HENT:   Head: Normocephalic and atraumatic.   Eyes: EOM are normal. Right eye exhibits no discharge. Left eye exhibits no discharge. No scleral icterus.   Neck: Normal range of motion. Neck supple.   Cardiovascular: Normal rate, regular rhythm, S1 normal, S2 normal, normal heart sounds, intact distal pulses and normal pulses.  No extrasystoles are present. Exam reveals no gallop, no S3, no S4, no distant heart sounds, no friction rub and no opening snap.   No murmur heard.  Pulses:       Radial pulses are 2+ on the right side, and 2+ on the left side.        Dorsalis pedis pulses are 2+ on the right side, and 2+ on the left side.        Posterior tibial pulses are 2+ on the right side, and 2+ on the left side.   Pulmonary/Chest: Effort normal. No respiratory distress. She has no wheezes. She has no rales.   Abdominal: Soft. Bowel sounds are normal. She exhibits no distension.   Musculoskeletal: Normal range of motion. She exhibits no edema or deformity.   Neurological: She is alert.   Skin: Skin is warm and dry. No rash noted. She is not diaphoretic. No erythema.   Nursing note and vitals reviewed.     Consults:   Consults (From admission, onward)        Status Ordering Provider     Inpatient consult to  Electrophysiology  Once     Provider:  (Not yet assigned)    Completed LEIGHA FOSTER     Inpatient consult to General Surgery  Once     Provider:  Juan A Rea MD    Completed LEIGHA FOSTER          Significant Diagnostic Studies: Labs:   CMP   Recent Labs   Lab 03/18/20  0230 03/18/20  0757 03/18/20  1638 03/19/20  0435   * 135* 134* 136   K 3.7 4.8 4.2 4.1    107 106 106   CO2 22* 19* 19* 23   * 123* 238* 133*   BUN 8 6 8 9   CREATININE 0.7 0.6 0.7 0.6   CALCIUM 8.3* 7.9* 8.2* 8.8   PROT 7.2 7.1  --  6.8   ALBUMIN 4.0 3.7  --  3.5   BILITOT 2.0* 1.4*  --  0.9   ALKPHOS 58 54*  --  66   AST 17 26  --  18   ALT 9* 9*  --  8*   ANIONGAP 12 9 9 7*   ESTGFRAFRICA >60.0 >60.0 >60.0 >60.0   EGFRNONAA >60.0 >60.0 >60.0 >60.0    and CBC   Recent Labs   Lab 03/18/20  0230 03/18/20  0626 03/19/20  0435   WBC 16.22* 16.30* 10.41   HGB 12.0 11.8* 11.1*   HCT 35.6* 36.1* 33.9*   * 122* 119*       Pending Diagnostic Studies:     Procedure Component Value Units Date/Time    Specimen to Pathology, Surgery General Surgery [[id number]] Collected:  03/18/20 1247    Order Status:  Sent Lab Status:  In process Updated:  03/18/20 1439          Final Active Diagnoses:    Diagnosis Date Noted POA    PRINCIPAL PROBLEM:  Long Q-T syndrome [I45.81] 01/31/2013 Yes    Automatic implantable cardioverter-defibrillator in situ [Z95.810] 01/31/2013 Yes      Problems Resolved During this Admission:    Diagnosis Date Noted Date Resolved POA    VT (ventricular tachycardia) [I47.2] 03/18/2020 03/19/2020 Yes    Hypomagnesemia [E83.42] 03/17/2020 03/19/2020 Yes    Hypokalemia [E87.6] 03/17/2020 03/19/2020 Yes    Thrombocytopenia [D69.6] 03/17/2020 03/19/2020 Yes    Acute appendicitis [K35.80] 03/17/2020 03/19/2020 Yes    Ventricular fibrillation [I49.01] 01/31/2013 03/19/2020 Yes     No new Assessment & Plan notes have been filed under this hospital service since the last note was generated.  Service:  Cardiology      Discharged Condition: stable    Disposition: Home or Self Care    Follow Up:    Patient Instructions:   No discharge procedures on file.  Medications:  Reconciled Home Medications:      Medication List      CHANGE how you take these medications    metoprolol succinate 50 MG 24 hr tablet  Commonly known as:  TOPROL-XL  Take 1 tablet (50 mg total) by mouth once daily.  What changed:    · medication strength  · how much to take        CONTINUE taking these medications    triamcinolone acetonide 0.1% 0.1 % cream  Commonly known as:  KENALOG  AAA BID x 1-2 wks then prn flares only. Avoid face, armpits, groin        STOP taking these medications    levocetirizine 5 MG tablet  Commonly known as:  XYZAL     potassium chloride SA 20 MEQ tablet  Commonly known as:  K-DUR,KLOR-CON     VITRON-C 65 mg iron- 125 mg Tbec  Generic drug:  iron,carbonyl-vitamin C            Time spent on the discharge of patient: 35 minutes    Kris Alfaro MD  Cardiology  [facility]

## 2020-03-19 NOTE — ASSESSMENT & PLAN NOTE
Patient is a 40yo female with cardiac history, AICD in place, presenting to the ED with runs of VTach and RLQ pain consistent with acute appendicitis. She is 1 Day Post-Op from laparoscopic appendectomy for non-perforated appendicitis.     - doing well postoperatively from a surgical standpoint  - do not need postop antibiotics - source control obtained, no leukocytosis  - would recommend advancement to a regular diet as tolerated  - defer to primary team for discharge planning when cardiac status is stabilized  - please call with any questions or concerns

## 2020-03-19 NOTE — NURSING
"Patient notified RN during shift change that her doctor texted her that he put in discharge orders. RN checked vitals and told patient that discharge orders had not been placed yet. RN paged team. Patient's visitor came to  and yelled "You need to stop what you are doing and check us out". RN notified charge nurse. Security called. Patient discharged. IV's removed. Went over AVS. Patient left via wheelchair.   "

## 2020-03-19 NOTE — SUBJECTIVE & OBJECTIVE
Interval History: No acute events overnight.  Tolerated clear liquid diet without issue. No nausea or vomiting. Pain well controlled. Had 1 BM. AF, VSS.    Medications:  Continuous Infusions:  Scheduled Meds:   metoprolol succinate  25 mg Oral Daily     PRN Meds:morphine, sodium chloride 0.9%     Review of patient's allergies indicates:   Allergen Reactions    Sulfa (sulfonamide antibiotics)      Objective:     Vital Signs (Most Recent):  Temp: 97.6 °F (36.4 °C) (03/19/20 0734)  Pulse: 61 (03/19/20 0734)  Resp: 16 (03/19/20 0734)  BP: 122/67 (03/19/20 0734)  SpO2: 97 % (03/19/20 0734) Vital Signs (24h Range):  Temp:  [97.6 °F (36.4 °C)-98.4 °F (36.9 °C)] 97.6 °F (36.4 °C)  Pulse:  [50-77] 61  Resp:  [12-38] 16  SpO2:  [97 %-100 %] 97 %  BP: (103-124)/(59-76) 122/67  Arterial Line BP: (112-132)/(56-73) 127/62     Weight: 46.7 kg (103 lb)  Body mass index is 18.84 kg/m².    Intake/Output - Last 3 Shifts       03/17 0700 - 03/18 0659 03/18 0700 - 03/19 0659 03/19 0700 - 03/20 0659    P.O.  720     I.V. (mL/kg)  1000 (21.4)     Total Intake(mL/kg)  1720 (36.8)     Urine (mL/kg/hr) 300      Total Output 300      Net -300 +1720            Urine Occurrence 1 x 6 x     Stool Occurrence  0 x           Physical Exam   Constitutional: She is oriented to person, place, and time. She appears well-developed.   Neck: No JVD present. No tracheal deviation present.   Cardiovascular: Normal rate and regular rhythm.   Pulmonary/Chest: Breath sounds normal. No respiratory distress.   Abdominal: Soft. She exhibits no distension and no mass. There is no tenderness. There is no rebound and no guarding.   Surgical incisions clean, dry, and intact with Dermabond dressing in place. Minimal tenderness at surgical sites, but RLQ pain improved   Musculoskeletal: She exhibits no edema.   Neurological: She is alert and oriented to person, place, and time.   Skin: Skin is warm and dry.       Significant Labs:  CBC:   Recent Labs   Lab  03/19/20  0435   WBC 10.41   RBC 3.27*   HGB 11.1*   HCT 33.9*   *   *   MCH 33.9*   MCHC 32.7     CMP:   Recent Labs   Lab 03/19/20 0435   *   CALCIUM 8.8   ALBUMIN 3.5   PROT 6.8      K 4.1   CO2 23      BUN 9   CREATININE 0.6   ALKPHOS 66   ALT 8*   AST 18   BILITOT 0.9       Significant Diagnostics:  I have reviewed all pertinent imaging results/findings within the past 24 hours.

## 2020-03-19 NOTE — PLAN OF CARE
Plan of care discussed with patient and spouse. Patient ambulating independently with standby assistance, fall precautions remain in place. Patient has no complaints of pain. Zosyn was discontinued. No drainage noted to abdomen from incision sites. Discussed medications and care. VSS. Patient has no questions at this time. Bed in lowest position. Call bell within. Will continue to monitor.

## 2020-03-19 NOTE — HPI
38 y/o F with AICD for long QT syndrome. She was on the [facility] she started having abdominal pain that started yesterday morning.  Pain gradually increased and pt started having N/V.  She was on the way to the hospital when she started feeling acutely tired, and her AICD went off.  [place] was [place] [facility] [place] higher level of care.

## 2020-03-19 NOTE — ANESTHESIA POSTPROCEDURE EVALUATION
Anesthesia Post Evaluation    Patient: Arianna Duval    Procedure(s) Performed: Procedure(s) (LRB):  APPENDECTOMY, LAPAROSCOPIC (N/A)    Final Anesthesia Type: general    Patient location during evaluation: PACU  Patient participation: Yes- Able to Participate  Level of consciousness: awake and alert  Post-procedure vital signs: reviewed and stable  Pain management: adequate  Airway patency: patent    PONV status at discharge: No PONV  Anesthetic complications: no      Cardiovascular status: hemodynamically stable  Respiratory status: spontaneous ventilation  Follow-up not needed.          Vitals Value Taken Time   /67 3/19/2020  7:34 AM   Temp 36.4 °C (97.6 °F) 3/19/2020  7:34 AM   Pulse 61 3/19/2020  7:34 AM   Resp 16 3/19/2020  7:34 AM   SpO2 97 % 3/19/2020  7:34 AM         No case tracking events are documented in the log.      Pain/Babar Score: Pain Rating Prior to Med Admin: 5 (3/19/2020  5:27 AM)  Pain Rating Post Med Admin: 0 (3/19/2020  5:57 AM)

## 2020-03-23 ENCOUNTER — TELEPHONE (OUTPATIENT)
Dept: INTERNAL MEDICINE | Facility: CLINIC | Age: 40
End: 2020-03-23

## 2020-03-23 ENCOUNTER — PATIENT OUTREACH (OUTPATIENT)
Dept: ADMINISTRATIVE | Facility: CLINIC | Age: 40
End: 2020-03-23

## 2020-03-23 NOTE — TELEPHONE ENCOUNTER
Called and spoke with pt regarding scheduling a Hospital follow up. 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 She will call back at a later date.

## 2020-03-23 NOTE — PROGRESS NOTES
C3 nurse attempted to contact patient. No answer. The following message was left for the patient to return the call:  Good morning I am a nurse calling on behalf of [facility] from the [facility].  This is a Transitional Care Call for Arianna. When you have a moment please contact us at [phone] or [phone] Monday through Friday, between the hours of 8 am to 4 pm. We look forward to speaking with you. On behalf of [facility] have a nice day.    The patient does not have a scheduled HOSFU appointment within 7-14 days post hospital discharge date 3/19/20. Message sent to Physician staff to assist with HOSFU appointment scheduling.

## 2020-03-24 LAB — FINAL PATHOLOGIC DIAGNOSIS: NORMAL

## 2020-05-05 ENCOUNTER — PATIENT MESSAGE (OUTPATIENT)
Dept: ADMINISTRATIVE | Facility: OTHER | Age: 40
End: 2020-05-05

## 2020-09-30 ENCOUNTER — OFFICE VISIT (OUTPATIENT)
Dept: OBSTETRICS AND GYNECOLOGY | Facility: CLINIC | Age: 40
End: 2020-09-30
Payer: COMMERCIAL

## 2020-09-30 VITALS
WEIGHT: 115.5 LBS | DIASTOLIC BLOOD PRESSURE: 66 MMHG | HEIGHT: 62 IN | BODY MASS INDEX: 21.25 KG/M2 | SYSTOLIC BLOOD PRESSURE: 116 MMHG

## 2020-09-30 DIAGNOSIS — Z80.3 FAMILY HISTORY OF BREAST CANCER: ICD-10-CM

## 2020-09-30 DIAGNOSIS — Z01.419 WELL WOMAN EXAM WITH ROUTINE GYNECOLOGICAL EXAM: Primary | ICD-10-CM

## 2020-09-30 DIAGNOSIS — N93.9 ABNORMAL UTERINE BLEEDING: ICD-10-CM

## 2020-09-30 PROCEDURE — 99999 PR PBB SHADOW E&M-EST. PATIENT-LVL III: ICD-10-PCS | Mod: PBBFAC,,, | Performed by: OBSTETRICS & GYNECOLOGY

## 2020-09-30 PROCEDURE — 99999 PR PBB SHADOW E&M-EST. PATIENT-LVL III: CPT | Mod: PBBFAC,,, | Performed by: OBSTETRICS & GYNECOLOGY

## 2020-09-30 PROCEDURE — 99395 PREV VISIT EST AGE 18-39: CPT | Mod: S$GLB,,, | Performed by: OBSTETRICS & GYNECOLOGY

## 2020-09-30 PROCEDURE — 99395 PR PREVENTIVE VISIT,EST,18-39: ICD-10-PCS | Mod: S$GLB,,, | Performed by: OBSTETRICS & GYNECOLOGY

## 2020-09-30 RX ORDER — POTASSIUM CHLORIDE 20 MEQ/1
TABLET, EXTENDED RELEASE ORAL
COMMUNITY
Start: 2020-08-16 | End: 2020-11-24 | Stop reason: SDUPTHER

## 2020-09-30 RX ORDER — METOPROLOL SUCCINATE 25 MG/1
TABLET, EXTENDED RELEASE ORAL
COMMUNITY
Start: 2020-08-16 | End: 2020-11-10

## 2020-09-30 NOTE — PROGRESS NOTES
Subjective:       Patient ID: Arianna Duval is a 39 y.o. female.    Chief Complaint:  Annual Exam    History of Present Illness:  HPI  SUBJECTIVE:   39 y.o. female  here for annual. Doing well. Getting remarried soon.     She describes her periods as regular, lasting 4-5 days. Heavy flow with passage of clots. Two cycles ago she had very heavy bleeding with passage of golfball sized clots. She felt a bump in her vagina at that time. Some cramping with menses. The following menstrual cycle was normal. She reports that her mother and grandmother had AUB.  Reports occasional break through bleeding. Sometimes after intercourse.  denies vaginal itching or irritation.  denies vaginal discharge.    She is sexually active with 1 partner.  She uses partner vasectomy for contraception.    History of abnormal pap: Yes - - LSIL  Last Pap: 2018 - ASCUS/HPV neg  Last MMG: N/A  Last Colonoscopy: N/A    FH: MGM breast cancer (50s), Maternal great great aunt breast cancer. Distant cousin breast cancer. Denies family history, GYN or colon cancer.    GYN & OB History  Patient's last menstrual period was 2020.   Date of Last Pap: 2018    OB History    Para Term  AB Living   2 2 2     2   SAB TAB Ectopic Multiple Live Births           2      # Outcome Date GA Lbr Fransico/2nd Weight Sex Delivery Anes PTL Lv   2 Term 13 38w5d 08:46 / 00:13 3.375 kg (7 lb 7.1 oz) M Vag-Spont EPI N ENA   1 Term 11 40w0d  3.515 kg (7 lb 12 oz) M Vag-Spont EPI  ENA       Review of Systems  Review of Systems   Constitutional: Negative for chills, diaphoresis, fatigue and fever.   Respiratory: Negative for cough and shortness of breath.    Cardiovascular: Negative for chest pain and palpitations.   Gastrointestinal: Negative for abdominal pain, constipation, diarrhea, nausea and vomiting.   Genitourinary: Negative for dyspareunia, pelvic pain, vaginal bleeding, vaginal discharge and vaginal pain.    Integumentary:  Negative for breast mass, nipple discharge and breast skin changes.   Neurological: Negative for headaches.   Psychiatric/Behavioral: Negative for depression. The patient is not nervous/anxious.    Breast: Negative for mass, mastodynia, nipple discharge and skin changes          Objective:    Physical Exam:   Constitutional: She is oriented to person, place, and time. She appears well-developed and well-nourished. No distress.    HENT:   Head: Normocephalic and atraumatic.     Neck: Normal range of motion.     Pulmonary/Chest: Effort normal. She exhibits no mass and no tenderness. Right breast exhibits no inverted nipple, no mass, no nipple discharge, no skin change, no tenderness and no swelling. Left breast exhibits no inverted nipple, no mass, no nipple discharge, no skin change, no tenderness and no swelling. Breasts are symmetrical.            Abdominal: Soft. She exhibits no distension. There is no abdominal tenderness.     Genitourinary:    Genitourinary Comments: Normal external female genitalia; vagina rugated, normal; cervix normal, no masses; uterus small mobile nontender; no adnexal masses palpated.             Musculoskeletal: Normal range of motion and moves all extremeties.       Neurological: She is alert and oriented to person, place, and time.    Skin: Skin is warm.    Psychiatric: She has a normal mood and affect. Her behavior is normal. Judgment and thought content normal.          Assessment:        1. Well woman exam with routine gynecological exam    2. Family history of breast cancer    3. Abnormal uterine bleeding               Plan:      Arianna was seen today for annual exam.    Diagnoses and all orders for this visit:    Well woman exam with routine gynecological exam  - Pap up to date. Repeat 1 year.  - MMG - she has had screening MMG 2 years ago. Did calculate an elevated T-C score. Will send to breast clinic for guidance on screening before and after age 40.  - Cscope  age 51.  - Contraception - partner vasectomy.   - Discussed NSAIDs with next cycle and consider further workup if AUB continues.    Family history of breast cancer  - Patient to discuss with family if MGM was tested for BRCA.    Orders Placed This Encounter   Procedures    Ambulatory referral/consult to Breast Surgery       Follow up in about 1 year (around 9/30/2021) for annual.        Tamiko Pagan MD  OB/GYN

## 2020-10-22 ENCOUNTER — CLINICAL SUPPORT (OUTPATIENT)
Dept: CARDIOLOGY | Facility: HOSPITAL | Age: 40
End: 2020-10-22
Attending: INTERNAL MEDICINE
Payer: COMMERCIAL

## 2020-10-22 DIAGNOSIS — Z95.810 ICD (IMPLANTABLE CARDIOVERTER-DEFIBRILLATOR) IN PLACE: ICD-10-CM

## 2020-10-22 PROCEDURE — 93295 CARDIAC DEVICE CHECK - REMOTE: ICD-10-PCS | Mod: ,,, | Performed by: INTERNAL MEDICINE

## 2020-10-22 PROCEDURE — 93296 REM INTERROG EVL PM/IDS: CPT | Performed by: INTERNAL MEDICINE

## 2020-10-22 PROCEDURE — 93295 DEV INTERROG REMOTE 1/2/MLT: CPT | Mod: ,,, | Performed by: INTERNAL MEDICINE

## 2020-10-27 ENCOUNTER — OFFICE VISIT (OUTPATIENT)
Dept: INTERNAL MEDICINE | Facility: CLINIC | Age: 40
End: 2020-10-27
Payer: COMMERCIAL

## 2020-10-27 ENCOUNTER — TELEPHONE (OUTPATIENT)
Dept: INTERNAL MEDICINE | Facility: CLINIC | Age: 40
End: 2020-10-27

## 2020-10-27 DIAGNOSIS — Z00.00 ROUTINE GENERAL MEDICAL EXAMINATION AT A HEALTH CARE FACILITY: Primary | ICD-10-CM

## 2020-10-27 PROCEDURE — 99499 NO LOS: ICD-10-PCS | Mod: S$GLB,,, | Performed by: INTERNAL MEDICINE

## 2020-10-27 PROCEDURE — 99499 UNLISTED E&M SERVICE: CPT | Mod: S$GLB,,, | Performed by: INTERNAL MEDICINE

## 2020-10-28 NOTE — TELEPHONE ENCOUNTER
Please notify pt   DR GOVEA is so sorry that she was running behind and you could not stay for your apt.     JEFERSON has ordered blood work.  When can you come for fasting blood work.    Please reschedule her apt - use D hold time.

## 2020-10-28 NOTE — TELEPHONE ENCOUNTER
Offered pt apologies and a future appt, however she doesn't want to schedule anything now. She will call back when she is ready.

## 2020-11-23 ENCOUNTER — OFFICE VISIT (OUTPATIENT)
Dept: DERMATOLOGY | Facility: CLINIC | Age: 40
End: 2020-11-23
Payer: COMMERCIAL

## 2020-11-23 DIAGNOSIS — D48.5 NEOPLASM OF UNCERTAIN BEHAVIOR OF SKIN: Primary | ICD-10-CM

## 2020-11-23 DIAGNOSIS — D22.9 MULTIPLE BENIGN NEVI: ICD-10-CM

## 2020-11-23 DIAGNOSIS — Z12.83 SCREENING EXAM FOR SKIN CANCER: ICD-10-CM

## 2020-11-23 PROCEDURE — 99214 PR OFFICE/OUTPT VISIT, EST, LEVL IV, 30-39 MIN: ICD-10-PCS | Mod: 25,S$GLB,, | Performed by: DERMATOLOGY

## 2020-11-23 PROCEDURE — 88342 CHG IMMUNOCYTOCHEMISTRY: ICD-10-PCS | Mod: 26,,, | Performed by: PATHOLOGY

## 2020-11-23 PROCEDURE — 88342 IMHCHEM/IMCYTCHM 1ST ANTB: CPT | Performed by: PATHOLOGY

## 2020-11-23 PROCEDURE — 88305 TISSUE EXAM BY PATHOLOGIST: CPT | Performed by: PATHOLOGY

## 2020-11-23 PROCEDURE — 1126F PR PAIN SEVERITY QUANTIFIED, NO PAIN PRESENT: ICD-10-PCS | Mod: S$GLB,,, | Performed by: DERMATOLOGY

## 2020-11-23 PROCEDURE — 88305 TISSUE EXAM BY PATHOLOGIST: ICD-10-PCS | Mod: 26,,, | Performed by: PATHOLOGY

## 2020-11-23 PROCEDURE — 88342 IMHCHEM/IMCYTCHM 1ST ANTB: CPT | Mod: 26,,, | Performed by: PATHOLOGY

## 2020-11-23 PROCEDURE — 99999 PR PBB SHADOW E&M-EST. PATIENT-LVL II: CPT | Mod: PBBFAC,,, | Performed by: DERMATOLOGY

## 2020-11-23 PROCEDURE — 99214 OFFICE O/P EST MOD 30 MIN: CPT | Mod: 25,S$GLB,, | Performed by: DERMATOLOGY

## 2020-11-23 PROCEDURE — 11102 TANGNTL BX SKIN SINGLE LES: CPT | Mod: S$GLB,,, | Performed by: DERMATOLOGY

## 2020-11-23 PROCEDURE — 99999 PR PBB SHADOW E&M-EST. PATIENT-LVL II: ICD-10-PCS | Mod: PBBFAC,,, | Performed by: DERMATOLOGY

## 2020-11-23 PROCEDURE — 88305 TISSUE EXAM BY PATHOLOGIST: CPT | Mod: 26,,, | Performed by: PATHOLOGY

## 2020-11-23 PROCEDURE — 1126F AMNT PAIN NOTED NONE PRSNT: CPT | Mod: S$GLB,,, | Performed by: DERMATOLOGY

## 2020-11-23 PROCEDURE — 11102 PR TANGENTIAL BIOPSY, SKIN, SINGLE LESION: ICD-10-PCS | Mod: S$GLB,,, | Performed by: DERMATOLOGY

## 2020-11-23 NOTE — PROGRESS NOTES
Subjective:       Patient ID:  Arianna Duval is a 40 y.o. female who presents for   Chief Complaint   Patient presents with    Skin Check     TBSE     History of Present Illness: The patient presents for follow up of skin check.  Barrera TBSE    The patient was last seen on: 2018 for skin check.   No h/o nmsc   Other skin complaints: none    Patient here for Total Body Skin Exam    no - personal history of MM  no - family history of MM  yes - childhood blistering sunburns  distant - tanning bed use        Review of Systems   Constitutional: Negative for fever, chills, weight loss, weight gain, fatigue, night sweats and malaise.   Skin: Positive for daily sunscreen use and activity-related sunscreen use. Negative for recent sunburn.   Hematologic/Lymphatic: Does not bruise/bleed easily (has pacemaker and defib. ).        Objective:    Physical Exam   Constitutional: She appears well-developed and well-nourished. No distress.   Neurological: She is alert and oriented to person, place, and time. She is not disoriented.   Psychiatric: She has a normal mood and affect.   Skin:   Areas Examined (abnormalities noted in diagram):   Scalp / Hair Palpated and Inspected  Head / Face Inspection Performed  Neck Inspection Performed  Chest / Axilla Inspection Performed  Abdomen Inspection Performed  Genitals / Buttocks / Groin Inspection Performed  Back Inspection Performed  RUE Inspected  LUE Inspection Performed  RLE Inspected  LLE Inspection Performed  Nails and Digits Inspection Performed                       Diagram Legend     Erythematous scaling macule/papule c/w actinic keratosis       Vascular papule c/w angioma      Pigmented verrucoid papule/plaque c/w seborrheic keratosis      Yellow umbilicated papule c/w sebaceous hyperplasia      Irregularly shaped tan macule c/w lentigo     1-2 mm smooth white papules consistent with Milia      Movable subcutaneous cyst with punctum c/w epidermal inclusion cyst       Subcutaneous movable cyst c/w pilar cyst      Firm pink to brown papule c/w dermatofibroma      Pedunculated fleshy papule(s) c/w skin tag(s)      Evenly pigmented macule c/w junctional nevus     Mildly variegated pigmented, slightly irregular-bordered macule c/w mildly atypical nevus      Flesh colored to evenly pigmented papule c/w intradermal nevus       Pink pearly papule/plaque c/w basal cell carcinoma      Erythematous hyperkeratotic cursted plaque c/w SCC      Surgical scar with no sign of skin cancer recurrence      Open and closed comedones      Inflammatory papules and pustules      Verrucoid papule consistent consistent with wart     Erythematous eczematous patches and plaques     Dystrophic onycholytic nail with subungual debris c/w onychomycosis     Umbilicated papule    Erythematous-base heme-crusted tan verrucoid plaque consistent with inflamed seborrheic keratosis     Erythematous Silvery Scaling Plaque c/w Psoriasis     See annotation              Assessment / Plan:      Pathology Orders:     Normal Orders This Visit    Specimen to Pathology, Dermatology     Questions:    Procedure Type: Dermatology and skin neoplasms    Number of Specimens: 1    ------------------------: -------------------------    Spec 1 Procedure: Biopsy    Spec 1 Clinical Impression: r/o atypical nevus    Spec 1 Source: mid upper back        Neoplasm of uncertain behavior of skin  -     Specimen to Pathology, Dermatology    Shave biopsy procedure note:    Shave biopsy performed after verbal consent including risk of infection, scar, recurrence, need for additional treatment of site. Area prepped with alcohol, anesthetized with approximately 1.0cc of 1% lidocaine with epinephrine. Lesional tissue shaved with razor blade. Hemostasis achieved with application of aluminum chloride followed by hyfrecation. No complications. Dressing applied. Wound care explained.        Multiple benign nevi  total body skin examination performed  today including at least 12 points as noted in physical examination. lesions suspicious for malignancy noted.  Reassurance provided.  Instructed patient to observe lesion(s) for changes and follow up in clinic if changes are noted. Discussed ABCDE's of moles and brochure provided.      Screening exam for skin cancer  Total body skin examination performed today including at least 12 points as noted in physical examination. Suspicious lesions noted.               Follow up in about 1 year (around 11/23/2021).

## 2020-11-23 NOTE — PATIENT INSTRUCTIONS
Shave Biopsy Wound Care    Your doctor has performed a shave biopsy today.  A band aid and vaseline ointment has been placed over the site.  This should remain in place for 24 hours.  It is recommended that you keep the area dry for the first 24 hours.  After 24 hours, you may remove the band aid and wash the area with warm soap and water and apply Vaseline jelly.  Many patients prefer to use Neosporin or Bacitracin ointment.  This is acceptable; however, know that you can develop an allergy to this medication even if you have used it safely for years.  It is important to keep the area moist.  Letting it dry out and get air slows healing time, and will worsen the scar.  Band aid is optional after first 24 hours.      If you notice increasing redness, tenderness, pain, or yellow drainage at the biopsy site, please notify your doctor.  These are signs of an infection.    If your biopsy site is bleeding, apply firm pressure for 15 minutes straight.  Repeat for another 15 minutes, if it is still bleeding.   If the surgical site continues to bleed, then please contact your doctor.      For [facility] users:   You will receive your biopsy results in [facility] as soon as they are available. Please be assured that your physician/provider will review your results and will then determine what further treatment, evaluation, or planning is required. You should be contacted by your physician's/provider's office within 5 business days of receiving your results; If not, please reach out to directly. This is one more way [facility] is putting you first.     [address] [place] [address]/ [phone] FAX/ [email or web address]

## 2020-11-24 RX ORDER — POTASSIUM CHLORIDE 20 MEQ/1
TABLET, EXTENDED RELEASE ORAL
Qty: 90 TABLET | Refills: 33 | Status: SHIPPED | OUTPATIENT
Start: 2020-11-24 | End: 2022-02-03

## 2020-12-02 LAB
FINAL PATHOLOGIC DIAGNOSIS: NORMAL
GROSS: NORMAL

## 2021-01-06 ENCOUNTER — TELEPHONE (OUTPATIENT)
Dept: DERMATOLOGY | Facility: CLINIC | Age: 41
End: 2021-01-06

## 2021-01-07 ENCOUNTER — DOCUMENTATION ONLY (OUTPATIENT)
Dept: CARDIOLOGY | Facility: HOSPITAL | Age: 41
End: 2021-01-07

## 2021-01-07 ENCOUNTER — PROCEDURE VISIT (OUTPATIENT)
Dept: DERMATOLOGY | Facility: CLINIC | Age: 41
End: 2021-01-07
Payer: COMMERCIAL

## 2021-01-07 VITALS
BODY MASS INDEX: 21.16 KG/M2 | HEART RATE: 68 BPM | SYSTOLIC BLOOD PRESSURE: 137 MMHG | DIASTOLIC BLOOD PRESSURE: 72 MMHG | WEIGHT: 115 LBS | HEIGHT: 62 IN

## 2021-01-07 DIAGNOSIS — D22.5 ATYPICAL NEVUS OF BACK: Primary | ICD-10-CM

## 2021-01-07 PROCEDURE — 88305 TISSUE EXAM BY PATHOLOGIST: ICD-10-PCS | Mod: 26,,, | Performed by: PATHOLOGY

## 2021-01-07 PROCEDURE — 11402 EXC TR-EXT B9+MARG 1.1-2 CM: CPT | Mod: S$GLB,,, | Performed by: DERMATOLOGY

## 2021-01-07 PROCEDURE — 88305 TISSUE EXAM BY PATHOLOGIST: CPT | Performed by: PATHOLOGY

## 2021-01-07 PROCEDURE — 11402 PR EXC SKIN BENIG 1.1-2 CM TRUNK,ARM,LEG: ICD-10-PCS | Mod: S$GLB,,, | Performed by: DERMATOLOGY

## 2021-01-07 PROCEDURE — 13101 CMPLX RPR TRUNK 2.6-7.5 CM: CPT | Mod: 51,S$GLB,, | Performed by: DERMATOLOGY

## 2021-01-07 PROCEDURE — 99499 UNLISTED E&M SERVICE: CPT | Mod: S$GLB,,, | Performed by: DERMATOLOGY

## 2021-01-07 PROCEDURE — 13101 PR RECMPL WND TRUNK 2.6-7.5 CM: ICD-10-PCS | Mod: 51,S$GLB,, | Performed by: DERMATOLOGY

## 2021-01-07 PROCEDURE — 88305 TISSUE EXAM BY PATHOLOGIST: CPT | Mod: 26,,, | Performed by: PATHOLOGY

## 2021-01-07 PROCEDURE — 99499 NO LOS: ICD-10-PCS | Mod: S$GLB,,, | Performed by: DERMATOLOGY

## 2021-01-07 RX ORDER — HYDROCODONE BITARTRATE AND ACETAMINOPHEN 5; 325 MG/1; MG/1
1 TABLET ORAL EVERY 6 HOURS PRN
Qty: 10 TABLET | Refills: 0 | Status: SHIPPED | OUTPATIENT
Start: 2021-01-07 | End: 2021-03-15 | Stop reason: ALTCHOICE

## 2021-01-12 LAB
FINAL PATHOLOGIC DIAGNOSIS: NORMAL
GROSS: NORMAL
MICROSCOPIC EXAM: NORMAL

## 2021-01-20 ENCOUNTER — CLINICAL SUPPORT (OUTPATIENT)
Dept: CARDIOLOGY | Facility: HOSPITAL | Age: 41
End: 2021-01-20
Payer: COMMERCIAL

## 2021-01-20 DIAGNOSIS — Z95.810 PRESENCE OF AUTOMATIC (IMPLANTABLE) CARDIAC DEFIBRILLATOR: ICD-10-CM

## 2021-01-20 PROCEDURE — 93295 CARDIAC DEVICE CHECK - REMOTE: ICD-10-PCS | Mod: ,,, | Performed by: INTERNAL MEDICINE

## 2021-01-20 PROCEDURE — 93296 REM INTERROG EVL PM/IDS: CPT | Performed by: INTERNAL MEDICINE

## 2021-01-20 PROCEDURE — 93295 DEV INTERROG REMOTE 1/2/MLT: CPT | Mod: ,,, | Performed by: INTERNAL MEDICINE

## 2021-01-21 ENCOUNTER — OFFICE VISIT (OUTPATIENT)
Dept: DERMATOLOGY | Facility: CLINIC | Age: 41
End: 2021-01-21
Payer: COMMERCIAL

## 2021-01-21 DIAGNOSIS — Z09 POSTOP CHECK: Primary | ICD-10-CM

## 2021-01-21 PROCEDURE — 99999 PR PBB SHADOW E&M-EST. PATIENT-LVL I: ICD-10-PCS | Mod: PBBFAC,,, | Performed by: DERMATOLOGY

## 2021-01-21 PROCEDURE — 1126F AMNT PAIN NOTED NONE PRSNT: CPT | Mod: S$GLB,,, | Performed by: DERMATOLOGY

## 2021-01-21 PROCEDURE — 1126F PR PAIN SEVERITY QUANTIFIED, NO PAIN PRESENT: ICD-10-PCS | Mod: S$GLB,,, | Performed by: DERMATOLOGY

## 2021-01-21 PROCEDURE — 99999 PR PBB SHADOW E&M-EST. PATIENT-LVL I: CPT | Mod: PBBFAC,,, | Performed by: DERMATOLOGY

## 2021-01-21 PROCEDURE — 99024 PR POST-OP FOLLOW-UP VISIT: ICD-10-PCS | Mod: S$GLB,,, | Performed by: DERMATOLOGY

## 2021-01-21 PROCEDURE — 99024 POSTOP FOLLOW-UP VISIT: CPT | Mod: S$GLB,,, | Performed by: DERMATOLOGY

## 2021-01-27 DIAGNOSIS — I49.8 OTHER SPECIFIED CARDIAC ARRHYTHMIAS: Primary | ICD-10-CM

## 2021-02-28 ENCOUNTER — HOSPITAL ENCOUNTER (INPATIENT)
Facility: HOSPITAL | Age: 41
LOS: 2 days | Discharge: HOME | DRG: 812 | End: 2021-03-02
Attending: EMERGENCY MEDICINE | Admitting: INTERNAL MEDICINE
Payer: COMMERCIAL

## 2021-02-28 ENCOUNTER — PATIENT MESSAGE (OUTPATIENT)
Dept: INTERNAL MEDICINE | Facility: CLINIC | Age: 41
End: 2021-02-28

## 2021-02-28 DIAGNOSIS — D64.9 ANEMIA: Primary | ICD-10-CM

## 2021-02-28 DIAGNOSIS — K92.2 GASTROINTESTINAL HEMORRHAGE, UNSPECIFIED GASTROINTESTINAL HEMORRHAGE TYPE: ICD-10-CM

## 2021-02-28 DIAGNOSIS — R07.9 CHEST PAIN: ICD-10-CM

## 2021-02-28 PROBLEM — R55 NEAR SYNCOPE: Status: ACTIVE | Noted: 2021-02-28

## 2021-02-28 LAB
ABO + RH BLD: NORMAL
ALBUMIN SERPL BCP-MCNC: 3.8 G/DL (ref 3.5–5.2)
ALP SERPL-CCNC: 51 U/L (ref 55–135)
ALT SERPL W/O P-5'-P-CCNC: 11 U/L (ref 10–44)
ANION GAP SERPL CALC-SCNC: 8 MMOL/L (ref 8–16)
APTT BLDCRRT: 22 SEC (ref 21–32)
AST SERPL-CCNC: 15 U/L (ref 10–40)
BASOPHILS # BLD AUTO: 0.06 K/UL (ref 0–0.2)
BASOPHILS NFR BLD: 0.8 % (ref 0–1.9)
BILIRUB SERPL-MCNC: 0.4 MG/DL (ref 0.1–1)
BLD GP AB SCN CELLS X3 SERPL QL: NORMAL
BUN SERPL-MCNC: 18 MG/DL (ref 6–20)
CALCIUM SERPL-MCNC: 8.6 MG/DL (ref 8.7–10.5)
CHLORIDE SERPL-SCNC: 103 MMOL/L (ref 95–110)
CO2 SERPL-SCNC: 25 MMOL/L (ref 23–29)
CREAT SERPL-MCNC: 0.7 MG/DL (ref 0.5–1.4)
CTP QC/QA: YES
DIFFERENTIAL METHOD: ABNORMAL
EOSINOPHIL # BLD AUTO: 0.3 K/UL (ref 0–0.5)
EOSINOPHIL NFR BLD: 3.8 % (ref 0–8)
ERYTHROCYTE [DISTWIDTH] IN BLOOD BY AUTOMATED COUNT: 11.7 % (ref 11.5–14.5)
EST. GFR  (AFRICAN AMERICAN): >60 ML/MIN/1.73 M^2
EST. GFR  (NON AFRICAN AMERICAN): >60 ML/MIN/1.73 M^2
FERRITIN SERPL-MCNC: 64 NG/ML (ref 20–300)
FOLATE SERPL-MCNC: 7.7 NG/ML (ref 4–24)
GLUCOSE SERPL-MCNC: 143 MG/DL (ref 70–110)
HCT VFR BLD AUTO: 26.2 % (ref 37–48.5)
HGB BLD-MCNC: 8.8 G/DL (ref 12–16)
IMM GRANULOCYTES # BLD AUTO: 0.02 K/UL (ref 0–0.04)
IMM GRANULOCYTES NFR BLD AUTO: 0.3 % (ref 0–0.5)
INR PPP: 1 (ref 0.8–1.2)
IRON SERPL-MCNC: 67 UG/DL (ref 30–160)
LYMPHOCYTES # BLD AUTO: 1.6 K/UL (ref 1–4.8)
LYMPHOCYTES NFR BLD: 21.6 % (ref 18–48)
MCH RBC QN AUTO: 34.2 PG (ref 27–31)
MCHC RBC AUTO-ENTMCNC: 33.6 G/DL (ref 32–36)
MCV RBC AUTO: 102 FL (ref 82–98)
MONOCYTES # BLD AUTO: 0.5 K/UL (ref 0.3–1)
MONOCYTES NFR BLD: 7 % (ref 4–15)
NEUTROPHILS # BLD AUTO: 5 K/UL (ref 1.8–7.7)
NEUTROPHILS NFR BLD: 66.5 % (ref 38–73)
NRBC BLD-RTO: 0 /100 WBC
PLATELET # BLD AUTO: 142 K/UL (ref 150–350)
PMV BLD AUTO: 11.1 FL (ref 9.2–12.9)
POTASSIUM SERPL-SCNC: 3.6 MMOL/L (ref 3.5–5.1)
PROT SERPL-MCNC: 6.5 G/DL (ref 6–8.4)
PROTHROMBIN TIME: 11.1 SEC (ref 9–12.5)
RBC # BLD AUTO: 2.57 M/UL (ref 4–5.4)
RETICS/RBC NFR AUTO: 2.2 % (ref 0.5–2.5)
SARS-COV-2 RDRP RESP QL NAA+PROBE: NEGATIVE
SATURATED IRON: 20 % (ref 20–50)
SODIUM SERPL-SCNC: 136 MMOL/L (ref 136–145)
TOTAL IRON BINDING CAPACITY: 339 UG/DL (ref 250–450)
TRANSFERRIN SERPL-MCNC: 229 MG/DL (ref 200–375)
VIT B12 SERPL-MCNC: 537 PG/ML (ref 210–950)
WBC # BLD AUTO: 7.55 K/UL (ref 3.9–12.7)

## 2021-02-28 PROCEDURE — 82607 VITAMIN B-12: CPT

## 2021-02-28 PROCEDURE — 93010 EKG 12-LEAD: ICD-10-PCS | Mod: ,,, | Performed by: INTERNAL MEDICINE

## 2021-02-28 PROCEDURE — 99285 EMERGENCY DEPT VISIT HI MDM: CPT | Mod: 25

## 2021-02-28 PROCEDURE — 85610 PROTHROMBIN TIME: CPT

## 2021-02-28 PROCEDURE — 85045 AUTOMATED RETICULOCYTE COUNT: CPT

## 2021-02-28 PROCEDURE — 86900 BLOOD TYPING SEROLOGIC ABO: CPT

## 2021-02-28 PROCEDURE — 80053 COMPREHEN METABOLIC PANEL: CPT

## 2021-02-28 PROCEDURE — 93010 ELECTROCARDIOGRAM REPORT: CPT | Mod: ,,, | Performed by: INTERNAL MEDICINE

## 2021-02-28 PROCEDURE — 85025 COMPLETE CBC W/AUTO DIFF WBC: CPT

## 2021-02-28 PROCEDURE — 86803 HEPATITIS C AB TEST: CPT

## 2021-02-28 PROCEDURE — 86920 COMPATIBILITY TEST SPIN: CPT

## 2021-02-28 PROCEDURE — 11000001 HC ACUTE MED/SURG PRIVATE ROOM

## 2021-02-28 PROCEDURE — 82746 ASSAY OF FOLIC ACID SERUM: CPT

## 2021-02-28 PROCEDURE — 99223 PR INITIAL HOSPITAL CARE,LEVL III: ICD-10-PCS | Mod: ,,, | Performed by: INTERNAL MEDICINE

## 2021-02-28 PROCEDURE — 85730 THROMBOPLASTIN TIME PARTIAL: CPT

## 2021-02-28 PROCEDURE — 93005 ELECTROCARDIOGRAM TRACING: CPT

## 2021-02-28 PROCEDURE — 99223 1ST HOSP IP/OBS HIGH 75: CPT | Mod: ,,, | Performed by: INTERNAL MEDICINE

## 2021-02-28 PROCEDURE — U0002 COVID-19 LAB TEST NON-CDC: HCPCS | Performed by: PHYSICIAN ASSISTANT

## 2021-02-28 PROCEDURE — 86703 HIV-1/HIV-2 1 RESULT ANTBDY: CPT | Performed by: EMERGENCY MEDICINE

## 2021-02-28 PROCEDURE — 82728 ASSAY OF FERRITIN: CPT

## 2021-02-28 PROCEDURE — 99285 EMERGENCY DEPT VISIT HI MDM: CPT | Mod: CS,,, | Performed by: PHYSICIAN ASSISTANT

## 2021-02-28 PROCEDURE — 99285 PR EMERGENCY DEPT VISIT,LEVEL V: ICD-10-PCS | Mod: CS,,, | Performed by: PHYSICIAN ASSISTANT

## 2021-02-28 PROCEDURE — 83540 ASSAY OF IRON: CPT

## 2021-02-28 PROCEDURE — 63600175 PHARM REV CODE 636 W HCPCS: Performed by: STUDENT IN AN ORGANIZED HEALTH CARE EDUCATION/TRAINING PROGRAM

## 2021-02-28 RX ORDER — SODIUM CHLORIDE, SODIUM LACTATE, POTASSIUM CHLORIDE, CALCIUM CHLORIDE 600; 310; 30; 20 MG/100ML; MG/100ML; MG/100ML; MG/100ML
INJECTION, SOLUTION INTRAVENOUS CONTINUOUS
Status: ACTIVE | OUTPATIENT
Start: 2021-02-28 | End: 2021-03-01

## 2021-02-28 RX ORDER — PANTOPRAZOLE SODIUM 40 MG/10ML
40 INJECTION, POWDER, LYOPHILIZED, FOR SOLUTION INTRAVENOUS EVERY 12 HOURS
Status: DISCONTINUED | OUTPATIENT
Start: 2021-02-28 | End: 2021-03-02 | Stop reason: HOSPADM

## 2021-02-28 RX ORDER — FERROUS SULFATE 324(65)MG
324 TABLET, DELAYED RELEASE (ENTERIC COATED) ORAL DAILY
COMMUNITY
End: 2024-03-26

## 2021-02-28 RX ORDER — ACETAMINOPHEN 325 MG/1
650 TABLET ORAL EVERY 4 HOURS PRN
Status: DISCONTINUED | OUTPATIENT
Start: 2021-02-28 | End: 2021-03-02 | Stop reason: HOSPADM

## 2021-02-28 RX ORDER — GLUCAGON 1 MG
1 KIT INJECTION
Status: DISCONTINUED | OUTPATIENT
Start: 2021-02-28 | End: 2021-03-02 | Stop reason: HOSPADM

## 2021-02-28 RX ORDER — METOPROLOL SUCCINATE 50 MG/1
50 TABLET, EXTENDED RELEASE ORAL DAILY
Status: DISCONTINUED | OUTPATIENT
Start: 2021-03-01 | End: 2021-03-02 | Stop reason: HOSPADM

## 2021-02-28 RX ORDER — IBUPROFEN 200 MG
16 TABLET ORAL
Status: DISCONTINUED | OUTPATIENT
Start: 2021-02-28 | End: 2021-03-02 | Stop reason: HOSPADM

## 2021-02-28 RX ORDER — IBUPROFEN 200 MG
24 TABLET ORAL
Status: DISCONTINUED | OUTPATIENT
Start: 2021-02-28 | End: 2021-03-02 | Stop reason: HOSPADM

## 2021-02-28 RX ORDER — SODIUM CHLORIDE 0.9 % (FLUSH) 0.9 %
10 SYRINGE (ML) INJECTION
Status: DISCONTINUED | OUTPATIENT
Start: 2021-02-28 | End: 2021-03-02 | Stop reason: HOSPADM

## 2021-02-28 RX ADMIN — SODIUM CHLORIDE, SODIUM LACTATE, POTASSIUM CHLORIDE, AND CALCIUM CHLORIDE: 600; 310; 30; 20 INJECTION, SOLUTION INTRAVENOUS at 09:02

## 2021-03-01 ENCOUNTER — ANESTHESIA EVENT (OUTPATIENT)
Dept: ENDOSCOPY | Facility: HOSPITAL | Age: 41
DRG: 812 | End: 2021-03-01
Payer: COMMERCIAL

## 2021-03-01 LAB
ALBUMIN SERPL BCP-MCNC: 3.2 G/DL (ref 3.5–5.2)
ALP SERPL-CCNC: 40 U/L (ref 55–135)
ALT SERPL W/O P-5'-P-CCNC: 10 U/L (ref 10–44)
ANION GAP SERPL CALC-SCNC: 9 MMOL/L (ref 8–16)
AST SERPL-CCNC: 12 U/L (ref 10–40)
B-HCG UR QL: NEGATIVE
B-HCG UR QL: NEGATIVE
BASOPHILS # BLD AUTO: 0.03 K/UL (ref 0–0.2)
BASOPHILS # BLD AUTO: 0.04 K/UL (ref 0–0.2)
BASOPHILS NFR BLD: 0.5 % (ref 0–1.9)
BASOPHILS NFR BLD: 0.7 % (ref 0–1.9)
BASOPHILS NFR BLD: 1 % (ref 0–1.9)
BASOPHILS NFR BLD: 1.1 % (ref 0–1.9)
BILIRUB SERPL-MCNC: 0.2 MG/DL (ref 0.1–1)
BLD PROD TYP BPU: NORMAL
BLOOD UNIT EXPIRATION DATE: NORMAL
BLOOD UNIT TYPE CODE: 6200
BLOOD UNIT TYPE: NORMAL
BUN SERPL-MCNC: 11 MG/DL (ref 6–20)
CALCIUM SERPL-MCNC: 7.7 MG/DL (ref 8.7–10.5)
CHLORIDE SERPL-SCNC: 106 MMOL/L (ref 95–110)
CO2 SERPL-SCNC: 21 MMOL/L (ref 23–29)
CODING SYSTEM: NORMAL
CREAT SERPL-MCNC: 0.6 MG/DL (ref 0.5–1.4)
DIFFERENTIAL METHOD: ABNORMAL
DISPENSE STATUS: NORMAL
EOSINOPHIL # BLD AUTO: 0.2 K/UL (ref 0–0.5)
EOSINOPHIL # BLD AUTO: 0.3 K/UL (ref 0–0.5)
EOSINOPHIL NFR BLD: 4.1 % (ref 0–8)
EOSINOPHIL NFR BLD: 4.9 % (ref 0–8)
EOSINOPHIL NFR BLD: 5 % (ref 0–8)
EOSINOPHIL NFR BLD: 7.1 % (ref 0–8)
ERYTHROCYTE [DISTWIDTH] IN BLOOD BY AUTOMATED COUNT: 11.7 % (ref 11.5–14.5)
ERYTHROCYTE [DISTWIDTH] IN BLOOD BY AUTOMATED COUNT: 11.9 % (ref 11.5–14.5)
ERYTHROCYTE [DISTWIDTH] IN BLOOD BY AUTOMATED COUNT: 11.9 % (ref 11.5–14.5)
ERYTHROCYTE [DISTWIDTH] IN BLOOD BY AUTOMATED COUNT: 14.6 % (ref 11.5–14.5)
EST. GFR  (AFRICAN AMERICAN): >60 ML/MIN/1.73 M^2
EST. GFR  (NON AFRICAN AMERICAN): >60 ML/MIN/1.73 M^2
GLUCOSE SERPL-MCNC: 98 MG/DL (ref 70–110)
HCT VFR BLD AUTO: 20.7 % (ref 37–48.5)
HCT VFR BLD AUTO: 22 % (ref 37–48.5)
HCT VFR BLD AUTO: 23.9 % (ref 37–48.5)
HCT VFR BLD AUTO: 25.1 % (ref 37–48.5)
HCV AB SERPL QL IA: NEGATIVE
HGB BLD-MCNC: 6.8 G/DL (ref 12–16)
HGB BLD-MCNC: 7.4 G/DL (ref 12–16)
HGB BLD-MCNC: 8.2 G/DL (ref 12–16)
HGB BLD-MCNC: 8.6 G/DL (ref 12–16)
IMM GRANULOCYTES # BLD AUTO: 0.01 K/UL (ref 0–0.04)
IMM GRANULOCYTES # BLD AUTO: 0.01 K/UL (ref 0–0.04)
IMM GRANULOCYTES # BLD AUTO: 0.02 K/UL (ref 0–0.04)
IMM GRANULOCYTES # BLD AUTO: 0.02 K/UL (ref 0–0.04)
IMM GRANULOCYTES NFR BLD AUTO: 0.2 % (ref 0–0.5)
IMM GRANULOCYTES NFR BLD AUTO: 0.3 % (ref 0–0.5)
IMM GRANULOCYTES NFR BLD AUTO: 0.3 % (ref 0–0.5)
IMM GRANULOCYTES NFR BLD AUTO: 0.5 % (ref 0–0.5)
LYMPHOCYTES # BLD AUTO: 1 K/UL (ref 1–4.8)
LYMPHOCYTES # BLD AUTO: 1.1 K/UL (ref 1–4.8)
LYMPHOCYTES # BLD AUTO: 1.4 K/UL (ref 1–4.8)
LYMPHOCYTES # BLD AUTO: 1.7 K/UL (ref 1–4.8)
LYMPHOCYTES NFR BLD: 21 % (ref 18–48)
LYMPHOCYTES NFR BLD: 24.2 % (ref 18–48)
LYMPHOCYTES NFR BLD: 30.8 % (ref 18–48)
LYMPHOCYTES NFR BLD: 31.9 % (ref 18–48)
MAGNESIUM SERPL-MCNC: 1.7 MG/DL (ref 1.6–2.6)
MCH RBC QN AUTO: 33 PG (ref 27–31)
MCH RBC QN AUTO: 33.7 PG (ref 27–31)
MCH RBC QN AUTO: 34.1 PG (ref 27–31)
MCH RBC QN AUTO: 34.7 PG (ref 27–31)
MCHC RBC AUTO-ENTMCNC: 32.9 G/DL (ref 32–36)
MCHC RBC AUTO-ENTMCNC: 33.6 G/DL (ref 32–36)
MCHC RBC AUTO-ENTMCNC: 34.3 G/DL (ref 32–36)
MCHC RBC AUTO-ENTMCNC: 34.3 G/DL (ref 32–36)
MCV RBC AUTO: 101 FL (ref 82–98)
MCV RBC AUTO: 101 FL (ref 82–98)
MCV RBC AUTO: 103 FL (ref 82–98)
MCV RBC AUTO: 96 FL (ref 82–98)
MONOCYTES # BLD AUTO: 0.4 K/UL (ref 0.3–1)
MONOCYTES # BLD AUTO: 0.4 K/UL (ref 0.3–1)
MONOCYTES # BLD AUTO: 0.5 K/UL (ref 0.3–1)
MONOCYTES # BLD AUTO: 0.6 K/UL (ref 0.3–1)
MONOCYTES NFR BLD: 10.8 % (ref 4–15)
MONOCYTES NFR BLD: 10.9 % (ref 4–15)
MONOCYTES NFR BLD: 8 % (ref 4–15)
MONOCYTES NFR BLD: 9.3 % (ref 4–15)
NEUTROPHILS # BLD AUTO: 1.7 K/UL (ref 1.8–7.7)
NEUTROPHILS # BLD AUTO: 2.5 K/UL (ref 1.8–7.7)
NEUTROPHILS # BLD AUTO: 2.8 K/UL (ref 1.8–7.7)
NEUTROPHILS # BLD AUTO: 4.4 K/UL (ref 1.8–7.7)
NEUTROPHILS NFR BLD: 48.8 % (ref 38–73)
NEUTROPHILS NFR BLD: 52.4 % (ref 38–73)
NEUTROPHILS NFR BLD: 60.1 % (ref 38–73)
NEUTROPHILS NFR BLD: 66.1 % (ref 38–73)
NRBC BLD-RTO: 0 /100 WBC
PHOSPHATE SERPL-MCNC: 3.6 MG/DL (ref 2.7–4.5)
PLATELET # BLD AUTO: 113 K/UL (ref 150–350)
PLATELET # BLD AUTO: 123 K/UL (ref 150–350)
PLATELET # BLD AUTO: 125 K/UL (ref 150–350)
PLATELET # BLD AUTO: 130 K/UL (ref 150–350)
PMV BLD AUTO: 11.4 FL (ref 9.2–12.9)
PMV BLD AUTO: 11.5 FL (ref 9.2–12.9)
POTASSIUM SERPL-SCNC: 3.6 MMOL/L (ref 3.5–5.1)
PROT SERPL-MCNC: 5.3 G/DL (ref 6–8.4)
RBC # BLD AUTO: 2.02 M/UL (ref 4–5.4)
RBC # BLD AUTO: 2.17 M/UL (ref 4–5.4)
RBC # BLD AUTO: 2.36 M/UL (ref 4–5.4)
RBC # BLD AUTO: 2.61 M/UL (ref 4–5.4)
SODIUM SERPL-SCNC: 136 MMOL/L (ref 136–145)
TRANS ERYTHROCYTES VOL PATIENT: NORMAL ML
TSH SERPL DL<=0.005 MIU/L-ACNC: 2.56 UIU/ML (ref 0.4–4)
WBC # BLD AUTO: 3.51 K/UL (ref 3.9–12.7)
WBC # BLD AUTO: 4.09 K/UL (ref 3.9–12.7)
WBC # BLD AUTO: 5.42 K/UL (ref 3.9–12.7)
WBC # BLD AUTO: 6.63 K/UL (ref 3.9–12.7)

## 2021-03-01 PROCEDURE — 81025 URINE PREGNANCY TEST: CPT

## 2021-03-01 PROCEDURE — 99223 PR INITIAL HOSPITAL CARE,LEVL III: ICD-10-PCS | Mod: ,,, | Performed by: STUDENT IN AN ORGANIZED HEALTH CARE EDUCATION/TRAINING PROGRAM

## 2021-03-01 PROCEDURE — 80053 COMPREHEN METABOLIC PANEL: CPT

## 2021-03-01 PROCEDURE — 25000003 PHARM REV CODE 250: Performed by: STUDENT IN AN ORGANIZED HEALTH CARE EDUCATION/TRAINING PROGRAM

## 2021-03-01 PROCEDURE — 84100 ASSAY OF PHOSPHORUS: CPT

## 2021-03-01 PROCEDURE — 11000001 HC ACUTE MED/SURG PRIVATE ROOM

## 2021-03-01 PROCEDURE — C9113 INJ PANTOPRAZOLE SODIUM, VIA: HCPCS | Performed by: STUDENT IN AN ORGANIZED HEALTH CARE EDUCATION/TRAINING PROGRAM

## 2021-03-01 PROCEDURE — 85025 COMPLETE CBC W/AUTO DIFF WBC: CPT | Mod: 91

## 2021-03-01 PROCEDURE — 99233 PR SUBSEQUENT HOSPITAL CARE,LEVL III: ICD-10-PCS | Mod: ,,, | Performed by: INTERNAL MEDICINE

## 2021-03-01 PROCEDURE — 36415 COLL VENOUS BLD VENIPUNCTURE: CPT

## 2021-03-01 PROCEDURE — 84443 ASSAY THYROID STIM HORMONE: CPT

## 2021-03-01 PROCEDURE — 36430 TRANSFUSION BLD/BLD COMPNT: CPT

## 2021-03-01 PROCEDURE — 63600175 PHARM REV CODE 636 W HCPCS: Performed by: STUDENT IN AN ORGANIZED HEALTH CARE EDUCATION/TRAINING PROGRAM

## 2021-03-01 PROCEDURE — 99233 SBSQ HOSP IP/OBS HIGH 50: CPT | Mod: ,,, | Performed by: INTERNAL MEDICINE

## 2021-03-01 PROCEDURE — 83735 ASSAY OF MAGNESIUM: CPT

## 2021-03-01 PROCEDURE — P9021 RED BLOOD CELLS UNIT: HCPCS

## 2021-03-01 PROCEDURE — 99223 1ST HOSP IP/OBS HIGH 75: CPT | Mod: ,,, | Performed by: STUDENT IN AN ORGANIZED HEALTH CARE EDUCATION/TRAINING PROGRAM

## 2021-03-01 RX ORDER — SODIUM CHLORIDE, SODIUM LACTATE, POTASSIUM CHLORIDE, CALCIUM CHLORIDE 600; 310; 30; 20 MG/100ML; MG/100ML; MG/100ML; MG/100ML
INJECTION, SOLUTION INTRAVENOUS CONTINUOUS
Status: ACTIVE | OUTPATIENT
Start: 2021-03-01 | End: 2021-03-02

## 2021-03-01 RX ORDER — HYDROCODONE BITARTRATE AND ACETAMINOPHEN 500; 5 MG/1; MG/1
TABLET ORAL
Status: DISCONTINUED | OUTPATIENT
Start: 2021-03-01 | End: 2021-03-02 | Stop reason: HOSPADM

## 2021-03-01 RX ADMIN — ACETAMINOPHEN 650 MG: 325 TABLET ORAL at 07:03

## 2021-03-01 RX ADMIN — SODIUM CHLORIDE, SODIUM LACTATE, POTASSIUM CHLORIDE, AND CALCIUM CHLORIDE: .6; .31; .03; .02 INJECTION, SOLUTION INTRAVENOUS at 04:03

## 2021-03-01 RX ADMIN — PANTOPRAZOLE SODIUM 40 MG: 40 INJECTION, POWDER, FOR SOLUTION INTRAVENOUS at 12:03

## 2021-03-01 RX ADMIN — PANTOPRAZOLE SODIUM 40 MG: 40 INJECTION, POWDER, FOR SOLUTION INTRAVENOUS at 10:03

## 2021-03-01 RX ADMIN — PANTOPRAZOLE SODIUM 40 MG: 40 INJECTION, POWDER, FOR SOLUTION INTRAVENOUS at 08:03

## 2021-03-01 RX ADMIN — SODIUM CHLORIDE, SODIUM LACTATE, POTASSIUM CHLORIDE, AND CALCIUM CHLORIDE 1000 ML: .6; .31; .03; .02 INJECTION, SOLUTION INTRAVENOUS at 09:03

## 2021-03-02 ENCOUNTER — PATIENT MESSAGE (OUTPATIENT)
Dept: ENDOSCOPY | Facility: HOSPITAL | Age: 41
End: 2021-03-02

## 2021-03-02 ENCOUNTER — ANESTHESIA (OUTPATIENT)
Dept: ENDOSCOPY | Facility: HOSPITAL | Age: 41
DRG: 812 | End: 2021-03-02
Payer: COMMERCIAL

## 2021-03-02 ENCOUNTER — TELEPHONE (OUTPATIENT)
Dept: ENDOSCOPY | Facility: HOSPITAL | Age: 41
End: 2021-03-02

## 2021-03-02 VITALS
RESPIRATION RATE: 16 BRPM | OXYGEN SATURATION: 100 % | DIASTOLIC BLOOD PRESSURE: 61 MMHG | HEART RATE: 63 BPM | BODY MASS INDEX: 20.48 KG/M2 | HEIGHT: 62 IN | WEIGHT: 111.31 LBS | SYSTOLIC BLOOD PRESSURE: 106 MMHG | TEMPERATURE: 97 F

## 2021-03-02 DIAGNOSIS — Z01.818 PRE-OP TESTING: Primary | ICD-10-CM

## 2021-03-02 DIAGNOSIS — Z12.11 COLON CANCER SCREENING: Primary | ICD-10-CM

## 2021-03-02 DIAGNOSIS — D64.9 ANEMIA, UNSPECIFIED TYPE: Primary | ICD-10-CM

## 2021-03-02 LAB
ALBUMIN SERPL BCP-MCNC: 3.2 G/DL (ref 3.5–5.2)
ALP SERPL-CCNC: 36 U/L (ref 55–135)
ALT SERPL W/O P-5'-P-CCNC: <5 U/L (ref 10–44)
ANION GAP SERPL CALC-SCNC: 6 MMOL/L (ref 8–16)
AST SERPL-CCNC: 10 U/L (ref 10–40)
BASOPHILS # BLD AUTO: 0.03 K/UL (ref 0–0.2)
BASOPHILS # BLD AUTO: 0.04 K/UL (ref 0–0.2)
BASOPHILS NFR BLD: 0.7 % (ref 0–1.9)
BASOPHILS NFR BLD: 0.7 % (ref 0–1.9)
BILIRUB SERPL-MCNC: 0.5 MG/DL (ref 0.1–1)
BUN SERPL-MCNC: 5 MG/DL (ref 6–20)
CALCIUM SERPL-MCNC: 8.3 MG/DL (ref 8.7–10.5)
CHLORIDE SERPL-SCNC: 105 MMOL/L (ref 95–110)
CO2 SERPL-SCNC: 25 MMOL/L (ref 23–29)
CREAT SERPL-MCNC: 0.4 MG/DL (ref 0.5–1.4)
DIFFERENTIAL METHOD: ABNORMAL
DIFFERENTIAL METHOD: ABNORMAL
EOSINOPHIL # BLD AUTO: 0.3 K/UL (ref 0–0.5)
EOSINOPHIL # BLD AUTO: 0.3 K/UL (ref 0–0.5)
EOSINOPHIL NFR BLD: 5.5 % (ref 0–8)
EOSINOPHIL NFR BLD: 7.3 % (ref 0–8)
ERYTHROCYTE [DISTWIDTH] IN BLOOD BY AUTOMATED COUNT: 14.9 % (ref 11.5–14.5)
ERYTHROCYTE [DISTWIDTH] IN BLOOD BY AUTOMATED COUNT: 14.9 % (ref 11.5–14.5)
EST. GFR  (AFRICAN AMERICAN): >60 ML/MIN/1.73 M^2
EST. GFR  (NON AFRICAN AMERICAN): >60 ML/MIN/1.73 M^2
GLUCOSE SERPL-MCNC: 96 MG/DL (ref 70–110)
HCT VFR BLD AUTO: 24.2 % (ref 37–48.5)
HCT VFR BLD AUTO: 24.8 % (ref 37–48.5)
HGB BLD-MCNC: 8.2 G/DL (ref 12–16)
HGB BLD-MCNC: 8.3 G/DL (ref 12–16)
IMM GRANULOCYTES # BLD AUTO: 0.01 K/UL (ref 0–0.04)
IMM GRANULOCYTES # BLD AUTO: 0.02 K/UL (ref 0–0.04)
IMM GRANULOCYTES NFR BLD AUTO: 0.2 % (ref 0–0.5)
IMM GRANULOCYTES NFR BLD AUTO: 0.5 % (ref 0–0.5)
LYMPHOCYTES # BLD AUTO: 1.3 K/UL (ref 1–4.8)
LYMPHOCYTES # BLD AUTO: 1.7 K/UL (ref 1–4.8)
LYMPHOCYTES NFR BLD: 30.9 % (ref 18–48)
LYMPHOCYTES NFR BLD: 30.9 % (ref 18–48)
MCH RBC QN AUTO: 32.9 PG (ref 27–31)
MCH RBC QN AUTO: 33.1 PG (ref 27–31)
MCHC RBC AUTO-ENTMCNC: 33.5 G/DL (ref 32–36)
MCHC RBC AUTO-ENTMCNC: 33.9 G/DL (ref 32–36)
MCV RBC AUTO: 98 FL (ref 82–98)
MCV RBC AUTO: 98 FL (ref 82–98)
MONOCYTES # BLD AUTO: 0.5 K/UL (ref 0.3–1)
MONOCYTES # BLD AUTO: 0.6 K/UL (ref 0.3–1)
MONOCYTES NFR BLD: 10.4 % (ref 4–15)
MONOCYTES NFR BLD: 11.2 % (ref 4–15)
NEUTROPHILS # BLD AUTO: 2.1 K/UL (ref 1.8–7.7)
NEUTROPHILS # BLD AUTO: 2.8 K/UL (ref 1.8–7.7)
NEUTROPHILS NFR BLD: 49.4 % (ref 38–73)
NEUTROPHILS NFR BLD: 52.3 % (ref 38–73)
NRBC BLD-RTO: 0 /100 WBC
NRBC BLD-RTO: 0 /100 WBC
PHOSPHATE SERPL-MCNC: 4.3 MG/DL (ref 2.7–4.5)
PLATELET # BLD AUTO: 108 K/UL (ref 150–350)
PLATELET # BLD AUTO: 113 K/UL (ref 150–350)
PMV BLD AUTO: 11.3 FL (ref 9.2–12.9)
PMV BLD AUTO: 11.6 FL (ref 9.2–12.9)
POTASSIUM SERPL-SCNC: 3.8 MMOL/L (ref 3.5–5.1)
PROT SERPL-MCNC: 5.1 G/DL (ref 6–8.4)
RBC # BLD AUTO: 2.48 M/UL (ref 4–5.4)
RBC # BLD AUTO: 2.52 M/UL (ref 4–5.4)
SODIUM SERPL-SCNC: 136 MMOL/L (ref 136–145)
WBC # BLD AUTO: 4.27 K/UL (ref 3.9–12.7)
WBC # BLD AUTO: 5.41 K/UL (ref 3.9–12.7)

## 2021-03-02 PROCEDURE — 99238 HOSP IP/OBS DSCHRG MGMT 30/<: CPT | Mod: ,,, | Performed by: INTERNAL MEDICINE

## 2021-03-02 PROCEDURE — 25000003 PHARM REV CODE 250: Performed by: NURSE ANESTHETIST, CERTIFIED REGISTERED

## 2021-03-02 PROCEDURE — 63600175 PHARM REV CODE 636 W HCPCS: Performed by: NURSE ANESTHETIST, CERTIFIED REGISTERED

## 2021-03-02 PROCEDURE — C9113 INJ PANTOPRAZOLE SODIUM, VIA: HCPCS | Performed by: STUDENT IN AN ORGANIZED HEALTH CARE EDUCATION/TRAINING PROGRAM

## 2021-03-02 PROCEDURE — 99238 PR HOSPITAL DISCHARGE DAY,<30 MIN: ICD-10-PCS | Mod: ,,, | Performed by: INTERNAL MEDICINE

## 2021-03-02 PROCEDURE — 83735 ASSAY OF MAGNESIUM: CPT

## 2021-03-02 PROCEDURE — D9220A PRA ANESTHESIA: Mod: ANES,,, | Performed by: ANESTHESIOLOGY

## 2021-03-02 PROCEDURE — 43235 PR EGD, FLEX, DIAGNOSTIC: ICD-10-PCS | Mod: ,,, | Performed by: STUDENT IN AN ORGANIZED HEALTH CARE EDUCATION/TRAINING PROGRAM

## 2021-03-02 PROCEDURE — D9220A PRA ANESTHESIA: ICD-10-PCS | Mod: ANES,,, | Performed by: ANESTHESIOLOGY

## 2021-03-02 PROCEDURE — 63600175 PHARM REV CODE 636 W HCPCS: Performed by: STUDENT IN AN ORGANIZED HEALTH CARE EDUCATION/TRAINING PROGRAM

## 2021-03-02 PROCEDURE — D9220A PRA ANESTHESIA: Mod: CRNA,,, | Performed by: NURSE ANESTHETIST, CERTIFIED REGISTERED

## 2021-03-02 PROCEDURE — 94761 N-INVAS EAR/PLS OXIMETRY MLT: CPT

## 2021-03-02 PROCEDURE — 85025 COMPLETE CBC W/AUTO DIFF WBC: CPT | Mod: 91

## 2021-03-02 PROCEDURE — 37000009 HC ANESTHESIA EA ADD 15 MINS: Performed by: STUDENT IN AN ORGANIZED HEALTH CARE EDUCATION/TRAINING PROGRAM

## 2021-03-02 PROCEDURE — 37000008 HC ANESTHESIA 1ST 15 MINUTES: Performed by: STUDENT IN AN ORGANIZED HEALTH CARE EDUCATION/TRAINING PROGRAM

## 2021-03-02 PROCEDURE — 36415 COLL VENOUS BLD VENIPUNCTURE: CPT

## 2021-03-02 PROCEDURE — 84100 ASSAY OF PHOSPHORUS: CPT

## 2021-03-02 PROCEDURE — 43235 EGD DIAGNOSTIC BRUSH WASH: CPT | Performed by: STUDENT IN AN ORGANIZED HEALTH CARE EDUCATION/TRAINING PROGRAM

## 2021-03-02 PROCEDURE — 80053 COMPREHEN METABOLIC PANEL: CPT

## 2021-03-02 PROCEDURE — D9220A PRA ANESTHESIA: ICD-10-PCS | Mod: CRNA,,, | Performed by: NURSE ANESTHETIST, CERTIFIED REGISTERED

## 2021-03-02 PROCEDURE — 43235 EGD DIAGNOSTIC BRUSH WASH: CPT | Mod: ,,, | Performed by: STUDENT IN AN ORGANIZED HEALTH CARE EDUCATION/TRAINING PROGRAM

## 2021-03-02 RX ORDER — FENTANYL CITRATE 50 UG/ML
25 INJECTION, SOLUTION INTRAMUSCULAR; INTRAVENOUS EVERY 5 MIN PRN
Status: DISCONTINUED | OUTPATIENT
Start: 2021-03-02 | End: 2021-03-02 | Stop reason: HOSPADM

## 2021-03-02 RX ORDER — PROPOFOL 10 MG/ML
VIAL (ML) INTRAVENOUS CONTINUOUS PRN
Status: DISCONTINUED | OUTPATIENT
Start: 2021-03-02 | End: 2021-03-02

## 2021-03-02 RX ORDER — LIDOCAINE HYDROCHLORIDE 20 MG/ML
INJECTION INTRAVENOUS
Status: DISCONTINUED | OUTPATIENT
Start: 2021-03-02 | End: 2021-03-02

## 2021-03-02 RX ORDER — SODIUM CHLORIDE 0.9 % (FLUSH) 0.9 %
3 SYRINGE (ML) INJECTION
Status: DISCONTINUED | OUTPATIENT
Start: 2021-03-02 | End: 2021-03-02 | Stop reason: HOSPADM

## 2021-03-02 RX ORDER — SODIUM, POTASSIUM,MAG SULFATES 17.5-3.13G
1 SOLUTION, RECONSTITUTED, ORAL ORAL DAILY
Qty: 1 KIT | Refills: 0 | Status: SHIPPED | OUTPATIENT
Start: 2021-03-02 | End: 2021-03-02

## 2021-03-02 RX ORDER — SODIUM, POTASSIUM,MAG SULFATES 17.5-3.13G
SOLUTION, RECONSTITUTED, ORAL ORAL
Qty: 1 KIT | Refills: 0 | Status: ON HOLD | OUTPATIENT
Start: 2021-03-02 | End: 2021-03-08 | Stop reason: ALTCHOICE

## 2021-03-02 RX ORDER — DIPHENHYDRAMINE HYDROCHLORIDE 50 MG/ML
25 INJECTION INTRAMUSCULAR; INTRAVENOUS EVERY 6 HOURS PRN
Status: DISCONTINUED | OUTPATIENT
Start: 2021-03-02 | End: 2021-03-02 | Stop reason: HOSPADM

## 2021-03-02 RX ORDER — PROPOFOL 10 MG/ML
VIAL (ML) INTRAVENOUS
Status: DISCONTINUED | OUTPATIENT
Start: 2021-03-02 | End: 2021-03-02

## 2021-03-02 RX ADMIN — PROPOFOL 60 MG: 10 INJECTION, EMULSION INTRAVENOUS at 07:03

## 2021-03-02 RX ADMIN — PROPOFOL 200 MCG/KG/MIN: 10 INJECTION, EMULSION INTRAVENOUS at 07:03

## 2021-03-02 RX ADMIN — LIDOCAINE HYDROCHLORIDE 50 MG: 20 INJECTION, SOLUTION INTRAVENOUS at 07:03

## 2021-03-02 RX ADMIN — GLYCOPYRROLATE 0.2 MG: 0.2 INJECTION, SOLUTION INTRAMUSCULAR; INTRAVITREAL at 07:03

## 2021-03-02 RX ADMIN — SODIUM CHLORIDE: 0.9 INJECTION, SOLUTION INTRAVENOUS at 07:03

## 2021-03-02 RX ADMIN — PROPOFOL 100 MG: 10 INJECTION, EMULSION INTRAVENOUS at 07:03

## 2021-03-02 RX ADMIN — PANTOPRAZOLE SODIUM 40 MG: 40 INJECTION, POWDER, FOR SOLUTION INTRAVENOUS at 09:03

## 2021-03-03 ENCOUNTER — TELEPHONE (OUTPATIENT)
Dept: CARDIOLOGY | Facility: HOSPITAL | Age: 41
End: 2021-03-03

## 2021-03-03 ENCOUNTER — PATIENT MESSAGE (OUTPATIENT)
Dept: GASTROENTEROLOGY | Facility: CLINIC | Age: 41
End: 2021-03-03

## 2021-03-03 LAB
HIV 1+2 AB+HIV1 P24 AG SERPL QL IA: NEGATIVE
MAGNESIUM SERPL-MCNC: 1.7 MG/DL (ref 1.6–2.6)

## 2021-03-05 ENCOUNTER — LAB VISIT (OUTPATIENT)
Dept: INTERNAL MEDICINE | Facility: CLINIC | Age: 41
End: 2021-03-05
Payer: COMMERCIAL

## 2021-03-05 DIAGNOSIS — Z01.818 PRE-OP TESTING: ICD-10-CM

## 2021-03-05 PROCEDURE — U0003 INFECTIOUS AGENT DETECTION BY NUCLEIC ACID (DNA OR RNA); SEVERE ACUTE RESPIRATORY SYNDROME CORONAVIRUS 2 (SARS-COV-2) (CORONAVIRUS DISEASE [COVID-19]), AMPLIFIED PROBE TECHNIQUE, MAKING USE OF HIGH THROUGHPUT TECHNOLOGIES AS DESCRIBED BY CMS-2020-01-R: HCPCS | Performed by: FAMILY MEDICINE

## 2021-03-05 PROCEDURE — U0005 INFEC AGEN DETEC AMPLI PROBE: HCPCS | Performed by: FAMILY MEDICINE

## 2021-03-06 LAB — SARS-COV-2 RNA RESP QL NAA+PROBE: NOT DETECTED

## 2021-03-08 ENCOUNTER — HOSPITAL ENCOUNTER (OUTPATIENT)
Facility: HOSPITAL | Age: 41
Discharge: HOME | End: 2021-03-08
Attending: INTERNAL MEDICINE | Admitting: INTERNAL MEDICINE
Payer: COMMERCIAL

## 2021-03-08 ENCOUNTER — ANESTHESIA EVENT (OUTPATIENT)
Dept: ENDOSCOPY | Facility: HOSPITAL | Age: 41
End: 2021-03-08
Payer: COMMERCIAL

## 2021-03-08 ENCOUNTER — TELEPHONE (OUTPATIENT)
Dept: GASTROENTEROLOGY | Facility: CLINIC | Age: 41
End: 2021-03-08

## 2021-03-08 ENCOUNTER — ANESTHESIA (OUTPATIENT)
Dept: ENDOSCOPY | Facility: HOSPITAL | Age: 41
End: 2021-03-08
Payer: COMMERCIAL

## 2021-03-08 VITALS
DIASTOLIC BLOOD PRESSURE: 64 MMHG | OXYGEN SATURATION: 99 % | WEIGHT: 110 LBS | BODY MASS INDEX: 20.24 KG/M2 | HEART RATE: 61 BPM | RESPIRATION RATE: 20 BRPM | SYSTOLIC BLOOD PRESSURE: 146 MMHG | HEIGHT: 62 IN | TEMPERATURE: 97 F

## 2021-03-08 DIAGNOSIS — K92.2 GI BLEED: ICD-10-CM

## 2021-03-08 DIAGNOSIS — D64.9 ANEMIA, UNSPECIFIED TYPE: Primary | ICD-10-CM

## 2021-03-08 PROCEDURE — 37000009 HC ANESTHESIA EA ADD 15 MINS: Performed by: INTERNAL MEDICINE

## 2021-03-08 PROCEDURE — 45380 COLONOSCOPY AND BIOPSY: CPT | Mod: ,,, | Performed by: INTERNAL MEDICINE

## 2021-03-08 PROCEDURE — E9220 PRA ENDO ANESTHESIA: HCPCS | Mod: ,,, | Performed by: REGISTERED NURSE

## 2021-03-08 PROCEDURE — 25000003 PHARM REV CODE 250: Performed by: INTERNAL MEDICINE

## 2021-03-08 PROCEDURE — 45380 COLONOSCOPY AND BIOPSY: CPT | Performed by: INTERNAL MEDICINE

## 2021-03-08 PROCEDURE — 88305 TISSUE EXAM BY PATHOLOGIST: CPT | Mod: 59 | Performed by: PATHOLOGY

## 2021-03-08 PROCEDURE — 88305 TISSUE EXAM BY PATHOLOGIST: CPT | Mod: 26,,, | Performed by: PATHOLOGY

## 2021-03-08 PROCEDURE — 25000003 PHARM REV CODE 250: Performed by: REGISTERED NURSE

## 2021-03-08 PROCEDURE — 27201012 HC FORCEPS, HOT/COLD, DISP: Performed by: INTERNAL MEDICINE

## 2021-03-08 PROCEDURE — 37000008 HC ANESTHESIA 1ST 15 MINUTES: Performed by: INTERNAL MEDICINE

## 2021-03-08 PROCEDURE — 88305 TISSUE EXAM BY PATHOLOGIST: ICD-10-PCS | Mod: 26,,, | Performed by: PATHOLOGY

## 2021-03-08 PROCEDURE — 63600175 PHARM REV CODE 636 W HCPCS: Performed by: REGISTERED NURSE

## 2021-03-08 PROCEDURE — E9220 PRA ENDO ANESTHESIA: ICD-10-PCS | Mod: ,,, | Performed by: REGISTERED NURSE

## 2021-03-08 PROCEDURE — 45380 PR COLONOSCOPY,BIOPSY: ICD-10-PCS | Mod: ,,, | Performed by: INTERNAL MEDICINE

## 2021-03-08 RX ORDER — MIDAZOLAM HYDROCHLORIDE 1 MG/ML
INJECTION, SOLUTION INTRAMUSCULAR; INTRAVENOUS
Status: DISCONTINUED | OUTPATIENT
Start: 2021-03-08 | End: 2021-03-08

## 2021-03-08 RX ORDER — LIDOCAINE HYDROCHLORIDE 20 MG/ML
INJECTION INTRAVENOUS
Status: DISCONTINUED | OUTPATIENT
Start: 2021-03-08 | End: 2021-03-08

## 2021-03-08 RX ORDER — SODIUM CHLORIDE 9 MG/ML
INJECTION, SOLUTION INTRAVENOUS CONTINUOUS PRN
Status: DISCONTINUED | OUTPATIENT
Start: 2021-03-08 | End: 2021-03-08

## 2021-03-08 RX ORDER — PROPOFOL 10 MG/ML
VIAL (ML) INTRAVENOUS
Status: DISCONTINUED | OUTPATIENT
Start: 2021-03-08 | End: 2021-03-08

## 2021-03-08 RX ORDER — SODIUM CHLORIDE 9 MG/ML
INJECTION, SOLUTION INTRAVENOUS CONTINUOUS
Status: DISCONTINUED | OUTPATIENT
Start: 2021-03-08 | End: 2021-03-08 | Stop reason: HOSPADM

## 2021-03-08 RX ORDER — PROPOFOL 10 MG/ML
VIAL (ML) INTRAVENOUS CONTINUOUS PRN
Status: DISCONTINUED | OUTPATIENT
Start: 2021-03-08 | End: 2021-03-08

## 2021-03-08 RX ADMIN — PROPOFOL 100 MG: 10 INJECTION, EMULSION INTRAVENOUS at 01:03

## 2021-03-08 RX ADMIN — PROPOFOL 20 MG: 10 INJECTION, EMULSION INTRAVENOUS at 01:03

## 2021-03-08 RX ADMIN — MIDAZOLAM HYDROCHLORIDE 2 MG: 1 INJECTION, SOLUTION INTRAMUSCULAR; INTRAVENOUS at 01:03

## 2021-03-08 RX ADMIN — LIDOCAINE HYDROCHLORIDE 100 MG: 20 INJECTION, SOLUTION INTRAVENOUS at 01:03

## 2021-03-08 RX ADMIN — SODIUM CHLORIDE: 0.9 INJECTION, SOLUTION INTRAVENOUS at 12:03

## 2021-03-08 RX ADMIN — GLYCOPYRROLATE 0.2 MG: 0.2 INJECTION, SOLUTION INTRAMUSCULAR; INTRAVITREAL at 01:03

## 2021-03-08 RX ADMIN — SODIUM CHLORIDE: 9 INJECTION, SOLUTION INTRAVENOUS at 12:03

## 2021-03-08 RX ADMIN — PROPOFOL 200 MCG/KG/MIN: 10 INJECTION, EMULSION INTRAVENOUS at 01:03

## 2021-03-10 LAB
FINAL PATHOLOGIC DIAGNOSIS: NORMAL
GROSS: NORMAL
Lab: NORMAL

## 2021-03-11 ENCOUNTER — TELEPHONE (OUTPATIENT)
Dept: GASTROENTEROLOGY | Facility: CLINIC | Age: 41
End: 2021-03-11

## 2021-03-15 ENCOUNTER — LAB VISIT (OUTPATIENT)
Dept: LAB | Facility: HOSPITAL | Age: 41
End: 2021-03-15
Payer: COMMERCIAL

## 2021-03-15 ENCOUNTER — TELEPHONE (OUTPATIENT)
Dept: GASTROENTEROLOGY | Facility: CLINIC | Age: 41
End: 2021-03-15

## 2021-03-15 ENCOUNTER — OFFICE VISIT (OUTPATIENT)
Dept: GASTROENTEROLOGY | Facility: CLINIC | Age: 41
End: 2021-03-15
Payer: COMMERCIAL

## 2021-03-15 VITALS
HEIGHT: 62 IN | SYSTOLIC BLOOD PRESSURE: 115 MMHG | BODY MASS INDEX: 20.93 KG/M2 | HEART RATE: 63 BPM | DIASTOLIC BLOOD PRESSURE: 73 MMHG | WEIGHT: 113.75 LBS

## 2021-03-15 DIAGNOSIS — D64.9 ANEMIA, UNSPECIFIED TYPE: ICD-10-CM

## 2021-03-15 DIAGNOSIS — D64.9 ANEMIA, UNSPECIFIED TYPE: Primary | ICD-10-CM

## 2021-03-15 LAB
BASOPHILS # BLD AUTO: 0.06 K/UL (ref 0–0.2)
BASOPHILS NFR BLD: 0.9 % (ref 0–1.9)
DIFFERENTIAL METHOD: ABNORMAL
EOSINOPHIL # BLD AUTO: 0.3 K/UL (ref 0–0.5)
EOSINOPHIL NFR BLD: 4.4 % (ref 0–8)
ERYTHROCYTE [DISTWIDTH] IN BLOOD BY AUTOMATED COUNT: 14.4 % (ref 11.5–14.5)
HCT VFR BLD AUTO: 36.7 % (ref 37–48.5)
HGB BLD-MCNC: 12.1 G/DL (ref 12–16)
IMM GRANULOCYTES # BLD AUTO: 0.02 K/UL (ref 0–0.04)
IMM GRANULOCYTES NFR BLD AUTO: 0.3 % (ref 0–0.5)
LYMPHOCYTES # BLD AUTO: 1.5 K/UL (ref 1–4.8)
LYMPHOCYTES NFR BLD: 22.2 % (ref 18–48)
MCH RBC QN AUTO: 33.2 PG (ref 27–31)
MCHC RBC AUTO-ENTMCNC: 33 G/DL (ref 32–36)
MCV RBC AUTO: 101 FL (ref 82–98)
MONOCYTES # BLD AUTO: 0.5 K/UL (ref 0.3–1)
MONOCYTES NFR BLD: 7.1 % (ref 4–15)
NEUTROPHILS # BLD AUTO: 4.3 K/UL (ref 1.8–7.7)
NEUTROPHILS NFR BLD: 65.1 % (ref 38–73)
NRBC BLD-RTO: 0 /100 WBC
PLATELET # BLD AUTO: 175 K/UL (ref 150–350)
PMV BLD AUTO: 10.5 FL (ref 9.2–12.9)
RBC # BLD AUTO: 3.65 M/UL (ref 4–5.4)
WBC # BLD AUTO: 6.66 K/UL (ref 3.9–12.7)

## 2021-03-15 PROCEDURE — 3008F BODY MASS INDEX DOCD: CPT | Mod: CPTII,S$GLB,, | Performed by: FAMILY MEDICINE

## 2021-03-15 PROCEDURE — 1126F AMNT PAIN NOTED NONE PRSNT: CPT | Mod: S$GLB,,, | Performed by: FAMILY MEDICINE

## 2021-03-15 PROCEDURE — 99999 PR PBB SHADOW E&M-EST. PATIENT-LVL III: ICD-10-PCS | Mod: PBBFAC,,, | Performed by: FAMILY MEDICINE

## 2021-03-15 PROCEDURE — 85025 COMPLETE CBC W/AUTO DIFF WBC: CPT | Performed by: FAMILY MEDICINE

## 2021-03-15 PROCEDURE — 99999 PR PBB SHADOW E&M-EST. PATIENT-LVL III: CPT | Mod: PBBFAC,,, | Performed by: FAMILY MEDICINE

## 2021-03-15 PROCEDURE — 99214 OFFICE O/P EST MOD 30 MIN: CPT | Mod: S$GLB,,, | Performed by: FAMILY MEDICINE

## 2021-03-15 PROCEDURE — 99214 PR OFFICE/OUTPT VISIT, EST, LEVL IV, 30-39 MIN: ICD-10-PCS | Mod: S$GLB,,, | Performed by: FAMILY MEDICINE

## 2021-03-15 PROCEDURE — 3008F PR BODY MASS INDEX (BMI) DOCUMENTED: ICD-10-PCS | Mod: CPTII,S$GLB,, | Performed by: FAMILY MEDICINE

## 2021-03-15 PROCEDURE — 36415 COLL VENOUS BLD VENIPUNCTURE: CPT | Performed by: FAMILY MEDICINE

## 2021-03-15 PROCEDURE — 1126F PR PAIN SEVERITY QUANTIFIED, NO PAIN PRESENT: ICD-10-PCS | Mod: S$GLB,,, | Performed by: FAMILY MEDICINE

## 2021-03-23 ENCOUNTER — OFFICE VISIT (OUTPATIENT)
Dept: INTERNAL MEDICINE | Facility: CLINIC | Age: 41
End: 2021-03-23
Payer: COMMERCIAL

## 2021-03-23 VITALS
DIASTOLIC BLOOD PRESSURE: 62 MMHG | SYSTOLIC BLOOD PRESSURE: 134 MMHG | WEIGHT: 114.63 LBS | HEIGHT: 62 IN | BODY MASS INDEX: 21.1 KG/M2 | HEART RATE: 65 BPM

## 2021-03-23 DIAGNOSIS — Z12.31 SCREENING MAMMOGRAM, ENCOUNTER FOR: Primary | ICD-10-CM

## 2021-03-23 DIAGNOSIS — Z00.00 ROUTINE GENERAL MEDICAL EXAMINATION AT A HEALTH CARE FACILITY: ICD-10-CM

## 2021-03-23 PROCEDURE — 99395 PR PREVENTIVE VISIT,EST,18-39: ICD-10-PCS | Mod: S$GLB,,, | Performed by: INTERNAL MEDICINE

## 2021-03-23 PROCEDURE — 99999 PR PBB SHADOW E&M-EST. PATIENT-LVL III: CPT | Mod: PBBFAC,,, | Performed by: INTERNAL MEDICINE

## 2021-03-23 PROCEDURE — 99395 PREV VISIT EST AGE 18-39: CPT | Mod: S$GLB,,, | Performed by: INTERNAL MEDICINE

## 2021-03-23 PROCEDURE — 3008F BODY MASS INDEX DOCD: CPT | Mod: CPTII,S$GLB,, | Performed by: INTERNAL MEDICINE

## 2021-03-23 PROCEDURE — 1126F PR PAIN SEVERITY QUANTIFIED, NO PAIN PRESENT: ICD-10-PCS | Mod: S$GLB,,, | Performed by: INTERNAL MEDICINE

## 2021-03-23 PROCEDURE — 99999 PR PBB SHADOW E&M-EST. PATIENT-LVL III: ICD-10-PCS | Mod: PBBFAC,,, | Performed by: INTERNAL MEDICINE

## 2021-03-23 PROCEDURE — 1126F AMNT PAIN NOTED NONE PRSNT: CPT | Mod: S$GLB,,, | Performed by: INTERNAL MEDICINE

## 2021-03-23 PROCEDURE — 3008F PR BODY MASS INDEX (BMI) DOCUMENTED: ICD-10-PCS | Mod: CPTII,S$GLB,, | Performed by: INTERNAL MEDICINE

## 2021-04-13 ENCOUNTER — PATIENT MESSAGE (OUTPATIENT)
Dept: INTERNAL MEDICINE | Facility: CLINIC | Age: 41
End: 2021-04-13

## 2021-04-13 ENCOUNTER — LAB VISIT (OUTPATIENT)
Dept: LAB | Facility: HOSPITAL | Age: 41
End: 2021-04-13
Attending: INTERNAL MEDICINE
Payer: COMMERCIAL

## 2021-04-13 ENCOUNTER — TELEPHONE (OUTPATIENT)
Dept: GASTROENTEROLOGY | Facility: CLINIC | Age: 41
End: 2021-04-13

## 2021-04-13 DIAGNOSIS — D50.9 IRON DEFICIENCY ANEMIA, UNSPECIFIED IRON DEFICIENCY ANEMIA TYPE: Primary | ICD-10-CM

## 2021-04-13 DIAGNOSIS — Z00.00 ROUTINE GENERAL MEDICAL EXAMINATION AT A HEALTH CARE FACILITY: ICD-10-CM

## 2021-04-13 LAB
25(OH)D3+25(OH)D2 SERPL-MCNC: 29 NG/ML (ref 30–96)
ALBUMIN SERPL BCP-MCNC: 4.2 G/DL (ref 3.5–5.2)
ALP SERPL-CCNC: 59 U/L (ref 55–135)
ALT SERPL W/O P-5'-P-CCNC: 11 U/L (ref 10–44)
ANION GAP SERPL CALC-SCNC: 11 MMOL/L (ref 8–16)
AST SERPL-CCNC: 18 U/L (ref 10–40)
BASOPHILS # BLD AUTO: 0.03 K/UL (ref 0–0.2)
BASOPHILS NFR BLD: 0.9 % (ref 0–1.9)
BILIRUB SERPL-MCNC: 0.9 MG/DL (ref 0.1–1)
BUN SERPL-MCNC: 8 MG/DL (ref 6–20)
CALCIUM SERPL-MCNC: 8.8 MG/DL (ref 8.7–10.5)
CHLORIDE SERPL-SCNC: 104 MMOL/L (ref 95–110)
CHOLEST SERPL-MCNC: 242 MG/DL (ref 120–199)
CHOLEST/HDLC SERPL: 3.2 {RATIO} (ref 2–5)
CO2 SERPL-SCNC: 23 MMOL/L (ref 23–29)
CREAT SERPL-MCNC: 0.7 MG/DL (ref 0.5–1.4)
DIFFERENTIAL METHOD: ABNORMAL
EOSINOPHIL # BLD AUTO: 0.3 K/UL (ref 0–0.5)
EOSINOPHIL NFR BLD: 10.6 % (ref 0–8)
ERYTHROCYTE [DISTWIDTH] IN BLOOD BY AUTOMATED COUNT: 12.4 % (ref 11.5–14.5)
EST. GFR  (AFRICAN AMERICAN): >60 ML/MIN/1.73 M^2
EST. GFR  (NON AFRICAN AMERICAN): >60 ML/MIN/1.73 M^2
ESTIMATED AVG GLUCOSE: 82 MG/DL (ref 68–131)
FERRITIN SERPL-MCNC: 34 NG/ML (ref 20–300)
GLUCOSE SERPL-MCNC: 89 MG/DL (ref 70–110)
HBA1C MFR BLD: 4.5 % (ref 4–5.6)
HCT VFR BLD AUTO: 34.8 % (ref 37–48.5)
HDLC SERPL-MCNC: 76 MG/DL (ref 40–75)
HDLC SERPL: 31.4 % (ref 20–50)
HGB BLD-MCNC: 11.7 G/DL (ref 12–16)
IMM GRANULOCYTES # BLD AUTO: 0 K/UL (ref 0–0.04)
IMM GRANULOCYTES NFR BLD AUTO: 0 % (ref 0–0.5)
LDLC SERPL CALC-MCNC: 154 MG/DL (ref 63–159)
LYMPHOCYTES # BLD AUTO: 0.8 K/UL (ref 1–4.8)
LYMPHOCYTES NFR BLD: 25.5 % (ref 18–48)
MCH RBC QN AUTO: 33.5 PG (ref 27–31)
MCHC RBC AUTO-ENTMCNC: 33.6 G/DL (ref 32–36)
MCV RBC AUTO: 100 FL (ref 82–98)
MONOCYTES # BLD AUTO: 0.3 K/UL (ref 0.3–1)
MONOCYTES NFR BLD: 9.7 % (ref 4–15)
NEUTROPHILS # BLD AUTO: 1.7 K/UL (ref 1.8–7.7)
NEUTROPHILS NFR BLD: 53.3 % (ref 38–73)
NONHDLC SERPL-MCNC: 166 MG/DL
NRBC BLD-RTO: 0 /100 WBC
PLATELET # BLD AUTO: 149 K/UL (ref 150–450)
PMV BLD AUTO: 11.7 FL (ref 9.2–12.9)
POTASSIUM SERPL-SCNC: 3.8 MMOL/L (ref 3.5–5.1)
PROT SERPL-MCNC: 7.5 G/DL (ref 6–8.4)
RBC # BLD AUTO: 3.49 M/UL (ref 4–5.4)
SODIUM SERPL-SCNC: 138 MMOL/L (ref 136–145)
TRIGL SERPL-MCNC: 60 MG/DL (ref 30–150)
TSH SERPL DL<=0.005 MIU/L-ACNC: 0.88 UIU/ML (ref 0.4–4)
VIT B12 SERPL-MCNC: 599 PG/ML (ref 210–950)
WBC # BLD AUTO: 3.21 K/UL (ref 3.9–12.7)

## 2021-04-13 PROCEDURE — 80061 LIPID PANEL: CPT | Performed by: INTERNAL MEDICINE

## 2021-04-13 PROCEDURE — 82607 VITAMIN B-12: CPT | Performed by: INTERNAL MEDICINE

## 2021-04-13 PROCEDURE — 82306 VITAMIN D 25 HYDROXY: CPT | Performed by: INTERNAL MEDICINE

## 2021-04-13 PROCEDURE — 80053 COMPREHEN METABOLIC PANEL: CPT | Performed by: INTERNAL MEDICINE

## 2021-04-13 PROCEDURE — 83036 HEMOGLOBIN GLYCOSYLATED A1C: CPT | Performed by: INTERNAL MEDICINE

## 2021-04-13 PROCEDURE — 85025 COMPLETE CBC W/AUTO DIFF WBC: CPT | Performed by: INTERNAL MEDICINE

## 2021-04-13 PROCEDURE — 84443 ASSAY THYROID STIM HORMONE: CPT | Performed by: INTERNAL MEDICINE

## 2021-04-13 PROCEDURE — 82728 ASSAY OF FERRITIN: CPT | Performed by: INTERNAL MEDICINE

## 2021-04-13 PROCEDURE — 36415 COLL VENOUS BLD VENIPUNCTURE: CPT | Performed by: INTERNAL MEDICINE

## 2021-04-20 ENCOUNTER — CLINICAL SUPPORT (OUTPATIENT)
Dept: CARDIOLOGY | Facility: HOSPITAL | Age: 41
End: 2021-04-20
Payer: COMMERCIAL

## 2021-04-20 DIAGNOSIS — Z95.810 PRESENCE OF AUTOMATIC (IMPLANTABLE) CARDIAC DEFIBRILLATOR: ICD-10-CM

## 2021-04-20 PROCEDURE — 93295 DEV INTERROG REMOTE 1/2/MLT: CPT | Mod: ,,, | Performed by: INTERNAL MEDICINE

## 2021-04-20 PROCEDURE — 93295 CARDIAC DEVICE CHECK - REMOTE: ICD-10-PCS | Mod: ,,, | Performed by: INTERNAL MEDICINE

## 2021-04-20 PROCEDURE — 93296 REM INTERROG EVL PM/IDS: CPT | Performed by: INTERNAL MEDICINE

## 2021-04-30 ENCOUNTER — OFFICE VISIT (OUTPATIENT)
Dept: ELECTROPHYSIOLOGY | Facility: CLINIC | Age: 41
End: 2021-04-30
Payer: COMMERCIAL

## 2021-04-30 ENCOUNTER — HOSPITAL ENCOUNTER (OUTPATIENT)
Dept: CARDIOLOGY | Facility: CLINIC | Age: 41
Discharge: HOME | End: 2021-04-30
Payer: COMMERCIAL

## 2021-04-30 ENCOUNTER — CLINICAL SUPPORT (OUTPATIENT)
Dept: CARDIOLOGY | Facility: HOSPITAL | Age: 41
End: 2021-04-30
Attending: INTERNAL MEDICINE
Payer: COMMERCIAL

## 2021-04-30 VITALS
HEIGHT: 62 IN | WEIGHT: 115.31 LBS | SYSTOLIC BLOOD PRESSURE: 123 MMHG | BODY MASS INDEX: 21.22 KG/M2 | DIASTOLIC BLOOD PRESSURE: 76 MMHG | HEART RATE: 56 BPM

## 2021-04-30 DIAGNOSIS — I49.8 OTHER SPECIFIED CARDIAC ARRHYTHMIAS: ICD-10-CM

## 2021-04-30 DIAGNOSIS — Z95.810 AUTOMATIC IMPLANTABLE CARDIOVERTER-DEFIBRILLATOR IN SITU: Primary | ICD-10-CM

## 2021-04-30 DIAGNOSIS — R55 NEAR SYNCOPE: ICD-10-CM

## 2021-04-30 DIAGNOSIS — I45.81 LONG Q-T SYNDROME: ICD-10-CM

## 2021-04-30 PROCEDURE — 99999 PR PBB SHADOW E&M-EST. PATIENT-LVL III: CPT | Mod: PBBFAC,,, | Performed by: NURSE PRACTITIONER

## 2021-04-30 PROCEDURE — 93283 PRGRMG EVAL IMPLANTABLE DFB: CPT | Mod: 26,,, | Performed by: INTERNAL MEDICINE

## 2021-04-30 PROCEDURE — 3008F PR BODY MASS INDEX (BMI) DOCUMENTED: ICD-10-PCS | Mod: CPTII,S$GLB,, | Performed by: NURSE PRACTITIONER

## 2021-04-30 PROCEDURE — 1126F PR PAIN SEVERITY QUANTIFIED, NO PAIN PRESENT: ICD-10-PCS | Mod: S$GLB,,, | Performed by: NURSE PRACTITIONER

## 2021-04-30 PROCEDURE — 93005 RHYTHM STRIP: ICD-10-PCS | Mod: S$GLB,,, | Performed by: INTERNAL MEDICINE

## 2021-04-30 PROCEDURE — 93283 CARDIAC DEVICE CHECK - IN CLINIC & HOSPITAL: ICD-10-PCS | Mod: 26,,, | Performed by: INTERNAL MEDICINE

## 2021-04-30 PROCEDURE — 3008F BODY MASS INDEX DOCD: CPT | Mod: CPTII,S$GLB,, | Performed by: NURSE PRACTITIONER

## 2021-04-30 PROCEDURE — 93010 ELECTROCARDIOGRAM REPORT: CPT | Mod: S$GLB,,, | Performed by: INTERNAL MEDICINE

## 2021-04-30 PROCEDURE — 99214 PR OFFICE/OUTPT VISIT, EST, LEVL IV, 30-39 MIN: ICD-10-PCS | Mod: S$GLB,,, | Performed by: NURSE PRACTITIONER

## 2021-04-30 PROCEDURE — 99999 PR PBB SHADOW E&M-EST. PATIENT-LVL III: ICD-10-PCS | Mod: PBBFAC,,, | Performed by: NURSE PRACTITIONER

## 2021-04-30 PROCEDURE — 1126F AMNT PAIN NOTED NONE PRSNT: CPT | Mod: S$GLB,,, | Performed by: NURSE PRACTITIONER

## 2021-04-30 PROCEDURE — 99214 OFFICE O/P EST MOD 30 MIN: CPT | Mod: S$GLB,,, | Performed by: NURSE PRACTITIONER

## 2021-04-30 PROCEDURE — 93283 PRGRMG EVAL IMPLANTABLE DFB: CPT

## 2021-04-30 PROCEDURE — 93005 ELECTROCARDIOGRAM TRACING: CPT | Mod: S$GLB,,, | Performed by: INTERNAL MEDICINE

## 2021-04-30 PROCEDURE — 93010 RHYTHM STRIP: ICD-10-PCS | Mod: S$GLB,,, | Performed by: INTERNAL MEDICINE

## 2021-04-30 RX ORDER — CHOLECALCIFEROL (VITAMIN D3) 25 MCG
1000 TABLET ORAL DAILY
COMMUNITY

## 2021-04-30 RX ORDER — AMOXICILLIN 500 MG
CAPSULE ORAL DAILY
COMMUNITY

## 2021-04-30 RX ORDER — COVID-19 TEST SPECIMEN COLLECT
MISCELLANEOUS MISCELLANEOUS
COMMUNITY
Start: 2021-03-30 | End: 2024-03-26

## 2021-05-04 ENCOUNTER — CLINICAL SUPPORT (OUTPATIENT)
Dept: GASTROENTEROLOGY | Facility: CLINIC | Age: 41
End: 2021-05-04
Payer: COMMERCIAL

## 2021-05-04 ENCOUNTER — TELEPHONE (OUTPATIENT)
Dept: GASTROENTEROLOGY | Facility: CLINIC | Age: 41
End: 2021-05-04

## 2021-05-04 DIAGNOSIS — D64.9 ANEMIA, UNSPECIFIED TYPE: ICD-10-CM

## 2021-05-04 PROCEDURE — 91110 PR GI TRACT CAPSULE ENDOSCOPY: ICD-10-PCS | Mod: S$GLB,,, | Performed by: INTERNAL MEDICINE

## 2021-05-04 PROCEDURE — 91110 GI TRC IMG INTRAL ESOPH-ILE: CPT | Mod: S$GLB,,, | Performed by: INTERNAL MEDICINE

## 2021-05-12 ENCOUNTER — PATIENT MESSAGE (OUTPATIENT)
Dept: GASTROENTEROLOGY | Facility: CLINIC | Age: 41
End: 2021-05-12

## 2021-05-18 ENCOUNTER — HOSPITAL ENCOUNTER (OUTPATIENT)
Dept: RADIOLOGY | Facility: OTHER | Age: 41
Discharge: HOME | End: 2021-05-18
Attending: INTERNAL MEDICINE
Payer: COMMERCIAL

## 2021-05-18 DIAGNOSIS — Z12.31 SCREENING MAMMOGRAM, ENCOUNTER FOR: ICD-10-CM

## 2021-05-18 PROCEDURE — 77063 BREAST TOMOSYNTHESIS BI: CPT | Mod: 26,,, | Performed by: RADIOLOGY

## 2021-05-18 PROCEDURE — 77067 MAMMO DIGITAL SCREENING BILAT WITH TOMO: ICD-10-PCS | Mod: 26,,, | Performed by: RADIOLOGY

## 2021-05-18 PROCEDURE — 77063 MAMMO DIGITAL SCREENING BILAT WITH TOMO: ICD-10-PCS | Mod: 26,,, | Performed by: RADIOLOGY

## 2021-05-18 PROCEDURE — 77067 SCR MAMMO BI INCL CAD: CPT | Mod: 26,,, | Performed by: RADIOLOGY

## 2021-05-18 PROCEDURE — 77067 SCR MAMMO BI INCL CAD: CPT | Mod: TC

## 2021-05-25 ENCOUNTER — PATIENT MESSAGE (OUTPATIENT)
Dept: ELECTROPHYSIOLOGY | Facility: CLINIC | Age: 41
End: 2021-05-25

## 2021-05-31 ENCOUNTER — PATIENT MESSAGE (OUTPATIENT)
Dept: GASTROENTEROLOGY | Facility: CLINIC | Age: 41
End: 2021-05-31

## 2021-06-16 ENCOUNTER — PATIENT MESSAGE (OUTPATIENT)
Dept: GASTROENTEROLOGY | Facility: CLINIC | Age: 41
End: 2021-06-16

## 2021-06-23 ENCOUNTER — TELEPHONE (OUTPATIENT)
Dept: GASTROENTEROLOGY | Facility: HOSPITAL | Age: 41
End: 2021-06-23

## 2021-06-23 ENCOUNTER — TELEPHONE (OUTPATIENT)
Dept: ENDOSCOPY | Facility: HOSPITAL | Age: 41
End: 2021-06-23

## 2021-06-29 ENCOUNTER — PATIENT MESSAGE (OUTPATIENT)
Dept: ENDOSCOPY | Facility: HOSPITAL | Age: 41
End: 2021-06-29

## 2021-07-16 ENCOUNTER — TELEPHONE (OUTPATIENT)
Dept: CARDIOLOGY | Facility: HOSPITAL | Age: 41
End: 2021-07-16

## 2021-07-16 ENCOUNTER — PATIENT MESSAGE (OUTPATIENT)
Dept: CARDIOLOGY | Facility: HOSPITAL | Age: 41
End: 2021-07-16

## 2021-08-03 ENCOUNTER — PATIENT OUTREACH (OUTPATIENT)
Dept: ADMINISTRATIVE | Facility: OTHER | Age: 41
End: 2021-08-03

## 2021-08-05 ENCOUNTER — OFFICE VISIT (OUTPATIENT)
Dept: GASTROENTEROLOGY | Facility: CLINIC | Age: 41
End: 2021-08-05
Payer: COMMERCIAL

## 2021-08-05 DIAGNOSIS — K92.2 GASTROINTESTINAL HEMORRHAGE, UNSPECIFIED GASTROINTESTINAL HEMORRHAGE TYPE: Primary | ICD-10-CM

## 2021-08-05 PROCEDURE — 1160F RVW MEDS BY RX/DR IN RCRD: CPT | Mod: CPTII,95,, | Performed by: INTERNAL MEDICINE

## 2021-08-05 PROCEDURE — 3044F HG A1C LEVEL LT 7.0%: CPT | Mod: CPTII,95,, | Performed by: INTERNAL MEDICINE

## 2021-08-05 PROCEDURE — 1160F PR REVIEW ALL MEDS BY PRESCRIBER/CLIN PHARMACIST DOCUMENTED: ICD-10-PCS | Mod: CPTII,95,, | Performed by: INTERNAL MEDICINE

## 2021-08-05 PROCEDURE — 1159F MED LIST DOCD IN RCRD: CPT | Mod: CPTII,95,, | Performed by: INTERNAL MEDICINE

## 2021-08-05 PROCEDURE — 99213 OFFICE O/P EST LOW 20 MIN: CPT | Mod: 95,,, | Performed by: INTERNAL MEDICINE

## 2021-08-05 PROCEDURE — 3044F PR MOST RECENT HEMOGLOBIN A1C LEVEL <7.0%: ICD-10-PCS | Mod: CPTII,95,, | Performed by: INTERNAL MEDICINE

## 2021-08-05 PROCEDURE — 99213 PR OFFICE/OUTPT VISIT, EST, LEVL III, 20-29 MIN: ICD-10-PCS | Mod: 95,,, | Performed by: INTERNAL MEDICINE

## 2021-08-05 PROCEDURE — 1159F PR MEDICATION LIST DOCUMENTED IN MEDICAL RECORD: ICD-10-PCS | Mod: CPTII,95,, | Performed by: INTERNAL MEDICINE

## 2021-11-15 ENCOUNTER — LAB VISIT (OUTPATIENT)
Dept: INTERNAL MEDICINE | Facility: CLINIC | Age: 41
End: 2021-11-15
Payer: COMMERCIAL

## 2021-11-15 DIAGNOSIS — Z11.52 ENCOUNTER FOR SCREENING FOR COVID-19: Primary | ICD-10-CM

## 2021-11-15 LAB
CTP QC/QA: YES
SARS-COV-2 RDRP RESP QL NAA+PROBE: NEGATIVE

## 2021-11-15 PROCEDURE — U0002: ICD-10-PCS | Mod: QW,S$GLB,, | Performed by: INTERNAL MEDICINE

## 2021-11-15 PROCEDURE — U0002 COVID-19 LAB TEST NON-CDC: HCPCS | Mod: QW,S$GLB,, | Performed by: INTERNAL MEDICINE

## 2021-12-14 ENCOUNTER — PATIENT MESSAGE (OUTPATIENT)
Dept: GASTROENTEROLOGY | Facility: CLINIC | Age: 41
End: 2021-12-14
Payer: COMMERCIAL

## 2021-12-14 ENCOUNTER — OFFICE VISIT (OUTPATIENT)
Dept: GASTROENTEROLOGY | Facility: CLINIC | Age: 41
End: 2021-12-14
Payer: COMMERCIAL

## 2021-12-14 DIAGNOSIS — Z87.19 HISTORY OF GI BLEED: Primary | ICD-10-CM

## 2021-12-14 DIAGNOSIS — K63.9 DISORDER OF JEJUNUM: ICD-10-CM

## 2021-12-14 DIAGNOSIS — D50.9 IRON DEFICIENCY ANEMIA, UNSPECIFIED IRON DEFICIENCY ANEMIA TYPE: ICD-10-CM

## 2021-12-14 PROBLEM — K92.2 GI BLEED: Status: RESOLVED | Noted: 2021-03-08 | Resolved: 2021-12-14

## 2021-12-14 PROBLEM — D64.9 ANEMIA: Status: RESOLVED | Noted: 2021-02-28 | Resolved: 2021-12-14

## 2021-12-14 PROCEDURE — 99213 OFFICE O/P EST LOW 20 MIN: CPT | Mod: 95,,, | Performed by: INTERNAL MEDICINE

## 2021-12-14 PROCEDURE — 99213 PR OFFICE/OUTPT VISIT, EST, LEVL III, 20-29 MIN: ICD-10-PCS | Mod: 95,,, | Performed by: INTERNAL MEDICINE

## 2021-12-16 ENCOUNTER — TELEPHONE (OUTPATIENT)
Dept: GASTROENTEROLOGY | Facility: CLINIC | Age: 41
End: 2021-12-16
Payer: COMMERCIAL

## 2022-01-13 ENCOUNTER — TELEPHONE (OUTPATIENT)
Dept: CARDIOLOGY | Facility: HOSPITAL | Age: 42
End: 2022-01-13
Payer: COMMERCIAL

## 2022-01-13 NOTE — TELEPHONE ENCOUNTER
Spoke with patient and asked her to send over her remote transmission.  She stated she moved and forgot to reconnect her monitor.  She said she will send this transmission today or tomorrow.

## 2022-01-14 ENCOUNTER — TELEPHONE (OUTPATIENT)
Dept: CARDIOLOGY | Facility: HOSPITAL | Age: 42
End: 2022-01-14
Payer: COMMERCIAL

## 2022-01-14 NOTE — TELEPHONE ENCOUNTER
Called patient to follow up that [facility] transmission not received overnight. Patient states she was not able to set up her monitor and needs about a week.

## 2022-02-01 ENCOUNTER — IMMUNIZATION (OUTPATIENT)
Dept: INTERNAL MEDICINE | Facility: CLINIC | Age: 42
End: 2022-02-01
Payer: COMMERCIAL

## 2022-02-01 DIAGNOSIS — Z23 NEED FOR VACCINATION: Primary | ICD-10-CM

## 2022-02-01 PROCEDURE — 91300 COVID-19, MRNA, LNP-S, PF, 30 MCG/0.3 ML DOSE VACCINE: CPT | Mod: PBBFAC | Performed by: INTERNAL MEDICINE

## 2022-02-15 ENCOUNTER — OFFICE VISIT (OUTPATIENT)
Dept: DERMATOLOGY | Facility: CLINIC | Age: 42
End: 2022-02-15
Payer: COMMERCIAL

## 2022-02-15 DIAGNOSIS — Z12.83 SCREENING EXAM FOR SKIN CANCER: ICD-10-CM

## 2022-02-15 DIAGNOSIS — D22.9 MULTIPLE BENIGN NEVI: Primary | ICD-10-CM

## 2022-02-15 DIAGNOSIS — L82.1 SK (SEBORRHEIC KERATOSIS): ICD-10-CM

## 2022-02-15 PROCEDURE — 1159F PR MEDICATION LIST DOCUMENTED IN MEDICAL RECORD: ICD-10-PCS | Mod: CPTII,S$GLB,, | Performed by: DERMATOLOGY

## 2022-02-15 PROCEDURE — 1160F RVW MEDS BY RX/DR IN RCRD: CPT | Mod: CPTII,S$GLB,, | Performed by: DERMATOLOGY

## 2022-02-15 PROCEDURE — 1160F PR REVIEW ALL MEDS BY PRESCRIBER/CLIN PHARMACIST DOCUMENTED: ICD-10-PCS | Mod: CPTII,S$GLB,, | Performed by: DERMATOLOGY

## 2022-02-15 PROCEDURE — 99999 PR PBB SHADOW E&M-EST. PATIENT-LVL III: CPT | Mod: PBBFAC,,, | Performed by: DERMATOLOGY

## 2022-02-15 PROCEDURE — 99213 OFFICE O/P EST LOW 20 MIN: CPT | Mod: S$GLB,,, | Performed by: DERMATOLOGY

## 2022-02-15 PROCEDURE — 1159F MED LIST DOCD IN RCRD: CPT | Mod: CPTII,S$GLB,, | Performed by: DERMATOLOGY

## 2022-02-15 PROCEDURE — 99213 PR OFFICE/OUTPT VISIT, EST, LEVL III, 20-29 MIN: ICD-10-PCS | Mod: S$GLB,,, | Performed by: DERMATOLOGY

## 2022-02-15 PROCEDURE — 99999 PR PBB SHADOW E&M-EST. PATIENT-LVL III: ICD-10-PCS | Mod: PBBFAC,,, | Performed by: DERMATOLOGY

## 2022-02-15 NOTE — PROGRESS NOTES
Subjective:       Patient ID:  Arianna Duval is a 41 y.o. female who presents for   Chief Complaint   Patient presents with    Skin Check     TBSE     History of Present Illness: The patient presents for follow up of skin check.  Luiss TBSE    The patient was last seen on: 11/23/2020 for bx of mid upper back for melanocytic nevus.  No h/o nmsc     Patient with new complaint of lesion(s)  Location: right shoulder  Duration: just noticed it  Symptoms: none  Relieving factors/Previous treatments: none    No - family history of MM  Yes - childhood blistering sunburns  Distant - tanning bed use          Review of Systems   Skin: Positive for daily sunscreen use and activity-related sunscreen use. Negative for recent sunburn.   Hematologic/Lymphatic: Does not bruise/bleed easily (has pacemaker and defib. ).        Objective:    Physical Exam   Constitutional: She appears well-developed and well-nourished. No distress.   Neurological: She is alert and oriented to person, place, and time. She is not disoriented.   Psychiatric: She has a normal mood and affect.   Skin:   Areas Examined (abnormalities noted in diagram):   Scalp / Hair Palpated and Inspected  Head / Face Inspection Performed  Neck Inspection Performed  Chest / Axilla Inspection Performed  Abdomen Inspection Performed  Genitals / Buttocks / Groin Inspection Performed  Back Inspection Performed  RUE Inspected  LUE Inspection Performed  RLE Inspected  LLE Inspection Performed  Nails and Digits Inspection Performed                       Diagram Legend     Erythematous scaling macule/papule c/w actinic keratosis       Vascular papule c/w angioma      Pigmented verrucoid papule/plaque c/w seborrheic keratosis      Yellow umbilicated papule c/w sebaceous hyperplasia      Irregularly shaped tan macule c/w lentigo     1-2 mm smooth white papules consistent with Milia      Movable subcutaneous cyst with punctum c/w epidermal inclusion cyst      Subcutaneous  movable cyst c/w pilar cyst      Firm pink to brown papule c/w dermatofibroma      Pedunculated fleshy papule(s) c/w skin tag(s)      Evenly pigmented macule c/w junctional nevus     Mildly variegated pigmented, slightly irregular-bordered macule c/w mildly atypical nevus      Flesh colored to evenly pigmented papule c/w intradermal nevus       Pink pearly papule/plaque c/w basal cell carcinoma      Erythematous hyperkeratotic cursted plaque c/w SCC      Surgical scar with no sign of skin cancer recurrence      Open and closed comedones      Inflammatory papules and pustules      Verrucoid papule consistent consistent with wart     Erythematous eczematous patches and plaques     Dystrophic onycholytic nail with subungual debris c/w onychomycosis     Umbilicated papule    Erythematous-base heme-crusted tan verrucoid plaque consistent with inflamed seborrheic keratosis     Erythematous Silvery Scaling Plaque c/w Psoriasis     See annotation      Assessment / Plan:        Multiple benign nevi   - minor problem and chronic.   Reassurance given to patient. No treatment necessary.       SK (seborrheic keratosis)  These are benign inherited growths without a malignant potential. Reassurance given to patient. No treatment is necessary.       Screening exam for skin cancer    Total body skin examination performed today including at least 12 points as noted in physical examination. No lesions suspicious for malignancy noted.    Recommend daily sun protection/avoidance, use of at least SPF 30, broad spectrum sunscreen (OTC drug), skin self examinations, and routine physician surveillance to optimize early detection               No follow-ups on file.

## 2022-02-16 ENCOUNTER — PATIENT MESSAGE (OUTPATIENT)
Dept: CARDIOLOGY | Facility: HOSPITAL | Age: 42
End: 2022-02-16
Payer: COMMERCIAL

## 2022-02-23 ENCOUNTER — PATIENT MESSAGE (OUTPATIENT)
Dept: CARDIOLOGY | Facility: HOSPITAL | Age: 42
End: 2022-02-23
Payer: COMMERCIAL

## 2022-03-10 ENCOUNTER — TELEPHONE (OUTPATIENT)
Dept: OPTOMETRY | Facility: CLINIC | Age: 42
End: 2022-03-10
Payer: COMMERCIAL

## 2022-03-10 NOTE — TELEPHONE ENCOUNTER
----- Message from Edwar Calderon OD sent at 3/10/2022 10:16 AM CST -----  No. Her CLRx is 2 years old. She will need a routine exam w/CL fitting  ----- Message -----  From: Sandy Church  Sent: 3/10/2022  10:14 AM CST  To: Edwar Calderon OD      ----- Message -----  From: Angelica Regan  Sent: 3/10/2022  10:09 AM CST  To: [facility] Staff    Good Morning, this patient wants to know if you can okay for her to get a trial pair of contacts or extend her RX so that she can order some.     Angelica

## 2022-03-13 ENCOUNTER — PATIENT MESSAGE (OUTPATIENT)
Dept: INTERNAL MEDICINE | Facility: CLINIC | Age: 42
End: 2022-03-13
Payer: COMMERCIAL

## 2022-03-13 DIAGNOSIS — D64.9 ANEMIA, UNSPECIFIED TYPE: Primary | ICD-10-CM

## 2022-03-13 DIAGNOSIS — E55.9 VITAMIN D DEFICIENCY DISEASE: ICD-10-CM

## 2022-03-13 DIAGNOSIS — R53.83 FATIGUE, UNSPECIFIED TYPE: ICD-10-CM

## 2022-03-14 ENCOUNTER — PATIENT MESSAGE (OUTPATIENT)
Dept: INTERNAL MEDICINE | Facility: CLINIC | Age: 42
End: 2022-03-14
Payer: COMMERCIAL

## 2022-03-14 ENCOUNTER — LAB VISIT (OUTPATIENT)
Dept: LAB | Facility: HOSPITAL | Age: 42
End: 2022-03-14
Attending: INTERNAL MEDICINE
Payer: COMMERCIAL

## 2022-03-14 DIAGNOSIS — R53.83 FATIGUE, UNSPECIFIED TYPE: ICD-10-CM

## 2022-03-14 DIAGNOSIS — E55.9 VITAMIN D DEFICIENCY DISEASE: ICD-10-CM

## 2022-03-14 DIAGNOSIS — R73.9 HYPERGLYCEMIA: Primary | ICD-10-CM

## 2022-03-14 DIAGNOSIS — D64.9 ANEMIA, UNSPECIFIED TYPE: ICD-10-CM

## 2022-03-14 LAB
25(OH)D3+25(OH)D2 SERPL-MCNC: 40 NG/ML (ref 30–96)
ALBUMIN SERPL BCP-MCNC: 4.3 G/DL (ref 3.5–5.2)
ALP SERPL-CCNC: 78 U/L (ref 55–135)
ALT SERPL W/O P-5'-P-CCNC: 13 U/L (ref 10–44)
ANION GAP SERPL CALC-SCNC: 11 MMOL/L (ref 8–16)
AST SERPL-CCNC: 19 U/L (ref 10–40)
BASOPHILS # BLD AUTO: 0.06 K/UL (ref 0–0.2)
BASOPHILS NFR BLD: 1 % (ref 0–1.9)
BILIRUB SERPL-MCNC: 0.6 MG/DL (ref 0.1–1)
BUN SERPL-MCNC: 11 MG/DL (ref 6–20)
CALCIUM SERPL-MCNC: 10 MG/DL (ref 8.7–10.5)
CHLORIDE SERPL-SCNC: 98 MMOL/L (ref 95–110)
CO2 SERPL-SCNC: 26 MMOL/L (ref 23–29)
CREAT SERPL-MCNC: 0.8 MG/DL (ref 0.5–1.4)
DIFFERENTIAL METHOD: ABNORMAL
EOSINOPHIL # BLD AUTO: 0.2 K/UL (ref 0–0.5)
EOSINOPHIL NFR BLD: 2.6 % (ref 0–8)
ERYTHROCYTE [DISTWIDTH] IN BLOOD BY AUTOMATED COUNT: 11.6 % (ref 11.5–14.5)
EST. GFR  (AFRICAN AMERICAN): >60 ML/MIN/1.73 M^2
EST. GFR  (NON AFRICAN AMERICAN): >60 ML/MIN/1.73 M^2
FERRITIN SERPL-MCNC: 131 NG/ML (ref 20–300)
GLUCOSE SERPL-MCNC: 232 MG/DL (ref 70–110)
HCT VFR BLD AUTO: 41.5 % (ref 37–48.5)
HGB BLD-MCNC: 14.1 G/DL (ref 12–16)
IMM GRANULOCYTES # BLD AUTO: 0.01 K/UL (ref 0–0.04)
IMM GRANULOCYTES NFR BLD AUTO: 0.2 % (ref 0–0.5)
IRON SERPL-MCNC: 56 UG/DL (ref 30–160)
LYMPHOCYTES # BLD AUTO: 0.7 K/UL (ref 1–4.8)
LYMPHOCYTES NFR BLD: 11.4 % (ref 18–48)
MCH RBC QN AUTO: 35.3 PG (ref 27–31)
MCHC RBC AUTO-ENTMCNC: 34 G/DL (ref 32–36)
MCV RBC AUTO: 104 FL (ref 82–98)
MONOCYTES # BLD AUTO: 0.6 K/UL (ref 0.3–1)
MONOCYTES NFR BLD: 9.3 % (ref 4–15)
NEUTROPHILS # BLD AUTO: 4.6 K/UL (ref 1.8–7.7)
NEUTROPHILS NFR BLD: 75.5 % (ref 38–73)
NRBC BLD-RTO: 0 /100 WBC
PLATELET # BLD AUTO: 159 K/UL (ref 150–450)
PMV BLD AUTO: 11.4 FL (ref 9.2–12.9)
POTASSIUM SERPL-SCNC: 4.3 MMOL/L (ref 3.5–5.1)
PROT SERPL-MCNC: 7.8 G/DL (ref 6–8.4)
RBC # BLD AUTO: 4 M/UL (ref 4–5.4)
SATURATED IRON: 16 % (ref 20–50)
SODIUM SERPL-SCNC: 135 MMOL/L (ref 136–145)
TOTAL IRON BINDING CAPACITY: 340 UG/DL (ref 250–450)
TRANSFERRIN SERPL-MCNC: 230 MG/DL (ref 200–375)
TSH SERPL DL<=0.005 MIU/L-ACNC: 0.69 UIU/ML (ref 0.4–4)
WBC # BLD AUTO: 6.13 K/UL (ref 3.9–12.7)

## 2022-03-14 PROCEDURE — 82306 VITAMIN D 25 HYDROXY: CPT | Performed by: INTERNAL MEDICINE

## 2022-03-14 PROCEDURE — 82728 ASSAY OF FERRITIN: CPT | Performed by: INTERNAL MEDICINE

## 2022-03-14 PROCEDURE — 84443 ASSAY THYROID STIM HORMONE: CPT | Performed by: INTERNAL MEDICINE

## 2022-03-14 PROCEDURE — 36415 COLL VENOUS BLD VENIPUNCTURE: CPT | Performed by: INTERNAL MEDICINE

## 2022-03-14 PROCEDURE — 85025 COMPLETE CBC W/AUTO DIFF WBC: CPT | Performed by: INTERNAL MEDICINE

## 2022-03-14 PROCEDURE — 80053 COMPREHEN METABOLIC PANEL: CPT | Performed by: INTERNAL MEDICINE

## 2022-03-14 PROCEDURE — 84466 ASSAY OF TRANSFERRIN: CPT | Performed by: INTERNAL MEDICINE

## 2022-03-15 ENCOUNTER — PATIENT MESSAGE (OUTPATIENT)
Dept: INTERNAL MEDICINE | Facility: CLINIC | Age: 42
End: 2022-03-15
Payer: COMMERCIAL

## 2022-03-16 ENCOUNTER — PATIENT MESSAGE (OUTPATIENT)
Dept: ADMINISTRATIVE | Facility: HOSPITAL | Age: 42
End: 2022-03-16
Payer: COMMERCIAL

## 2022-03-16 ENCOUNTER — PATIENT MESSAGE (OUTPATIENT)
Dept: INTERNAL MEDICINE | Facility: CLINIC | Age: 42
End: 2022-03-16
Payer: COMMERCIAL

## 2022-03-16 ENCOUNTER — LAB VISIT (OUTPATIENT)
Dept: LAB | Facility: HOSPITAL | Age: 42
End: 2022-03-16
Attending: INTERNAL MEDICINE
Payer: COMMERCIAL

## 2022-03-16 DIAGNOSIS — R73.9 HYPERGLYCEMIA: ICD-10-CM

## 2022-03-16 LAB
ESTIMATED AVG GLUCOSE: 91 MG/DL (ref 68–131)
HBA1C MFR BLD: 4.8 % (ref 4–5.6)

## 2022-03-16 PROCEDURE — 83036 HEMOGLOBIN GLYCOSYLATED A1C: CPT | Performed by: INTERNAL MEDICINE

## 2022-03-16 PROCEDURE — 36415 COLL VENOUS BLD VENIPUNCTURE: CPT | Performed by: INTERNAL MEDICINE

## 2022-03-30 ENCOUNTER — TELEPHONE (OUTPATIENT)
Dept: OPTOMETRY | Facility: CLINIC | Age: 42
End: 2022-03-30
Payer: COMMERCIAL

## 2022-03-30 NOTE — TELEPHONE ENCOUNTER
----- Message from Cate Ruiz sent at 3/30/2022  2:10 PM CDT -----  Regarding: appt  Patient is wanting to reschedule her appointment that she missed on 03/28/22.  I was unable to schedule in Epic.  Did not give me a calendar.    Arianna  @  [phone]

## 2022-04-11 ENCOUNTER — OFFICE VISIT (OUTPATIENT)
Dept: OBSTETRICS AND GYNECOLOGY | Facility: CLINIC | Age: 42
End: 2022-04-11
Payer: COMMERCIAL

## 2022-04-11 VITALS
BODY MASS INDEX: 20.32 KG/M2 | WEIGHT: 110.44 LBS | SYSTOLIC BLOOD PRESSURE: 112 MMHG | HEIGHT: 62 IN | DIASTOLIC BLOOD PRESSURE: 60 MMHG

## 2022-04-11 DIAGNOSIS — Z12.39 ENCOUNTER FOR SCREENING FOR MALIGNANT NEOPLASM OF BREAST, UNSPECIFIED SCREENING MODALITY: ICD-10-CM

## 2022-04-11 DIAGNOSIS — Z01.411 ENCOUNTER FOR WELL WOMAN EXAM WITH ABNORMAL FINDINGS: Primary | ICD-10-CM

## 2022-04-11 DIAGNOSIS — Z12.4 SCREENING FOR CERVICAL CANCER: ICD-10-CM

## 2022-04-11 DIAGNOSIS — N93.9 ABNORMAL UTERINE BLEEDING (AUB): ICD-10-CM

## 2022-04-11 PROCEDURE — 88175 CYTOPATH C/V AUTO FLUID REDO: CPT | Performed by: OBSTETRICS & GYNECOLOGY

## 2022-04-11 PROCEDURE — 1159F PR MEDICATION LIST DOCUMENTED IN MEDICAL RECORD: ICD-10-PCS | Mod: CPTII,S$GLB,, | Performed by: OBSTETRICS & GYNECOLOGY

## 2022-04-11 PROCEDURE — 3074F SYST BP LT 130 MM HG: CPT | Mod: CPTII,S$GLB,, | Performed by: OBSTETRICS & GYNECOLOGY

## 2022-04-11 PROCEDURE — 3078F PR MOST RECENT DIASTOLIC BLOOD PRESSURE < 80 MM HG: ICD-10-PCS | Mod: CPTII,S$GLB,, | Performed by: OBSTETRICS & GYNECOLOGY

## 2022-04-11 PROCEDURE — 3044F PR MOST RECENT HEMOGLOBIN A1C LEVEL <7.0%: ICD-10-PCS | Mod: CPTII,S$GLB,, | Performed by: OBSTETRICS & GYNECOLOGY

## 2022-04-11 PROCEDURE — 1160F RVW MEDS BY RX/DR IN RCRD: CPT | Mod: CPTII,S$GLB,, | Performed by: OBSTETRICS & GYNECOLOGY

## 2022-04-11 PROCEDURE — 99396 PREV VISIT EST AGE 40-64: CPT | Mod: S$GLB,,, | Performed by: OBSTETRICS & GYNECOLOGY

## 2022-04-11 PROCEDURE — 3044F HG A1C LEVEL LT 7.0%: CPT | Mod: CPTII,S$GLB,, | Performed by: OBSTETRICS & GYNECOLOGY

## 2022-04-11 PROCEDURE — 99396 PR PREVENTIVE VISIT,EST,40-64: ICD-10-PCS | Mod: S$GLB,,, | Performed by: OBSTETRICS & GYNECOLOGY

## 2022-04-11 PROCEDURE — 87624 HPV HI-RISK TYP POOLED RSLT: CPT | Performed by: OBSTETRICS & GYNECOLOGY

## 2022-04-11 PROCEDURE — 1160F PR REVIEW ALL MEDS BY PRESCRIBER/CLIN PHARMACIST DOCUMENTED: ICD-10-PCS | Mod: CPTII,S$GLB,, | Performed by: OBSTETRICS & GYNECOLOGY

## 2022-04-11 PROCEDURE — 3008F BODY MASS INDEX DOCD: CPT | Mod: CPTII,S$GLB,, | Performed by: OBSTETRICS & GYNECOLOGY

## 2022-04-11 PROCEDURE — 3074F PR MOST RECENT SYSTOLIC BLOOD PRESSURE < 130 MM HG: ICD-10-PCS | Mod: CPTII,S$GLB,, | Performed by: OBSTETRICS & GYNECOLOGY

## 2022-04-11 PROCEDURE — 99999 PR PBB SHADOW E&M-EST. PATIENT-LVL III: CPT | Mod: PBBFAC,,, | Performed by: OBSTETRICS & GYNECOLOGY

## 2022-04-11 PROCEDURE — 1159F MED LIST DOCD IN RCRD: CPT | Mod: CPTII,S$GLB,, | Performed by: OBSTETRICS & GYNECOLOGY

## 2022-04-11 PROCEDURE — 3008F PR BODY MASS INDEX (BMI) DOCUMENTED: ICD-10-PCS | Mod: CPTII,S$GLB,, | Performed by: OBSTETRICS & GYNECOLOGY

## 2022-04-11 PROCEDURE — 99999 PR PBB SHADOW E&M-EST. PATIENT-LVL III: ICD-10-PCS | Mod: PBBFAC,,, | Performed by: OBSTETRICS & GYNECOLOGY

## 2022-04-11 PROCEDURE — 3078F DIAST BP <80 MM HG: CPT | Mod: CPTII,S$GLB,, | Performed by: OBSTETRICS & GYNECOLOGY

## 2022-04-11 NOTE — PROGRESS NOTES
Subjective:       Patient ID: Arianna Duval is a 41 y.o. female.    Chief Complaint:  Well Woman    History of Present Illness:  HPI  41 y.o. female  here for annual.     She describes her periods as regular, lasting 4-5 days, heavy flow with passage of clots. Manageable. Sometimes has light breakthrough bleeding.  Noticed a bulge top portion of vagina, not bothersome.  denies vaginal itching or irritation.  denies vaginal discharge.    She is sexually active with 1 partner.  She uses partner vasectomy for contraception.    History of abnormal pap: Yes - - LSIL  Last Pap: 2018 - ASCUS/HPV neg  Last MM2021 - Birads 1  Last Colonoscopy: N/A    Family History   Problem Relation Age of Onset    Breast cancer Maternal Grandmother     Breast cancer Maternal Aunt         great great aunt    Long QT syndrome Mother     Long QT syndrome Brother     Psoriasis Neg Hx     Lupus Neg Hx     Eczema Neg Hx     Acne Neg Hx     Melanoma Neg Hx     Ovarian cancer Neg Hx     Colon cancer Neg Hx          GYN & OB History  Patient's last menstrual period was 03/10/2022 (approximate).   Date of Last Pap: No result found    OB History    Para Term  AB Living   2 2 2     2   SAB IAB Ectopic Multiple Live Births           2      # Outcome Date GA Lbr Fransico/2nd Weight Sex Delivery Anes PTL Lv   2 Term 13 38w5d 08:46 / 00:13 3.375 kg (7 lb 7.1 oz) M Vag-Spont EPI N ENA   1 Term 11 40w0d  3.515 kg (7 lb 12 oz) M Vag-Spont EPI  ENA       Review of Systems  Review of Systems   Constitutional: Negative for chills, diaphoresis, fatigue and fever.   Respiratory: Negative for cough and shortness of breath.    Cardiovascular: Negative for chest pain and palpitations.   Gastrointestinal: Negative for abdominal pain, constipation, diarrhea, nausea and vomiting.   Genitourinary: Negative for dyspareunia, pelvic pain, vaginal bleeding, vaginal discharge and vaginal pain.   Integumentary:  Negative  for breast mass, nipple discharge and breast skin changes.   Neurological: Negative for headaches.   Psychiatric/Behavioral: Negative for depression. The patient is not nervous/anxious.    Breast: Negative for mass, mastodynia, nipple discharge and skin changes          Objective:    Physical Exam:   Constitutional: She is oriented to person, place, and time. She appears well-developed and well-nourished. No distress.    HENT:   Head: Normocephalic and atraumatic.       Pulmonary/Chest: Effort normal. She exhibits no mass and no tenderness. Right breast exhibits no inverted nipple, no mass, no nipple discharge, no skin change, no tenderness and no swelling. Left breast exhibits no inverted nipple, no mass, no nipple discharge, no skin change, no tenderness and no swelling. Breasts are symmetrical.            Abdominal: Soft. She exhibits no distension. There is no abdominal tenderness.     Genitourinary:    Genitourinary Comments: Normal external female genitalia; vagina rugated, normal, urethra palpated; cervix normal, no masses; uterus small mobile nontender; no adnexal masses palpated.             Musculoskeletal: Normal range of motion and moves all extremeties.       Neurological: She is alert and oriented to person, place, and time.    Skin: Skin is warm.    Psychiatric: She has a normal mood and affect. Her behavior is normal. Judgment and thought content normal.          Assessment:        1. Encounter for well woman exam with abnormal findings    2. Screening for cervical cancer    3. Abnormal uterine bleeding (AUB)    4. Encounter for screening for malignant neoplasm of breast, unspecified screening modality               Plan:      Arianna was seen today for annual exam.    Diagnoses and all orders for this visit:    Well woman exam with routine gynecological exam  - Pap/HPV collected.   - Breast cancer screening - MMG ordered.   - Colon cancer screening - age 46.   - Contraception - partner vasectomy.   -  No concerning findings/mass on exam. May just be feeling urethra, reviewed anatomy. Reassurance.   - AUB - Patient was counseled today on the causes of AUB: fibroids, polyps, adenomyosis, anovulation resulting in endometrial hyperplasia, endometritis, cervicitis. We discussed the need for a proper evaluation including TVUS or tissue diagnosis. We discussed the various treatment options: 1) No treatment  2) Medical treatment (NSAIDS -cannot take due to GI reasons, OCPs, Provera, Mirena IUD, Lysteda)  3) Surgical tretament:  D&C, hysteroscopy, endometrial ablation, hysterectomy. The patient expressed understanding of her treatment options and all questions were answered. After informed counseling, she has decided to proceed with pelvic US. During the consultation session all of her questions were answered.  Will consider EMBx in the office if worsens or persistent. Patient unable to take NSAIDs. Declines hormonal contraception.     Orders Placed This Encounter   Procedures    HPV High Risk Genotypes, PCR    Mammo Digital Screening Bilat w/ Eh    US Pelvis Comp with Transvag NON-OB (xpd       Follow up in about 1 year (around 4/11/2023) for annual.

## 2022-04-18 LAB
HPV HR 12 DNA SPEC QL NAA+PROBE: NEGATIVE
HPV16 AG SPEC QL: NEGATIVE
HPV18 DNA SPEC QL NAA+PROBE: NEGATIVE

## 2022-04-18 RX ORDER — POTASSIUM CHLORIDE 20 MEQ/1
TABLET, EXTENDED RELEASE ORAL
Qty: 90 TABLET | Refills: 33 | Status: CANCELLED | OUTPATIENT
Start: 2022-04-18

## 2022-04-18 RX ORDER — METOPROLOL SUCCINATE 25 MG/1
25 TABLET, EXTENDED RELEASE ORAL DAILY
Qty: 90 TABLET | Refills: 3 | OUTPATIENT
Start: 2022-04-18

## 2022-04-19 ENCOUNTER — PATIENT MESSAGE (OUTPATIENT)
Dept: OBSTETRICS AND GYNECOLOGY | Facility: CLINIC | Age: 42
End: 2022-04-19
Payer: COMMERCIAL

## 2022-04-19 LAB
FINAL PATHOLOGIC DIAGNOSIS: NORMAL
Lab: NORMAL

## 2022-05-04 ENCOUNTER — PATIENT MESSAGE (OUTPATIENT)
Dept: ADMINISTRATIVE | Facility: OTHER | Age: 42
End: 2022-05-04
Payer: COMMERCIAL

## 2022-05-04 ENCOUNTER — PATIENT OUTREACH (OUTPATIENT)
Dept: ADMINISTRATIVE | Facility: OTHER | Age: 42
End: 2022-05-04
Payer: COMMERCIAL

## 2022-05-04 ENCOUNTER — OFFICE VISIT (OUTPATIENT)
Dept: OPTOMETRY | Facility: CLINIC | Age: 42
End: 2022-05-04
Payer: COMMERCIAL

## 2022-05-04 DIAGNOSIS — Z46.0 FITTING AND ADJUSTMENT OF SPECTACLES AND CONTACT LENSES: Primary | ICD-10-CM

## 2022-05-04 DIAGNOSIS — H52.13 MYOPIA WITH ASTIGMATISM, BILATERAL: ICD-10-CM

## 2022-05-04 DIAGNOSIS — H52.203 MYOPIA WITH ASTIGMATISM, BILATERAL: ICD-10-CM

## 2022-05-04 DIAGNOSIS — H43.393 VISUAL FLOATERS, BILATERAL: Primary | ICD-10-CM

## 2022-05-04 PROCEDURE — 99999 PR PBB SHADOW E&M-EST. PATIENT-LVL II: ICD-10-PCS | Mod: PBBFAC,,, | Performed by: OPTOMETRIST

## 2022-05-04 PROCEDURE — 99999 PR PBB SHADOW E&M-EST. PATIENT-LVL III: ICD-10-PCS | Mod: PBBFAC,,, | Performed by: OPTOMETRIST

## 2022-05-04 PROCEDURE — 92310 CONTACT LENS FITTING OU: CPT | Mod: CSM,S$GLB,, | Performed by: OPTOMETRIST

## 2022-05-04 PROCEDURE — 99999 PR PBB SHADOW E&M-EST. PATIENT-LVL III: CPT | Mod: PBBFAC,,, | Performed by: OPTOMETRIST

## 2022-05-04 PROCEDURE — 1159F MED LIST DOCD IN RCRD: CPT | Mod: CPTII,S$GLB,, | Performed by: OPTOMETRIST

## 2022-05-04 PROCEDURE — 1160F PR REVIEW ALL MEDS BY PRESCRIBER/CLIN PHARMACIST DOCUMENTED: ICD-10-PCS | Mod: CPTII,S$GLB,, | Performed by: OPTOMETRIST

## 2022-05-04 PROCEDURE — 3044F PR MOST RECENT HEMOGLOBIN A1C LEVEL <7.0%: ICD-10-PCS | Mod: CPTII,S$GLB,, | Performed by: OPTOMETRIST

## 2022-05-04 PROCEDURE — 3044F HG A1C LEVEL LT 7.0%: CPT | Mod: CPTII,S$GLB,, | Performed by: OPTOMETRIST

## 2022-05-04 PROCEDURE — 92014 COMPRE OPH EXAM EST PT 1/>: CPT | Mod: S$GLB,,, | Performed by: OPTOMETRIST

## 2022-05-04 PROCEDURE — 92015 PR REFRACTION: ICD-10-PCS | Mod: S$GLB,,, | Performed by: OPTOMETRIST

## 2022-05-04 PROCEDURE — 99999 PR PBB SHADOW E&M-EST. PATIENT-LVL II: CPT | Mod: PBBFAC,,, | Performed by: OPTOMETRIST

## 2022-05-04 PROCEDURE — 1159F PR MEDICATION LIST DOCUMENTED IN MEDICAL RECORD: ICD-10-PCS | Mod: CPTII,S$GLB,, | Performed by: OPTOMETRIST

## 2022-05-04 PROCEDURE — 1160F RVW MEDS BY RX/DR IN RCRD: CPT | Mod: CPTII,S$GLB,, | Performed by: OPTOMETRIST

## 2022-05-04 PROCEDURE — 92015 DETERMINE REFRACTIVE STATE: CPT | Mod: S$GLB,,, | Performed by: OPTOMETRIST

## 2022-05-04 PROCEDURE — 92014 PR EYE EXAM, EST PATIENT,COMPREHESV: ICD-10-PCS | Mod: S$GLB,,, | Performed by: OPTOMETRIST

## 2022-05-04 PROCEDURE — 92310 PR CONTACT LENS FITTING (NO CHANGE): ICD-10-PCS | Mod: CSM,S$GLB,, | Performed by: OPTOMETRIST

## 2022-05-04 NOTE — PROGRESS NOTES
PROSPER     BRIDGETTE: 01/20  Chief complaint (CC): Patient is here for annual exam today.  Patient   sometimes has clear floaters in her vision that last a few seconds.    Patient has also noticed more trouble with night driving.   Glasses? + 4 yrs. old  Contacts? + comfort is good but vision is not as sharp  H/o eye surgery, injections or laser: -  H/o eye injury: -  Known eye conditions? -  Family h/o eye conditions? -  Eye gtts? -      (-) Flashes (+)  Floaters (-) Mucous   (-)  Tearing (-) Itching (-) Burning   (-) Headaches (-) Eye Pain/discomfort (-) Irritation   (-)  Redness (-) Double vision (-) Blurry vision    Diabetic? -  A1c? -        Last edited by Sandy Church on 5/4/2022 10:39 AM. (History)            Assessment /Plan     For exam results, see Encounter Report.    Visual floaters, bilateral    Myopia with astigmatism, bilateral      1. No e/o h/b/t 360 degrees OU. Monitor for worsening of symptoms or S/Sx of RD.   2. CLRx and SRx released to patient. Patient educated on lens options. Normal ocular health. RTC 1 year for routine exam.

## 2022-05-04 NOTE — PROGRESS NOTES
Care Everywhere: updated  Immunization: updated  Health Maintenance: updated  Media Review:   Legacy Review:   DIS:  Order placed:   Upcoming appts:mammogram 5.31  EFAX:  Task Tickets:Scheduling ticket to schedule annual pcp visit sent to patient's portal   Referrals:

## 2022-05-17 RX ORDER — METOPROLOL SUCCINATE 25 MG/1
25 TABLET, EXTENDED RELEASE ORAL DAILY
Qty: 90 TABLET | Refills: 3 | Status: SHIPPED | OUTPATIENT
Start: 2022-05-17 | End: 2023-02-22

## 2022-05-31 ENCOUNTER — HOSPITAL ENCOUNTER (OUTPATIENT)
Dept: RADIOLOGY | Facility: OTHER | Age: 42
Discharge: HOME | End: 2022-05-31
Attending: OBSTETRICS & GYNECOLOGY
Payer: COMMERCIAL

## 2022-05-31 DIAGNOSIS — N93.9 ABNORMAL UTERINE BLEEDING (AUB): ICD-10-CM

## 2022-05-31 DIAGNOSIS — Z12.39 ENCOUNTER FOR SCREENING FOR MALIGNANT NEOPLASM OF BREAST, UNSPECIFIED SCREENING MODALITY: ICD-10-CM

## 2022-05-31 DIAGNOSIS — Z12.31 VISIT FOR SCREENING MAMMOGRAM: ICD-10-CM

## 2022-05-31 PROCEDURE — 76830 US PELVIS COMP WITH TRANSVAG NON-OB (XPD): ICD-10-PCS | Mod: 26,,, | Performed by: RADIOLOGY

## 2022-05-31 PROCEDURE — 77063 MAMMO DIGITAL SCREENING BILAT WITH TOMO: ICD-10-PCS | Mod: 26,,, | Performed by: RADIOLOGY

## 2022-05-31 PROCEDURE — 77067 SCR MAMMO BI INCL CAD: CPT | Mod: 26,,, | Performed by: RADIOLOGY

## 2022-05-31 PROCEDURE — 77067 MAMMO DIGITAL SCREENING BILAT WITH TOMO: ICD-10-PCS | Mod: 26,,, | Performed by: RADIOLOGY

## 2022-05-31 PROCEDURE — 76856 US PELVIS COMP WITH TRANSVAG NON-OB (XPD): ICD-10-PCS | Mod: 26,,, | Performed by: RADIOLOGY

## 2022-05-31 PROCEDURE — 76830 TRANSVAGINAL US NON-OB: CPT | Mod: 26,,, | Performed by: RADIOLOGY

## 2022-05-31 PROCEDURE — 77063 BREAST TOMOSYNTHESIS BI: CPT | Mod: 26,,, | Performed by: RADIOLOGY

## 2022-05-31 PROCEDURE — 77063 BREAST TOMOSYNTHESIS BI: CPT | Mod: TC

## 2022-05-31 PROCEDURE — 76856 US EXAM PELVIC COMPLETE: CPT | Mod: 26,,, | Performed by: RADIOLOGY

## 2022-05-31 PROCEDURE — 76830 TRANSVAGINAL US NON-OB: CPT | Mod: TC

## 2022-06-20 DIAGNOSIS — Z95.810 PRESENCE OF AUTOMATIC CARDIOVERTER/DEFIBRILLATOR (AICD): Primary | ICD-10-CM

## 2022-06-20 DIAGNOSIS — I45.81 LONG Q-T SYNDROME: ICD-10-CM

## 2022-06-29 ENCOUNTER — PATIENT MESSAGE (OUTPATIENT)
Dept: OBSTETRICS AND GYNECOLOGY | Facility: CLINIC | Age: 42
End: 2022-06-29
Payer: COMMERCIAL

## 2022-06-30 ENCOUNTER — TELEPHONE (OUTPATIENT)
Dept: OBSTETRICS AND GYNECOLOGY | Facility: CLINIC | Age: 42
End: 2022-06-30
Payer: COMMERCIAL

## 2022-06-30 NOTE — TELEPHONE ENCOUNTER
Called pt to inform her that I spoke with radiology, and they said she did not need to repeat her mammogram. Pt verbalized understanding

## 2022-08-01 ENCOUNTER — PATIENT MESSAGE (OUTPATIENT)
Dept: OPTOMETRY | Facility: CLINIC | Age: 42
End: 2022-08-01
Payer: COMMERCIAL

## 2022-08-02 DIAGNOSIS — H00.019 HORDEOLUM EXTERNUM, UNSPECIFIED LATERALITY: Primary | ICD-10-CM

## 2022-08-02 RX ORDER — TOBRAMYCIN 3 MG/ML
SOLUTION/ DROPS OPHTHALMIC
Qty: 5 ML | Refills: 0 | Status: SHIPPED | OUTPATIENT
Start: 2022-08-02

## 2022-08-18 ENCOUNTER — PATIENT MESSAGE (OUTPATIENT)
Dept: INTERNAL MEDICINE | Facility: CLINIC | Age: 42
End: 2022-08-18
Payer: COMMERCIAL

## 2022-08-19 ENCOUNTER — PATIENT MESSAGE (OUTPATIENT)
Dept: GASTROENTEROLOGY | Facility: CLINIC | Age: 42
End: 2022-08-19
Payer: COMMERCIAL

## 2022-08-22 ENCOUNTER — PATIENT MESSAGE (OUTPATIENT)
Dept: GASTROENTEROLOGY | Facility: CLINIC | Age: 42
End: 2022-08-22
Payer: COMMERCIAL

## 2022-08-23 ENCOUNTER — CLINICAL SUPPORT (OUTPATIENT)
Dept: CARDIOLOGY | Facility: HOSPITAL | Age: 42
End: 2022-08-23
Attending: INTERNAL MEDICINE
Payer: COMMERCIAL

## 2022-08-23 DIAGNOSIS — Z95.810 PRESENCE OF AUTOMATIC (IMPLANTABLE) CARDIAC DEFIBRILLATOR: ICD-10-CM

## 2022-08-23 DIAGNOSIS — I45.81 LONG Q-T SYNDROME: Primary | ICD-10-CM

## 2022-08-23 PROCEDURE — 93295 DEV INTERROG REMOTE 1/2/MLT: CPT | Mod: ,,, | Performed by: INTERNAL MEDICINE

## 2022-08-23 PROCEDURE — 93296 REM INTERROG EVL PM/IDS: CPT | Performed by: INTERNAL MEDICINE

## 2022-08-23 PROCEDURE — 93295 CARDIAC DEVICE CHECK - REMOTE: ICD-10-PCS | Mod: ,,, | Performed by: INTERNAL MEDICINE

## 2022-08-24 ENCOUNTER — TELEPHONE (OUTPATIENT)
Dept: ELECTROPHYSIOLOGY | Facility: CLINIC | Age: 42
End: 2022-08-24
Payer: COMMERCIAL

## 2022-08-31 ENCOUNTER — PATIENT MESSAGE (OUTPATIENT)
Dept: ELECTROPHYSIOLOGY | Facility: CLINIC | Age: 42
End: 2022-08-31
Payer: COMMERCIAL

## 2022-10-11 ENCOUNTER — PATIENT MESSAGE (OUTPATIENT)
Dept: ELECTROPHYSIOLOGY | Facility: CLINIC | Age: 42
End: 2022-10-11
Payer: COMMERCIAL

## 2022-10-11 RX ORDER — POTASSIUM CHLORIDE 20 MEQ/1
TABLET, EXTENDED RELEASE ORAL
Qty: 90 TABLET | Refills: 3 | Status: SHIPPED | OUTPATIENT
Start: 2022-10-11 | End: 2023-09-01 | Stop reason: SDUPTHER

## 2022-10-12 ENCOUNTER — PATIENT MESSAGE (OUTPATIENT)
Dept: ELECTROPHYSIOLOGY | Facility: CLINIC | Age: 42
End: 2022-10-12
Payer: COMMERCIAL

## 2022-10-31 ENCOUNTER — TELEPHONE (OUTPATIENT)
Dept: GASTROENTEROLOGY | Facility: CLINIC | Age: 42
End: 2022-10-31
Payer: COMMERCIAL

## 2022-10-31 NOTE — TELEPHONE ENCOUNTER
----- Message from Katina Morel MA sent at 10/31/2022  2:58 PM CDT -----  Contact: pt  She has a Capsule scheduled.  ----- Message -----  From: Cathy Kerr  Sent: 10/31/2022   2:46 PM CDT  To: Brian Ramos Staff    Pt has procedure 11/3 with Dr. Torres. She states she is aware she is suppose to fast starting Wed evening, but is unsure of further instructions for prep. She would like a call back to discuss.     Confirmed contact below:  Contact Name:Arianna Duval  Phone Number: [phone]

## 2022-11-02 ENCOUNTER — TELEPHONE (OUTPATIENT)
Dept: GASTROENTEROLOGY | Facility: CLINIC | Age: 42
End: 2022-11-02
Payer: COMMERCIAL

## 2022-11-02 NOTE — TELEPHONE ENCOUNTER
Spoke with patient.  Clarified the question about clear liquids.   Aware she cannot have red or purple.   Nataliia

## 2022-11-02 NOTE — TELEPHONE ENCOUNTER
----- Message from Katina Morel MA sent at 11/2/2022  1:08 PM CDT -----  Regarding: FW: pt advice    ----- Message -----  From: Meño Sweeney  Sent: 11/2/2022  11:15 AM CDT  To: Brian Ramos Staff  Subject: pt advice                                        Pt is calling to get instruction on which clear liquid is acceptable, please call

## 2022-11-03 ENCOUNTER — TELEPHONE (OUTPATIENT)
Dept: GASTROENTEROLOGY | Facility: CLINIC | Age: 42
End: 2022-11-03
Payer: COMMERCIAL

## 2022-11-03 ENCOUNTER — CLINICAL SUPPORT (OUTPATIENT)
Dept: GASTROENTEROLOGY | Facility: CLINIC | Age: 42
End: 2022-11-03
Payer: COMMERCIAL

## 2022-11-03 DIAGNOSIS — Z87.19 HISTORY OF GI BLEED: ICD-10-CM

## 2022-11-03 DIAGNOSIS — D50.9 IRON DEFICIENCY ANEMIA, UNSPECIFIED IRON DEFICIENCY ANEMIA TYPE: ICD-10-CM

## 2022-11-03 DIAGNOSIS — K63.9 DISORDER OF JEJUNUM: ICD-10-CM

## 2022-11-03 NOTE — TELEPHONE ENCOUNTER
Instructions for the day reviewed with patient and .   All questions answered to patients satisfaction.  Patient swallowed capsule without incident.   Nataliia

## 2022-11-05 ENCOUNTER — PATIENT MESSAGE (OUTPATIENT)
Dept: INTERNAL MEDICINE | Facility: CLINIC | Age: 42
End: 2022-11-05
Payer: COMMERCIAL

## 2022-11-05 DIAGNOSIS — D64.9 ANEMIA, UNSPECIFIED TYPE: Primary | ICD-10-CM

## 2022-11-06 ENCOUNTER — PATIENT MESSAGE (OUTPATIENT)
Dept: INTERNAL MEDICINE | Facility: CLINIC | Age: 42
End: 2022-11-06
Payer: COMMERCIAL

## 2022-11-07 ENCOUNTER — LAB VISIT (OUTPATIENT)
Dept: LAB | Facility: HOSPITAL | Age: 42
End: 2022-11-07
Attending: INTERNAL MEDICINE
Payer: COMMERCIAL

## 2022-11-07 ENCOUNTER — TELEPHONE (OUTPATIENT)
Dept: GASTROENTEROLOGY | Facility: CLINIC | Age: 42
End: 2022-11-07
Payer: COMMERCIAL

## 2022-11-07 ENCOUNTER — PATIENT MESSAGE (OUTPATIENT)
Dept: INTERNAL MEDICINE | Facility: CLINIC | Age: 42
End: 2022-11-07
Payer: COMMERCIAL

## 2022-11-07 ENCOUNTER — PATIENT MESSAGE (OUTPATIENT)
Dept: GASTROENTEROLOGY | Facility: CLINIC | Age: 42
End: 2022-11-07
Payer: COMMERCIAL

## 2022-11-07 DIAGNOSIS — K63.9 SMALL BOWEL LESION: Primary | ICD-10-CM

## 2022-11-07 DIAGNOSIS — D64.9 ANEMIA, UNSPECIFIED TYPE: ICD-10-CM

## 2022-11-07 DIAGNOSIS — D50.9 IRON DEFICIENCY ANEMIA, UNSPECIFIED IRON DEFICIENCY ANEMIA TYPE: ICD-10-CM

## 2022-11-07 LAB
ALBUMIN SERPL BCP-MCNC: 4.6 G/DL (ref 3.5–5.2)
ALP SERPL-CCNC: 55 U/L (ref 55–135)
ALT SERPL W/O P-5'-P-CCNC: 14 U/L (ref 10–44)
ANION GAP SERPL CALC-SCNC: 13 MMOL/L (ref 8–16)
AST SERPL-CCNC: 21 U/L (ref 10–40)
BASOPHILS # BLD AUTO: 0.05 K/UL (ref 0–0.2)
BASOPHILS NFR BLD: 0.9 % (ref 0–1.9)
BILIRUB SERPL-MCNC: 0.9 MG/DL (ref 0.1–1)
BUN SERPL-MCNC: 11 MG/DL (ref 6–20)
CALCIUM SERPL-MCNC: 10.1 MG/DL (ref 8.7–10.5)
CHLORIDE SERPL-SCNC: 100 MMOL/L (ref 95–110)
CO2 SERPL-SCNC: 24 MMOL/L (ref 23–29)
CREAT SERPL-MCNC: 0.8 MG/DL (ref 0.5–1.4)
DIFFERENTIAL METHOD: ABNORMAL
EOSINOPHIL # BLD AUTO: 0.1 K/UL (ref 0–0.5)
EOSINOPHIL NFR BLD: 2 % (ref 0–8)
ERYTHROCYTE [DISTWIDTH] IN BLOOD BY AUTOMATED COUNT: 11.5 % (ref 11.5–14.5)
EST. GFR  (NO RACE VARIABLE): >60 ML/MIN/1.73 M^2
FERRITIN SERPL-MCNC: 62 NG/ML (ref 20–300)
GLUCOSE SERPL-MCNC: 91 MG/DL (ref 70–110)
HCT VFR BLD AUTO: 40.9 % (ref 37–48.5)
HGB BLD-MCNC: 13.5 G/DL (ref 12–16)
IMM GRANULOCYTES # BLD AUTO: 0.02 K/UL (ref 0–0.04)
IMM GRANULOCYTES NFR BLD AUTO: 0.4 % (ref 0–0.5)
IRON SERPL-MCNC: 113 UG/DL (ref 30–160)
LYMPHOCYTES # BLD AUTO: 1.2 K/UL (ref 1–4.8)
LYMPHOCYTES NFR BLD: 21.4 % (ref 18–48)
MCH RBC QN AUTO: 34.4 PG (ref 27–31)
MCHC RBC AUTO-ENTMCNC: 33 G/DL (ref 32–36)
MCV RBC AUTO: 104 FL (ref 82–98)
MONOCYTES # BLD AUTO: 0.6 K/UL (ref 0.3–1)
MONOCYTES NFR BLD: 10.3 % (ref 4–15)
NEUTROPHILS # BLD AUTO: 3.6 K/UL (ref 1.8–7.7)
NEUTROPHILS NFR BLD: 65 % (ref 38–73)
NRBC BLD-RTO: 0 /100 WBC
PLATELET # BLD AUTO: 165 K/UL (ref 150–450)
PMV BLD AUTO: 11.9 FL (ref 9.2–12.9)
POTASSIUM SERPL-SCNC: 4.4 MMOL/L (ref 3.5–5.1)
PROT SERPL-MCNC: 7.8 G/DL (ref 6–8.4)
RBC # BLD AUTO: 3.92 M/UL (ref 4–5.4)
SATURATED IRON: 28 % (ref 20–50)
SODIUM SERPL-SCNC: 137 MMOL/L (ref 136–145)
TOTAL IRON BINDING CAPACITY: 406 UG/DL (ref 250–450)
TRANSFERRIN SERPL-MCNC: 274 MG/DL (ref 200–375)
WBC # BLD AUTO: 5.46 K/UL (ref 3.9–12.7)

## 2022-11-07 PROCEDURE — 36415 COLL VENOUS BLD VENIPUNCTURE: CPT | Performed by: INTERNAL MEDICINE

## 2022-11-07 PROCEDURE — 85025 COMPLETE CBC W/AUTO DIFF WBC: CPT | Performed by: INTERNAL MEDICINE

## 2022-11-07 PROCEDURE — 80053 COMPREHEN METABOLIC PANEL: CPT | Performed by: INTERNAL MEDICINE

## 2022-11-07 PROCEDURE — 84466 ASSAY OF TRANSFERRIN: CPT | Performed by: INTERNAL MEDICINE

## 2022-11-07 PROCEDURE — 82728 ASSAY OF FERRITIN: CPT | Performed by: INTERNAL MEDICINE

## 2022-11-07 NOTE — TELEPHONE ENCOUNTER
----- Message from Toyin Jones MA sent at 11/7/2022  3:50 PM CST -----  Contact: [phone]  Pt is calling to speak with staff about test results.  Pt would like a call back [phone]. Pt also sent a message in the patient portal. Pt states she would not like to wait to hear about this.

## 2022-11-07 NOTE — TELEPHONE ENCOUNTER
I spoke to the patient by phone regarding results of her wireless capsule endoscopy.  Recommend antegrade double balloon enteroscopy for further evaluation to obtain samples from the area of lymphangiectasia.  Is agreeable.  We discussed in detail.  We will make arrangements for the test.

## 2022-11-08 NOTE — PROVATION PATIENT INSTRUCTIONS
Discharge Summary/Instructions for after Video Capsule Endoscopy  Patient Name: Arianna Duval  Patient MRN: [id number]  Patient YOB: 1980  Thursday, November 3, 2022  Richard Torres MD  This is a 42 year old female.  1.  Do Not eat or drink anything for 1 hour.  Try sips of water first.  If   tolerated, resume your regular diet or one recommended by your physician.  2.  Do not drive, operate machinery, make critical decisions, or do   activities that require coordination or balance for 24 hours.  3.   You may experience a sore throat for 24 to 48 hours.  You may use   throat lozenges or gargle with warm salt water to relieve the discomfort.  4.  Because air was put into your stomach during the procedure, you may   experience some belching.  5.  Do not use any medication containing aspirin for 10 days.  6.  Go directly to the emergency room if you notice any of the following:   Chills and/or fever over 101 F   Persistent vomiting or vomiting with blood/nasal regurgitation   Severe abdominal pain, other than gas cramps   Severe chest pain   Black, tarry stools     Your doctor recommends these additional instructions:  Your physician has recommended an upper device-assisted enteroscopy.  If you have any questions or problems, please call your physician.  EMERGENCY PHONE NUMBER: [phone]  LAB RESULTS: [phone]  Richard Torres MD  11/7/2022 3:31:54 PM  This report has been verified and signed electronically.  Dear patient,  As a result of recent federal legislation (The Federal Cures Act), you may   receive lab or pathology results from your procedure in your [facility]   account before your physician is able to contact you. Your physician or   their representative will relay the results to you with their   recommendations at their soonest availability.  Thank you,  PROVATION

## 2022-11-14 ENCOUNTER — PATIENT MESSAGE (OUTPATIENT)
Dept: GASTROENTEROLOGY | Facility: CLINIC | Age: 42
End: 2022-11-14
Payer: COMMERCIAL

## 2022-11-14 ENCOUNTER — PATIENT MESSAGE (OUTPATIENT)
Dept: ENDOSCOPY | Facility: HOSPITAL | Age: 42
End: 2022-11-14
Payer: COMMERCIAL

## 2022-11-14 ENCOUNTER — TELEPHONE (OUTPATIENT)
Dept: ENDOSCOPY | Facility: HOSPITAL | Age: 42
End: 2022-11-14
Payer: COMMERCIAL

## 2022-11-14 NOTE — TELEPHONE ENCOUNTER
Telephoned pt to schedule antegrade double balloon enteroscopy.  Spoke with pt and procedure scheduled for 1/19/23 at 9:30am.  Reviewed medical history and medications.  Instructed on procedure and preparation.  Pt verbalized understanding.  Copy of instructions sent via patient portal.

## 2022-11-21 ENCOUNTER — CLINICAL SUPPORT (OUTPATIENT)
Dept: CARDIOLOGY | Facility: HOSPITAL | Age: 42
End: 2022-11-21
Payer: COMMERCIAL

## 2022-11-21 DIAGNOSIS — I45.81 LONG QT SYNDROME: ICD-10-CM

## 2022-11-21 DIAGNOSIS — Z95.810 PRESENCE OF AUTOMATIC (IMPLANTABLE) CARDIAC DEFIBRILLATOR: ICD-10-CM

## 2022-11-21 DIAGNOSIS — I46.9 CARDIAC ARREST, CAUSE UNSPECIFIED: ICD-10-CM

## 2022-11-21 PROCEDURE — 93296 REM INTERROG EVL PM/IDS: CPT | Performed by: INTERNAL MEDICINE

## 2023-01-17 NOTE — PROGRESS NOTES
Subjective:    Patient ID:  Arianna Duval is a 42 y.o. 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     Background:     SCD episode occurred 6 wks post-partum, triggered with startle + stress. Dual chamber ICD implanted 6/10/11 (Dr. Hyman), placed on Toprol. Has not had events to date.   Device interrogation reveals stable function of leads, RA pacing 47%, no RV pacing.  At her last clinic appt 6/2017 she was feeling great. Was considering breast lift, has questions regarding timing of this compared to generator change.  Also noted device is dropping a bit.  Dr. Bryson advised to proceed with breast lift before gen change. Rate was to be decreased to 50bpm to preserve battery. Reduced to 55 in clinic, as of device check 2/2018 it is still 55.     Had recurrent VF in the setting of electrolyte abnormalities and appendicitis.     9/20/2018 generator change and pocket revision.      Update:  Doing well overall. Exercising without difficulty.    Device interrogation reveals stable function of the leads, RA pacing < 1%, RV pacing < 1%, no significant arrhythmias.  ECG reveal nsr with QTc 460 msec.      Review of Systems   Constitutional: Negative. Negative for fever and malaise/fatigue.   HENT:  Negative for congestion and sore throat.    Cardiovascular:  Negative for chest pain, dyspnea on exertion, irregular heartbeat, leg swelling, near-syncope, orthopnea, palpitations, paroxysmal nocturnal dyspnea and syncope.   Respiratory:  Negative for cough and shortness of breath.    Gastrointestinal:  Negative for abdominal pain, constipation and diarrhea.   Neurological:  Negative for dizziness, light-headedness and weakness.   Psychiatric/Behavioral:  Negative for depression. The patient is not nervous/anxious.    All other systems reviewed and are  negative.     Objective:    Physical Exam  Constitutional:       Appearance: She is well-developed.   Eyes:      General: No scleral icterus.     Conjunctiva/sclera: Conjunctivae normal.   Neck:      Vascular: No JVD.      Trachea: No tracheal deviation.   Cardiovascular:      Rate and Rhythm: Normal rate and regular rhythm.      Chest Wall: PMI is not displaced.   Pulmonary:      Effort: Pulmonary effort is normal. No respiratory distress.      Breath sounds: Normal breath sounds.   Abdominal:      Palpations: Abdomen is soft.      Tenderness: There is no abdominal tenderness.   Musculoskeletal:         General: No tenderness.   Skin:     General: Skin is warm and dry.      Findings: No rash.   Neurological:      Mental Status: She is alert and oriented to person, place, and time.   Psychiatric:         Behavior: Behavior normal.         Assessment:       1. Long Q-T syndrome    2. Automatic implantable cardioverter-defibrillator in situ         Plan:           Doing great.  Add Magnesium.  F/u with monitoring as scheduled, with me in one year.

## 2023-01-18 ENCOUNTER — OFFICE VISIT (OUTPATIENT)
Dept: ELECTROPHYSIOLOGY | Facility: CLINIC | Age: 43
End: 2023-01-18
Payer: COMMERCIAL

## 2023-01-18 ENCOUNTER — CLINICAL SUPPORT (OUTPATIENT)
Dept: CARDIOLOGY | Facility: HOSPITAL | Age: 43
End: 2023-01-18
Attending: INTERNAL MEDICINE
Payer: COMMERCIAL

## 2023-01-18 VITALS
HEIGHT: 62 IN | BODY MASS INDEX: 20.65 KG/M2 | WEIGHT: 112.19 LBS | SYSTOLIC BLOOD PRESSURE: 119 MMHG | HEART RATE: 61 BPM | DIASTOLIC BLOOD PRESSURE: 58 MMHG

## 2023-01-18 DIAGNOSIS — Z95.810 AUTOMATIC IMPLANTABLE CARDIOVERTER-DEFIBRILLATOR IN SITU: ICD-10-CM

## 2023-01-18 DIAGNOSIS — I45.81 LONG Q-T SYNDROME: Primary | ICD-10-CM

## 2023-01-18 DIAGNOSIS — I45.81 LONG Q-T SYNDROME: ICD-10-CM

## 2023-01-18 DIAGNOSIS — Z95.810 PRESENCE OF AUTOMATIC CARDIOVERTER/DEFIBRILLATOR (AICD): ICD-10-CM

## 2023-01-18 PROCEDURE — 3008F BODY MASS INDEX DOCD: CPT | Mod: CPTII,S$GLB,, | Performed by: INTERNAL MEDICINE

## 2023-01-18 PROCEDURE — 3074F SYST BP LT 130 MM HG: CPT | Mod: CPTII,S$GLB,, | Performed by: INTERNAL MEDICINE

## 2023-01-18 PROCEDURE — 93005 ELECTROCARDIOGRAM TRACING: CPT | Mod: S$GLB,,, | Performed by: INTERNAL MEDICINE

## 2023-01-18 PROCEDURE — 93005 RHYTHM STRIP: ICD-10-PCS | Mod: S$GLB,,, | Performed by: INTERNAL MEDICINE

## 2023-01-18 PROCEDURE — 99999 PR PBB SHADOW E&M-EST. PATIENT-LVL III: CPT | Mod: PBBFAC,,, | Performed by: INTERNAL MEDICINE

## 2023-01-18 PROCEDURE — 93283 CARDIAC DEVICE CHECK - IN CLINIC & HOSPITAL: ICD-10-PCS | Mod: 26,,, | Performed by: INTERNAL MEDICINE

## 2023-01-18 PROCEDURE — 93010 RHYTHM STRIP: ICD-10-PCS | Mod: S$GLB,,, | Performed by: INTERNAL MEDICINE

## 2023-01-18 PROCEDURE — 99215 OFFICE O/P EST HI 40 MIN: CPT | Mod: S$GLB,,, | Performed by: INTERNAL MEDICINE

## 2023-01-18 PROCEDURE — 1159F PR MEDICATION LIST DOCUMENTED IN MEDICAL RECORD: ICD-10-PCS | Mod: CPTII,S$GLB,, | Performed by: INTERNAL MEDICINE

## 2023-01-18 PROCEDURE — 1159F MED LIST DOCD IN RCRD: CPT | Mod: CPTII,S$GLB,, | Performed by: INTERNAL MEDICINE

## 2023-01-18 PROCEDURE — 93283 PRGRMG EVAL IMPLANTABLE DFB: CPT | Mod: 26,,, | Performed by: INTERNAL MEDICINE

## 2023-01-18 PROCEDURE — 3074F PR MOST RECENT SYSTOLIC BLOOD PRESSURE < 130 MM HG: ICD-10-PCS | Mod: CPTII,S$GLB,, | Performed by: INTERNAL MEDICINE

## 2023-01-18 PROCEDURE — 3008F PR BODY MASS INDEX (BMI) DOCUMENTED: ICD-10-PCS | Mod: CPTII,S$GLB,, | Performed by: INTERNAL MEDICINE

## 2023-01-18 PROCEDURE — 99999 PR PBB SHADOW E&M-EST. PATIENT-LVL III: ICD-10-PCS | Mod: PBBFAC,,, | Performed by: INTERNAL MEDICINE

## 2023-01-18 PROCEDURE — 3078F PR MOST RECENT DIASTOLIC BLOOD PRESSURE < 80 MM HG: ICD-10-PCS | Mod: CPTII,S$GLB,, | Performed by: INTERNAL MEDICINE

## 2023-01-18 PROCEDURE — 99215 PR OFFICE/OUTPT VISIT, EST, LEVL V, 40-54 MIN: ICD-10-PCS | Mod: S$GLB,,, | Performed by: INTERNAL MEDICINE

## 2023-01-18 PROCEDURE — 93283 PRGRMG EVAL IMPLANTABLE DFB: CPT

## 2023-01-18 PROCEDURE — 3078F DIAST BP <80 MM HG: CPT | Mod: CPTII,S$GLB,, | Performed by: INTERNAL MEDICINE

## 2023-01-18 PROCEDURE — 93010 ELECTROCARDIOGRAM REPORT: CPT | Mod: S$GLB,,, | Performed by: INTERNAL MEDICINE

## 2023-01-19 ENCOUNTER — DOCUMENTATION ONLY (OUTPATIENT)
Dept: CARDIOLOGY | Facility: HOSPITAL | Age: 43
End: 2023-01-19
Payer: COMMERCIAL

## 2023-01-19 ENCOUNTER — HOSPITAL ENCOUNTER (OUTPATIENT)
Facility: HOSPITAL | Age: 43
Discharge: HOME | End: 2023-01-19
Attending: INTERNAL MEDICINE | Admitting: INTERNAL MEDICINE
Payer: COMMERCIAL

## 2023-01-19 ENCOUNTER — ANESTHESIA (OUTPATIENT)
Dept: ENDOSCOPY | Facility: HOSPITAL | Age: 43
End: 2023-01-19
Payer: COMMERCIAL

## 2023-01-19 ENCOUNTER — ANESTHESIA EVENT (OUTPATIENT)
Dept: ENDOSCOPY | Facility: HOSPITAL | Age: 43
End: 2023-01-19
Payer: COMMERCIAL

## 2023-01-19 VITALS
DIASTOLIC BLOOD PRESSURE: 75 MMHG | TEMPERATURE: 97 F | WEIGHT: 110 LBS | HEIGHT: 62 IN | SYSTOLIC BLOOD PRESSURE: 134 MMHG | RESPIRATION RATE: 37 BRPM | OXYGEN SATURATION: 100 % | HEART RATE: 55 BPM | BODY MASS INDEX: 20.24 KG/M2

## 2023-01-19 DIAGNOSIS — I89.0 LYMPHANGIECTASIA: Primary | ICD-10-CM

## 2023-01-19 LAB
B-HCG UR QL: NEGATIVE
CTP QC/QA: YES

## 2023-01-19 PROCEDURE — D9220A PRA ANESTHESIA: Mod: ANES,,, | Performed by: ANESTHESIOLOGY

## 2023-01-19 PROCEDURE — 44376 PR SB SCOPE,TO ILEUM,DIAGNOSTIC: ICD-10-PCS | Mod: ,,, | Performed by: INTERNAL MEDICINE

## 2023-01-19 PROCEDURE — D9220A PRA ANESTHESIA: ICD-10-PCS | Mod: CRNA,,, | Performed by: NURSE ANESTHETIST, CERTIFIED REGISTERED

## 2023-01-19 PROCEDURE — 27201028 HC NEEDLE, SCLERO: Performed by: INTERNAL MEDICINE

## 2023-01-19 PROCEDURE — 44799 ENTEROSCOPY WITH SUBMUCOSAL INJECTIONS: ICD-10-PCS | Mod: ,,, | Performed by: INTERNAL MEDICINE

## 2023-01-19 PROCEDURE — 44376 SMALL BOWEL ENDOSCOPY: CPT | Performed by: INTERNAL MEDICINE

## 2023-01-19 PROCEDURE — 27202363 HC INJECTION AGENT, SUBMUCOSAL, ANY: Performed by: INTERNAL MEDICINE

## 2023-01-19 PROCEDURE — 81025 URINE PREGNANCY TEST: CPT | Performed by: INTERNAL MEDICINE

## 2023-01-19 PROCEDURE — 63600175 PHARM REV CODE 636 W HCPCS: Performed by: NURSE ANESTHETIST, CERTIFIED REGISTERED

## 2023-01-19 PROCEDURE — 37000009 HC ANESTHESIA EA ADD 15 MINS: Performed by: INTERNAL MEDICINE

## 2023-01-19 PROCEDURE — D9220A PRA ANESTHESIA: ICD-10-PCS | Mod: ANES,,, | Performed by: ANESTHESIOLOGY

## 2023-01-19 PROCEDURE — 27201030 HC OVERTUBE: Performed by: INTERNAL MEDICINE

## 2023-01-19 PROCEDURE — D9220A PRA ANESTHESIA: Mod: CRNA,,, | Performed by: NURSE ANESTHETIST, CERTIFIED REGISTERED

## 2023-01-19 PROCEDURE — 44376 SMALL BOWEL ENDOSCOPY: CPT | Mod: ,,, | Performed by: INTERNAL MEDICINE

## 2023-01-19 PROCEDURE — 25000003 PHARM REV CODE 250: Performed by: INTERNAL MEDICINE

## 2023-01-19 PROCEDURE — 44799 UNLISTED PX SMALL INTESTINE: CPT | Mod: ,,, | Performed by: INTERNAL MEDICINE

## 2023-01-19 PROCEDURE — 25000003 PHARM REV CODE 250: Performed by: NURSE ANESTHETIST, CERTIFIED REGISTERED

## 2023-01-19 PROCEDURE — 37000008 HC ANESTHESIA 1ST 15 MINUTES: Performed by: INTERNAL MEDICINE

## 2023-01-19 PROCEDURE — 44799 UNLISTED PX SMALL INTESTINE: CPT | Performed by: INTERNAL MEDICINE

## 2023-01-19 RX ORDER — DEXAMETHASONE SODIUM PHOSPHATE 4 MG/ML
INJECTION, SOLUTION INTRA-ARTICULAR; INTRALESIONAL; INTRAMUSCULAR; INTRAVENOUS; SOFT TISSUE
Status: DISCONTINUED | OUTPATIENT
Start: 2023-01-19 | End: 2023-01-19

## 2023-01-19 RX ORDER — PROPOFOL 10 MG/ML
INJECTION, EMULSION INTRAVENOUS
Status: DISCONTINUED | OUTPATIENT
Start: 2023-01-19 | End: 2023-01-19

## 2023-01-19 RX ORDER — PHENYLEPHRINE HYDROCHLORIDE 10 MG/ML
INJECTION INTRAVENOUS
Status: DISCONTINUED | OUTPATIENT
Start: 2023-01-19 | End: 2023-01-19

## 2023-01-19 RX ORDER — SUCCINYLCHOLINE CHLORIDE 20 MG/ML
INJECTION INTRAMUSCULAR; INTRAVENOUS
Status: DISCONTINUED | OUTPATIENT
Start: 2023-01-19 | End: 2023-01-19

## 2023-01-19 RX ORDER — ONDANSETRON 2 MG/ML
INJECTION INTRAMUSCULAR; INTRAVENOUS
Status: DISCONTINUED | OUTPATIENT
Start: 2023-01-19 | End: 2023-01-19

## 2023-01-19 RX ORDER — LIDOCAINE HCL/PF 100 MG/5ML
SYRINGE (ML) INTRAVENOUS
Status: DISCONTINUED | OUTPATIENT
Start: 2023-01-19 | End: 2023-01-19

## 2023-01-19 RX ORDER — SODIUM CHLORIDE 9 MG/ML
INJECTION, SOLUTION INTRAVENOUS CONTINUOUS
Status: DISCONTINUED | OUTPATIENT
Start: 2023-01-19 | End: 2023-01-19 | Stop reason: HOSPADM

## 2023-01-19 RX ORDER — MIDAZOLAM HYDROCHLORIDE 1 MG/ML
INJECTION, SOLUTION INTRAMUSCULAR; INTRAVENOUS
Status: DISCONTINUED | OUTPATIENT
Start: 2023-01-19 | End: 2023-01-19

## 2023-01-19 RX ORDER — HYDROMORPHONE HYDROCHLORIDE 1 MG/ML
0.2 INJECTION, SOLUTION INTRAMUSCULAR; INTRAVENOUS; SUBCUTANEOUS EVERY 5 MIN PRN
Status: DISCONTINUED | OUTPATIENT
Start: 2023-01-19 | End: 2023-01-19 | Stop reason: HOSPADM

## 2023-01-19 RX ORDER — FENTANYL CITRATE 50 UG/ML
INJECTION, SOLUTION INTRAMUSCULAR; INTRAVENOUS
Status: DISCONTINUED | OUTPATIENT
Start: 2023-01-19 | End: 2023-01-19

## 2023-01-19 RX ORDER — SODIUM CHLORIDE 0.9 % (FLUSH) 0.9 %
3 SYRINGE (ML) INJECTION
Status: DISCONTINUED | OUTPATIENT
Start: 2023-01-19 | End: 2023-01-19 | Stop reason: HOSPADM

## 2023-01-19 RX ORDER — ROCURONIUM BROMIDE 10 MG/ML
INJECTION, SOLUTION INTRAVENOUS
Status: DISCONTINUED | OUTPATIENT
Start: 2023-01-19 | End: 2023-01-19

## 2023-01-19 RX ADMIN — MIDAZOLAM HYDROCHLORIDE 2 MG: 1 INJECTION, SOLUTION INTRAMUSCULAR; INTRAVENOUS at 09:01

## 2023-01-19 RX ADMIN — SUCCINYLCHOLINE CHLORIDE 160 MG: 20 INJECTION, SOLUTION INTRAMUSCULAR; INTRAVENOUS at 09:01

## 2023-01-19 RX ADMIN — FENTANYL CITRATE 100 MCG: 50 INJECTION, SOLUTION INTRAMUSCULAR; INTRAVENOUS at 09:01

## 2023-01-19 RX ADMIN — Medication 60 MG: at 09:01

## 2023-01-19 RX ADMIN — GLYCOPYRROLATE 0.2 MG: 0.2 INJECTION, SOLUTION INTRAMUSCULAR; INTRAVENOUS at 09:01

## 2023-01-19 RX ADMIN — ROCURONIUM BROMIDE 10 MG: 10 INJECTION INTRAVENOUS at 09:01

## 2023-01-19 RX ADMIN — DEXAMETHASONE SODIUM PHOSPHATE 4 MG: 4 INJECTION, SOLUTION INTRAMUSCULAR; INTRAVENOUS at 09:01

## 2023-01-19 RX ADMIN — ONDANSETRON 4 MG: 2 INJECTION INTRAMUSCULAR; INTRAVENOUS at 09:01

## 2023-01-19 RX ADMIN — PHENYLEPHRINE HYDROCHLORIDE 100 MCG: 10 INJECTION INTRAVENOUS at 10:01

## 2023-01-19 RX ADMIN — SODIUM CHLORIDE: 0.9 INJECTION, SOLUTION INTRAVENOUS at 09:01

## 2023-01-19 RX ADMIN — PROPOFOL 190 MG: 10 INJECTION, EMULSION INTRAVENOUS at 09:01

## 2023-01-19 NOTE — H&P
Short Stay Endoscopy History and Physical      Procedure - Antegrade double-balloon enteroscopy  ASA - per anesthesia  Mallampati - per anesthesia  History of Anesthesia problems - no  Family history Anesthesia problems - no   Plan of anesthesia - General    HPI:  This is a 42 y.o. 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 No current GI complaints.  Prior abdominal pain has resolved.       ROS:  Constitutional: No fevers, chills, No weight loss  CV: No chest pain  Pulm: No cough, No shortness of breath  GI: see HPI    Medical History:  has a past medical history of Abnormal Pap smear of cervix, Acute blood loss anemia (8/31/2013), Defibrillator discharge, Dysplastic nevus (01/07/2021), and Long Q-T syndrome.    Surgical History:  has a past surgical history that includes breast reduction; Cardiac pacemaker placement; Colposcopy; replacement of implantable cardioverter-defibrillator (icd) generator (N/A, 9/20/2018); Laparoscopic appendectomy (N/A, 3/18/2020); Esophagogastroduodenoscopy (N/A, 3/2/2021); and Colonoscopy (N/A, 3/8/2021).    Family History: family history includes Breast cancer in her maternal aunt and maternal grandmother; Long QT syndrome in her brother and mother.    Social History:  reports that she has never smoked. She has never used smokeless tobacco. She reports current alcohol use. She reports that she does not use drugs.    Review of patient's allergies indicates:   Allergen Reactions    Sulfa (sulfonamide antibiotics)        Medications:   Medications Prior to Admission   Medication Sig Dispense Refill Last Dose    ferrous sulfate 324 mg (65 mg iron) TbEC Take 324 mg by mouth once daily.   1/18/2023    iron,carbonyl/ascorbic acid (VITRON-C ORAL) Take 1 tablet by mouth once daily.   1/18/2023    metoprolol succinate (TOPROL-XL) 25 MG 24 hr tablet Take 1 tablet (25 mg total) by mouth once daily. 90 tablet 3 1/18/2023    mv,Ca,min/iron/FA/guarana/caff (ONE-A-DAY  WOMEN'S ACTIVE ORAL) Take by mouth.   1/18/2023    potassium chloride SA (K-DUR,KLOR-CON) 20 MEQ tablet TAKE 1 TABLET BY MOUTH EVERY DAY 90 tablet 3 1/18/2023    vitamin D (VITAMIN D3) 1000 units Tab Take 1,000 Units by mouth once daily.   1/18/2023    COVID-19 test specimen collect Misc TEST AS DIRECTED       omega-3 fatty acids/fish oil (FISH OIL-OMEGA-3 FATTY ACIDS) 300-1,000 mg capsule Take by mouth once daily.            Physical Exam:    Vital Signs:   Vitals:    01/19/23 0855   BP: (!) 145/82   Pulse: 73   Resp: 18   Temp: 98.2 °F (36.8 °C)       General Appearance: Well appearing in no acute distress  Eyes:  PERRL  Lungs: CTA bilaterally  Heart:  Reg rate and rhythm  Abdomen: Soft, non tender, non distended with positive bowel sounds.      Labs:  Lab Results   Component Value Date    WBC 5.46 11/07/2022    HGB 13.5 11/07/2022    HCT 40.9 11/07/2022     (H) 11/07/2022     11/07/2022        BMP  Lab Results   Component Value Date     11/07/2022    K 4.4 11/07/2022     11/07/2022    CO2 24 11/07/2022    BUN 11 11/07/2022    CREATININE 0.8 11/07/2022    CALCIUM 10.1 11/07/2022    ANIONGAP 13 11/07/2022    ESTGFRAFRICA >60.0 03/14/2022    EGFRNONAA >60.0 03/14/2022     Lab Results   Component Value Date    INR 1.0 02/28/2021    INR 1.0 03/18/2020    INR 1.2 03/17/2020          Assessment:  42 y.o. female with lymphangiectasia of the small bowel.     Plan:  Proceed with antegrade double-balloon enteroscopy today.  I have explained the risks and benefits of endoscopy procedures to the patient including but not limited to bleeding, perforation, infection, and death.  All questions and answered.        Richard Torres MD

## 2023-01-19 NOTE — ANESTHESIA PREPROCEDURE EVALUATION
01/19/2023  Arianna Duval is a 42 y.o., female.      Pre-op Assessment    I have reviewed the Patient Summary Reports.          Review of Systems  Anesthesia Hx:  No problems with previous Anesthesia    Social:  Non-Smoker    Hematology/Oncology:  Hematology Normal   Oncology Normal     EENT/Dental:EENT/Dental Normal   Cardiovascular:   Pacemaker Dysrhythmias (Long QT syndrome)    Pulmonary:  Pulmonary Normal    Renal/:  Renal/ Normal     Hepatic/GI:  Hepatic/GI Normal    Musculoskeletal:  Musculoskeletal Normal    Neurological:  Neurology Normal    Endocrine:  Endocrine Normal    Dermatological:  Skin Normal    Psych:  Psychiatric Normal           Physical Exam  General: Alert and Oriented    Airway:  Mallampati: II / II  Mouth Opening: Normal  TM Distance: Normal  Tongue: Normal  Neck ROM: Normal ROM    Dental:  Intact    Chest/Lungs:  Clear to auscultation, Normal Respiratory Rate    Heart:  Rate: Normal  Rhythm: Regular Rhythm  Sounds: Normal        Anesthesia Plan  Type of Anesthesia, risks & benefits discussed:    Anesthesia Type: Gen ETT, MAC  Intra-op Monitoring Plan: Standard ASA Monitors  Post Op Pain Control Plan: multimodal analgesia  Airway Plan: Direct  Informed Consent: Informed consent signed with the Patient and all parties understand the risks and agree with anesthesia plan.  All questions answered.   ASA Score: 3    Ready For Surgery From Anesthesia Perspective.     .

## 2023-01-19 NOTE — PROGRESS NOTES
Patient has been identified as having an implanted cardiac rhythm device (CRD); the implanted device is a MDT defibrillator.      No noted pacer dependency.          DEFIBRILLATORS/NON-DEPENDENT ANY LOCATION    Per protocol,a magnet should be applied directly over the implanted device during entire surgical procedure when electrocautery will be used.    If no electrocautery is planned, magnet application is not needed.      For additional questions, please contact the Arrhythmia Department at Ext [phone].

## 2023-01-19 NOTE — PLAN OF CARE
Discharge instructions given, patient and family member, verbalized understanding. Consents verified. Vitals back to baseline. Pt tolerating ice chips. Resting at this time

## 2023-01-19 NOTE — PROVATION PATIENT INSTRUCTIONS
Discharge Summary/Instructions after an Endoscopic Procedure  Patient Name: Arianna Duval  Patient MRN: [id number]  Patient YOB: 1980  Thursday, January 19, 2023  Richard Torres MD  Dear patient,  As a result of recent federal legislation (The Federal Cures Act), you may   receive lab or pathology results from your procedure in your [facility]   account before your physician is able to contact you. Your physician or   their representative will relay the results to you with their   recommendations at their soonest availability.  Thank you,  RESTRICTIONS:  During your procedure today, you received medications for sedation.  These   medications may affect your judgment, balance and coordination.  Therefore,   for 24 hours, you have the following restrictions:   - DO NOT drive a car, operate machinery, make legal/financial decisions,   sign important papers or drink alcohol.    ACTIVITY:  Today: no heavy lifting, straining or running due to procedural   sedation/anesthesia.  The following day: return to full activity including work.  DIET:  Eat and drink normally unless instructed otherwise.     TREATMENT FOR COMMON SIDE EFFECTS:  - Mild abdominal pain, nausea, belching, bloating or excessive gas:  rest,   eat lightly and use a heating pad.  - Sore Throat: treat with throat lozenges and/or gargle with warm salt   water.  - Because air was used during the procedure, expelling large amounts of air   from your rectum or belching is normal.  - If a bowel prep was taken, you may not have a bowel movement for 1-3 days.    This is normal.  SYMPTOMS TO WATCH FOR AND REPORT TO YOUR PHYSICIAN:  1. Abdominal pain or bloating, other than gas cramps.  2. Chest pain.  3. Back pain.  4. Signs of infection such as: chills or fever occurring within 24 hours   after the procedure.  5. Rectal bleeding, which would show as bright red, maroon, or black stools.   (A tablespoon of blood from the rectum is not serious, especially  if   hemorrhoids are present.)  6. Vomiting.  7. Weakness or dizziness.  GO DIRECTLY TO THE NEAREST EMERGENCY ROOM IF YOU HAVE ANY OF THE FOLLOWING:      Difficulty breathing              Chills and/or fever over 101 F   Persistent vomiting and/or vomiting blood   Severe abdominal pain   Severe chest pain   Black, tarry stools   Bleeding- more than one tablespoon   Any other symptom or condition that you feel may need urgent attention  Your doctor recommends these additional instructions:  If any biopsies were taken, your doctors clinic will contact you in 1 to 2   weeks with any results.  - Discharge patient to home.   - Patient has a contact number available for emergencies.  The signs and   symptoms of potential delayed complications were discussed with the   patient.  Return to normal activities tomorrow.  Written discharge   instructions were provided to the patient.   - Resume previous diet.   - Continue present medications.   - Return to GI clinic PRN.  No further evaluation or intervention needed.      For questions, problems or results please call your physician - Richard Torres MD at Work:  [phone].  [facility] [place] [facility], EMERGENCY ROOM PHONE NUMBER: [phone]  IF A COMPLICATION OR EMERGENCY SITUATION ARISES AND YOU ARE UNABLE TO REACH   YOUR PHYSICIAN - GO DIRECTLY TO THE EMERGENCY ROOM.  Richard Torres MD  1/19/2023 11:08:46 AM  This report has been verified and signed electronically.  Dear patient,  As a result of recent federal legislation (The Federal Cures Act), you may   receive lab or pathology results from your procedure in your [facility]   account before your physician is able to contact you. Your physician or   their representative will relay the results to you with their   recommendations at their soonest availability.  Thank you,  PROVATION

## 2023-01-19 NOTE — TRANSFER OF CARE
"Anesthesia Transfer of Care Note    Patient: Arianna Duval    Procedure(s) Performed: Procedure(s) (LRB):  ENTEROSCOPY, DOUBLE BALLOON, ANTEGRADE (N/A)    Patient location: [facility]    Anesthesia Type: general    Transport from OR: Transported from OR on room air with adequate spontaneous ventilation    Post pain: adequate analgesia    Post assessment: no apparent anesthetic complications    Post vital signs: stable    Level of consciousness: awake    Nausea/Vomiting: no nausea/vomiting    Complications: none    Transfer of care protocol was followed      Last vitals:   Visit Vitals  /69   Pulse 81   Temp 36.3 °C (97.3 °F) (Temporal)   Resp 16   Ht 5' 2" (1.575 m)   Wt 49.9 kg (110 lb)   SpO2 100%   Breastfeeding No   BMI 20.12 kg/m²     "

## 2023-01-19 NOTE — ANESTHESIA PROCEDURE NOTES
Intubation    Date/Time: 1/19/2023 9:34 AM  Performed by: Gertrude Don CRNA  Authorized by: Rey Brennan MD     Intubation:     Induction:  Intravenous    Intubated:  Postinduction    Mask Ventilation:  Easy mask    Attempts:  1    Attempted By:  CRNA    Method of Intubation:  Direct    Blade:  Yanes 2    Laryngeal View Grade: Grade I - full view of cords      Difficult Airway Encountered?: No      Complications:  None    Airway Device:  Oral endotracheal tube    Airway Device Size:  7.0    Style/Cuff Inflation:  Cuffed (inflated to minimal occlusive pressure)    Tube secured:  22    Secured at:  The lips    Placement Verified By:  Capnometry    Complicating Factors:  None    Findings Post-Intubation:  BS equal bilateral

## 2023-01-20 NOTE — ANESTHESIA POSTPROCEDURE EVALUATION
Anesthesia Post Evaluation    Patient: Arianna Duval    Procedure(s) Performed: Procedure(s) (LRB):  ENTEROSCOPY, DOUBLE BALLOON, ANTEGRADE (N/A)    Final Anesthesia Type: general      Patient location during evaluation: PACU  Patient participation: Yes- Able to Participate  Level of consciousness: awake and alert  Post-procedure vital signs: reviewed and stable  Pain control: Pain has been treated.  Airway patency: patent    PONV status: PONV absent or treated.  Anesthetic complications: no      Cardiovascular status: hemodynamically stable  Respiratory status: spontaneous ventilation  Hydration status: euvolemic  Follow-up not needed.          Vitals Value Taken Time   /75 01/19/23 1200   Temp 36.3 °C (97.3 °F) 01/19/23 1107   Pulse 55 01/19/23 1200   Resp 37 01/19/23 1200   SpO2 100 % 01/19/23 1200         No case tracking events are documented in the log.      Pain/Babar Score: Babar Score: 10 (1/19/2023 11:15 AM)

## 2023-02-14 ENCOUNTER — LAB VISIT (OUTPATIENT)
Dept: LAB | Facility: HOSPITAL | Age: 43
End: 2023-02-14
Attending: INTERNAL MEDICINE
Payer: COMMERCIAL

## 2023-02-14 ENCOUNTER — PATIENT MESSAGE (OUTPATIENT)
Dept: INTERNAL MEDICINE | Facility: CLINIC | Age: 43
End: 2023-02-14

## 2023-02-14 ENCOUNTER — OFFICE VISIT (OUTPATIENT)
Dept: INTERNAL MEDICINE | Facility: CLINIC | Age: 43
End: 2023-02-14
Payer: COMMERCIAL

## 2023-02-14 VITALS
WEIGHT: 111.69 LBS | OXYGEN SATURATION: 100 % | SYSTOLIC BLOOD PRESSURE: 130 MMHG | DIASTOLIC BLOOD PRESSURE: 80 MMHG | HEART RATE: 70 BPM | BODY MASS INDEX: 20.55 KG/M2 | HEIGHT: 62 IN

## 2023-02-14 DIAGNOSIS — D50.9 IRON DEFICIENCY ANEMIA, UNSPECIFIED IRON DEFICIENCY ANEMIA TYPE: ICD-10-CM

## 2023-02-14 DIAGNOSIS — Z00.00 ROUTINE GENERAL MEDICAL EXAMINATION AT A HEALTH CARE FACILITY: Primary | ICD-10-CM

## 2023-02-14 DIAGNOSIS — Z00.00 ROUTINE GENERAL MEDICAL EXAMINATION AT A HEALTH CARE FACILITY: ICD-10-CM

## 2023-02-14 LAB
25(OH)D3+25(OH)D2 SERPL-MCNC: 41 NG/ML (ref 30–96)
ALBUMIN SERPL BCP-MCNC: 4.3 G/DL (ref 3.5–5.2)
ALP SERPL-CCNC: 56 U/L (ref 55–135)
ALT SERPL W/O P-5'-P-CCNC: 15 U/L (ref 10–44)
ANION GAP SERPL CALC-SCNC: 11 MMOL/L (ref 8–16)
AST SERPL-CCNC: 24 U/L (ref 10–40)
BASOPHILS # BLD AUTO: 0.08 K/UL (ref 0–0.2)
BASOPHILS NFR BLD: 1.5 % (ref 0–1.9)
BILIRUB SERPL-MCNC: 0.8 MG/DL (ref 0.1–1)
BUN SERPL-MCNC: 14 MG/DL (ref 6–20)
CALCIUM SERPL-MCNC: 9.7 MG/DL (ref 8.7–10.5)
CHLORIDE SERPL-SCNC: 103 MMOL/L (ref 95–110)
CO2 SERPL-SCNC: 24 MMOL/L (ref 23–29)
CREAT SERPL-MCNC: 0.9 MG/DL (ref 0.5–1.4)
DIFFERENTIAL METHOD: ABNORMAL
EOSINOPHIL # BLD AUTO: 0.3 K/UL (ref 0–0.5)
EOSINOPHIL NFR BLD: 5.2 % (ref 0–8)
ERYTHROCYTE [DISTWIDTH] IN BLOOD BY AUTOMATED COUNT: 11.9 % (ref 11.5–14.5)
EST. GFR  (NO RACE VARIABLE): >60 ML/MIN/1.73 M^2
FERRITIN SERPL-MCNC: 99 NG/ML (ref 20–300)
GLUCOSE SERPL-MCNC: 97 MG/DL (ref 70–110)
HCT VFR BLD AUTO: 38 % (ref 37–48.5)
HGB BLD-MCNC: 12.7 G/DL (ref 12–16)
IMM GRANULOCYTES # BLD AUTO: 0.01 K/UL (ref 0–0.04)
IMM GRANULOCYTES NFR BLD AUTO: 0.2 % (ref 0–0.5)
IRON SERPL-MCNC: 170 UG/DL (ref 30–160)
LYMPHOCYTES # BLD AUTO: 1.4 K/UL (ref 1–4.8)
LYMPHOCYTES NFR BLD: 26.1 % (ref 18–48)
MCH RBC QN AUTO: 33.9 PG (ref 27–31)
MCHC RBC AUTO-ENTMCNC: 33.4 G/DL (ref 32–36)
MCV RBC AUTO: 101 FL (ref 82–98)
MONOCYTES # BLD AUTO: 0.6 K/UL (ref 0.3–1)
MONOCYTES NFR BLD: 10.6 % (ref 4–15)
NEUTROPHILS # BLD AUTO: 2.9 K/UL (ref 1.8–7.7)
NEUTROPHILS NFR BLD: 56.4 % (ref 38–73)
NRBC BLD-RTO: 0 /100 WBC
PLATELET # BLD AUTO: 166 K/UL (ref 150–450)
PMV BLD AUTO: 10.9 FL (ref 9.2–12.9)
POTASSIUM SERPL-SCNC: 4.4 MMOL/L (ref 3.5–5.1)
PROT SERPL-MCNC: 7.3 G/DL (ref 6–8.4)
RBC # BLD AUTO: 3.75 M/UL (ref 4–5.4)
SATURATED IRON: 52 % (ref 20–50)
SODIUM SERPL-SCNC: 138 MMOL/L (ref 136–145)
TOTAL IRON BINDING CAPACITY: 330 UG/DL (ref 250–450)
TRANSFERRIN SERPL-MCNC: 223 MG/DL (ref 200–375)
TSH SERPL DL<=0.005 MIU/L-ACNC: 1.19 UIU/ML (ref 0.4–4)
VIT B12 SERPL-MCNC: 462 PG/ML (ref 210–950)
WBC # BLD AUTO: 5.21 K/UL (ref 3.9–12.7)

## 2023-02-14 PROCEDURE — 99396 PR PREVENTIVE VISIT,EST,40-64: ICD-10-PCS | Mod: S$GLB,,, | Performed by: INTERNAL MEDICINE

## 2023-02-14 PROCEDURE — 99396 PREV VISIT EST AGE 40-64: CPT | Mod: S$GLB,,, | Performed by: INTERNAL MEDICINE

## 2023-02-14 PROCEDURE — 3075F SYST BP GE 130 - 139MM HG: CPT | Mod: CPTII,S$GLB,, | Performed by: INTERNAL MEDICINE

## 2023-02-14 PROCEDURE — 80053 COMPREHEN METABOLIC PANEL: CPT | Performed by: INTERNAL MEDICINE

## 2023-02-14 PROCEDURE — 3008F BODY MASS INDEX DOCD: CPT | Mod: CPTII,S$GLB,, | Performed by: INTERNAL MEDICINE

## 2023-02-14 PROCEDURE — 1159F MED LIST DOCD IN RCRD: CPT | Mod: CPTII,S$GLB,, | Performed by: INTERNAL MEDICINE

## 2023-02-14 PROCEDURE — 82607 VITAMIN B-12: CPT | Performed by: INTERNAL MEDICINE

## 2023-02-14 PROCEDURE — 3075F PR MOST RECENT SYSTOLIC BLOOD PRESS GE 130-139MM HG: ICD-10-PCS | Mod: CPTII,S$GLB,, | Performed by: INTERNAL MEDICINE

## 2023-02-14 PROCEDURE — 84466 ASSAY OF TRANSFERRIN: CPT | Performed by: INTERNAL MEDICINE

## 2023-02-14 PROCEDURE — 1159F PR MEDICATION LIST DOCUMENTED IN MEDICAL RECORD: ICD-10-PCS | Mod: CPTII,S$GLB,, | Performed by: INTERNAL MEDICINE

## 2023-02-14 PROCEDURE — 36415 COLL VENOUS BLD VENIPUNCTURE: CPT | Performed by: INTERNAL MEDICINE

## 2023-02-14 PROCEDURE — 3079F PR MOST RECENT DIASTOLIC BLOOD PRESSURE 80-89 MM HG: ICD-10-PCS | Mod: CPTII,S$GLB,, | Performed by: INTERNAL MEDICINE

## 2023-02-14 PROCEDURE — 3008F PR BODY MASS INDEX (BMI) DOCUMENTED: ICD-10-PCS | Mod: CPTII,S$GLB,, | Performed by: INTERNAL MEDICINE

## 2023-02-14 PROCEDURE — 82728 ASSAY OF FERRITIN: CPT | Performed by: INTERNAL MEDICINE

## 2023-02-14 PROCEDURE — 99999 PR PBB SHADOW E&M-EST. PATIENT-LVL III: ICD-10-PCS | Mod: PBBFAC,,, | Performed by: INTERNAL MEDICINE

## 2023-02-14 PROCEDURE — 84443 ASSAY THYROID STIM HORMONE: CPT | Performed by: INTERNAL MEDICINE

## 2023-02-14 PROCEDURE — 99999 PR PBB SHADOW E&M-EST. PATIENT-LVL III: CPT | Mod: PBBFAC,,, | Performed by: INTERNAL MEDICINE

## 2023-02-14 PROCEDURE — 85025 COMPLETE CBC W/AUTO DIFF WBC: CPT | Performed by: INTERNAL MEDICINE

## 2023-02-14 PROCEDURE — 3079F DIAST BP 80-89 MM HG: CPT | Mod: CPTII,S$GLB,, | Performed by: INTERNAL MEDICINE

## 2023-02-14 PROCEDURE — 82306 VITAMIN D 25 HYDROXY: CPT | Performed by: INTERNAL MEDICINE

## 2023-02-14 RX ORDER — ACETAZOLAMIDE 250 MG/1
TABLET ORAL
Qty: 16 TABLET | Refills: 0 | Status: SHIPPED | OUTPATIENT
Start: 2023-02-14 | End: 2023-02-18 | Stop reason: SDUPTHER

## 2023-02-14 NOTE — PROGRESS NOTES
CHIEF COMPLAINT: Annual exam      HISTORY OF PRESENT ILLNESS: This is a 42-year-old woman who presents for her annual exam.      She was hospitalized 21 to 3/2/21 for a GI bleed. SHe had melana and coffee ground emesis. She was transfused 1 unit PRBC>  EGD 3/2/21 was normal.  Colonoscopy 3/8/21 - 3 mm polyp in the sigmoid colon.    She continues to take VItron C once daily. Video capsule en doscopy was done 2022 and 2022 which revealed scattered patches of small bowel lymphangiectasia - not the source of bleeding.      She had an upper device - assisted enteroscopy without fluoroscopy on 23  which was normal.     She had an appendectomy 3/2020 due to acute appendicitis.     She had some abdominal pain from eating too much almonds. She cut back on almonds and pain resolved.       She is taking metoprolol succinate 25 mg at bedtime for her congenital prolonged QT syndrome.  She has been exercising regularly. No chest pain, shortness of breath, headaches, palpitations, lightheadedness. Her defibrillator went off when she was vomiting from her appendicitis. Last saw Dr Bryson on 2023.     PAST MEDICAL HISTORY:   1. 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 Episode occurred 6 wks post-partum, triggered with startle + stress. Dual chamber ICD implanted 6/10/11 (Dr. Hyman), placed on Toprol. Has not had events to date.   2. History of heme positive stools of unclear etiology. Had some erosions   in the distal small bowel on capsular endoscopy , had colonoscopies and   endoscopies that were unremarkable.   3. Allergic rhinitis.   4.          PAST SURGICAL HISTORY:   1. Breast reduction.   2. [place] teeth removal.   3. Appendectomy 3/2020     MEDICATIONS and ALLERGIES: Updated on      Social - no tobacco, no alcohol,  2 children.  then remarried 10/2020     Family history - mother and brother  with prolonged QT gene     Review of systems - no fever, chills, nightsweats, weight loss, sinus congestion, sore throat, shortness of breath, chest pain, nausea, vomiting, constipation, diarrhea, dysuria, hematuria, joint pain, muscle pain, headache, rashes, anxiety, depression, insomnia.    Periods are regular  - sees Tamiko Pagan MD     PHYSICAL EXAMINATION:         General: Alert, oriented. No apparent distress. Affect within normal   limits.   Conjunctivae anicteric. PERRL. Tympanic membranes clear. Oropharynx   clear.   Neck supple. No cervical lymphadenopathy, no thyroid enlargement.   Respiratory effort normal. Lungs clear to auscultation.   Heart: Regular rate and rhythm without murmurs, gallops or rubs.   No lower extremity edema.   ABDOMEN: soft, non distended, non tender, bowel sounds present, no hepatosplenomgaly         ASSESSMENT AND PLAN:   Annual exam  Occult Gi bleeding -resolved  Iron deficiency anemia - vitron C one tablet daliy- CBC   Prolonged QT - s/p ICD placement  Flying to [place]- acetazolamide 250 mg twice daily for 2 days prior trip and 2 days at trip.  Try the acetazolamide in the next 1-2 days prior trip.  Push fluids and  make sure potassium is replaced on the acetazolamide.    GYN 4/11/22  Return in one year, sooner if issues

## 2023-02-17 ENCOUNTER — TELEPHONE (OUTPATIENT)
Dept: INTERNAL MEDICINE | Facility: CLINIC | Age: 43
End: 2023-02-17
Payer: COMMERCIAL

## 2023-02-17 NOTE — TELEPHONE ENCOUNTER
----- Message from Mildred Quiñones sent at 2/17/2023  4:25 PM CST -----  [phone]  Pharmacy is calling to clarify an RX.  RX name:  acetaZOLAMIDE (DIAMOX) 250 MG tablet  What do they need to clarify: directions how pcp want pt to take  Comments:      Please call and advise

## 2023-02-19 ENCOUNTER — CLINICAL SUPPORT (OUTPATIENT)
Dept: CARDIOLOGY | Facility: HOSPITAL | Age: 43
End: 2023-02-19
Payer: COMMERCIAL

## 2023-02-19 DIAGNOSIS — Z95.810 PRESENCE OF AUTOMATIC (IMPLANTABLE) CARDIAC DEFIBRILLATOR: ICD-10-CM

## 2023-02-19 PROCEDURE — 93296 REM INTERROG EVL PM/IDS: CPT | Performed by: INTERNAL MEDICINE

## 2023-02-19 PROCEDURE — 93295 CARDIAC DEVICE CHECK - REMOTE: ICD-10-PCS | Mod: ,,, | Performed by: INTERNAL MEDICINE

## 2023-02-19 PROCEDURE — 93295 DEV INTERROG REMOTE 1/2/MLT: CPT | Mod: ,,, | Performed by: INTERNAL MEDICINE

## 2023-03-05 ENCOUNTER — PATIENT MESSAGE (OUTPATIENT)
Dept: INTERNAL MEDICINE | Facility: CLINIC | Age: 43
End: 2023-03-05
Payer: COMMERCIAL

## 2023-05-09 ENCOUNTER — TELEPHONE (OUTPATIENT)
Dept: OBSTETRICS AND GYNECOLOGY | Facility: CLINIC | Age: 43
End: 2023-05-09
Payer: COMMERCIAL

## 2023-05-09 ENCOUNTER — PATIENT MESSAGE (OUTPATIENT)
Dept: INTERNAL MEDICINE | Facility: CLINIC | Age: 43
End: 2023-05-09
Payer: COMMERCIAL

## 2023-05-09 ENCOUNTER — LAB VISIT (OUTPATIENT)
Dept: LAB | Facility: HOSPITAL | Age: 43
End: 2023-05-09
Attending: INTERNAL MEDICINE
Payer: COMMERCIAL

## 2023-05-09 DIAGNOSIS — D50.9 IRON DEFICIENCY ANEMIA, UNSPECIFIED IRON DEFICIENCY ANEMIA TYPE: ICD-10-CM

## 2023-05-09 DIAGNOSIS — Z12.31 ENCOUNTER FOR SCREENING MAMMOGRAM FOR MALIGNANT NEOPLASM OF BREAST: Primary | ICD-10-CM

## 2023-05-09 DIAGNOSIS — K92.1 BLOOD IN STOOL: Primary | ICD-10-CM

## 2023-05-09 LAB
BASOPHILS # BLD AUTO: 0.06 K/UL (ref 0–0.2)
BASOPHILS NFR BLD: 1.2 % (ref 0–1.9)
DIFFERENTIAL METHOD: ABNORMAL
EOSINOPHIL # BLD AUTO: 0.2 K/UL (ref 0–0.5)
EOSINOPHIL NFR BLD: 4.1 % (ref 0–8)
ERYTHROCYTE [DISTWIDTH] IN BLOOD BY AUTOMATED COUNT: 11.2 % (ref 11.5–14.5)
HCT VFR BLD AUTO: 39 % (ref 37–48.5)
HGB BLD-MCNC: 13.1 G/DL (ref 12–16)
IMM GRANULOCYTES # BLD AUTO: 0.01 K/UL (ref 0–0.04)
IMM GRANULOCYTES NFR BLD AUTO: 0.2 % (ref 0–0.5)
LYMPHOCYTES # BLD AUTO: 1.1 K/UL (ref 1–4.8)
LYMPHOCYTES NFR BLD: 22 % (ref 18–48)
MCH RBC QN AUTO: 34.2 PG (ref 27–31)
MCHC RBC AUTO-ENTMCNC: 33.6 G/DL (ref 32–36)
MCV RBC AUTO: 102 FL (ref 82–98)
MONOCYTES # BLD AUTO: 0.5 K/UL (ref 0.3–1)
MONOCYTES NFR BLD: 9.4 % (ref 4–15)
NEUTROPHILS # BLD AUTO: 3.1 K/UL (ref 1.8–7.7)
NEUTROPHILS NFR BLD: 63.1 % (ref 38–73)
NRBC BLD-RTO: 0 /100 WBC
PLATELET # BLD AUTO: 175 K/UL (ref 150–450)
PMV BLD AUTO: 11.4 FL (ref 9.2–12.9)
RBC # BLD AUTO: 3.83 M/UL (ref 4–5.4)
WBC # BLD AUTO: 4.9 K/UL (ref 3.9–12.7)

## 2023-05-09 PROCEDURE — 85025 COMPLETE CBC W/AUTO DIFF WBC: CPT | Performed by: INTERNAL MEDICINE

## 2023-05-09 PROCEDURE — 36415 COLL VENOUS BLD VENIPUNCTURE: CPT | Performed by: INTERNAL MEDICINE

## 2023-05-09 NOTE — TELEPHONE ENCOUNTER
----- Message from Nadeem Hassan MA sent at 5/9/2023  1:16 PM CDT -----  Pt is requesting orders for a mammogram.Please contact PT at [phone].

## 2023-05-09 NOTE — TELEPHONE ENCOUNTER
"Called and spoke to pt. Pt states she feels ok, but started to feel "off". Pt also c/o pain above naval which she says is a usual pain for her. Pt states she did a hemoccult and it was positive. Scheduled pt for a CBC today.  "

## 2023-05-10 ENCOUNTER — PATIENT MESSAGE (OUTPATIENT)
Dept: INTERNAL MEDICINE | Facility: CLINIC | Age: 43
End: 2023-05-10
Payer: COMMERCIAL

## 2023-05-20 ENCOUNTER — CLINICAL SUPPORT (OUTPATIENT)
Dept: CARDIOLOGY | Facility: HOSPITAL | Age: 43
End: 2023-05-20
Payer: COMMERCIAL

## 2023-05-20 DIAGNOSIS — I46.9 CARDIAC ARREST, CAUSE UNSPECIFIED: ICD-10-CM

## 2023-05-20 DIAGNOSIS — Z95.810 PRESENCE OF AUTOMATIC (IMPLANTABLE) CARDIAC DEFIBRILLATOR: ICD-10-CM

## 2023-05-20 DIAGNOSIS — I45.81 LONG QT SYNDROME: ICD-10-CM

## 2023-05-20 PROCEDURE — 93296 REM INTERROG EVL PM/IDS: CPT | Performed by: INTERNAL MEDICINE

## 2023-05-24 ENCOUNTER — OFFICE VISIT (OUTPATIENT)
Dept: OBSTETRICS AND GYNECOLOGY | Facility: CLINIC | Age: 43
End: 2023-05-24
Payer: COMMERCIAL

## 2023-05-24 VITALS
BODY MASS INDEX: 20.65 KG/M2 | SYSTOLIC BLOOD PRESSURE: 148 MMHG | DIASTOLIC BLOOD PRESSURE: 84 MMHG | WEIGHT: 112.88 LBS

## 2023-05-24 DIAGNOSIS — Z01.419 WELL WOMAN EXAM WITH ROUTINE GYNECOLOGICAL EXAM: Primary | ICD-10-CM

## 2023-05-24 PROCEDURE — 3008F PR BODY MASS INDEX (BMI) DOCUMENTED: ICD-10-PCS | Mod: CPTII,S$GLB,, | Performed by: OBSTETRICS & GYNECOLOGY

## 2023-05-24 PROCEDURE — 3008F BODY MASS INDEX DOCD: CPT | Mod: CPTII,S$GLB,, | Performed by: OBSTETRICS & GYNECOLOGY

## 2023-05-24 PROCEDURE — 1160F RVW MEDS BY RX/DR IN RCRD: CPT | Mod: CPTII,S$GLB,, | Performed by: OBSTETRICS & GYNECOLOGY

## 2023-05-24 PROCEDURE — 99999 PR PBB SHADOW E&M-EST. PATIENT-LVL II: CPT | Mod: PBBFAC,,, | Performed by: OBSTETRICS & GYNECOLOGY

## 2023-05-24 PROCEDURE — 99999 PR PBB SHADOW E&M-EST. PATIENT-LVL II: ICD-10-PCS | Mod: PBBFAC,,, | Performed by: OBSTETRICS & GYNECOLOGY

## 2023-05-24 PROCEDURE — 1159F PR MEDICATION LIST DOCUMENTED IN MEDICAL RECORD: ICD-10-PCS | Mod: CPTII,S$GLB,, | Performed by: OBSTETRICS & GYNECOLOGY

## 2023-05-24 PROCEDURE — 3079F DIAST BP 80-89 MM HG: CPT | Mod: CPTII,S$GLB,, | Performed by: OBSTETRICS & GYNECOLOGY

## 2023-05-24 PROCEDURE — 99396 PREV VISIT EST AGE 40-64: CPT | Mod: S$GLB,,, | Performed by: OBSTETRICS & GYNECOLOGY

## 2023-05-24 PROCEDURE — 3079F PR MOST RECENT DIASTOLIC BLOOD PRESSURE 80-89 MM HG: ICD-10-PCS | Mod: CPTII,S$GLB,, | Performed by: OBSTETRICS & GYNECOLOGY

## 2023-05-24 PROCEDURE — 3077F SYST BP >= 140 MM HG: CPT | Mod: CPTII,S$GLB,, | Performed by: OBSTETRICS & GYNECOLOGY

## 2023-05-24 PROCEDURE — 3077F PR MOST RECENT SYSTOLIC BLOOD PRESSURE >= 140 MM HG: ICD-10-PCS | Mod: CPTII,S$GLB,, | Performed by: OBSTETRICS & GYNECOLOGY

## 2023-05-24 PROCEDURE — 1160F PR REVIEW ALL MEDS BY PRESCRIBER/CLIN PHARMACIST DOCUMENTED: ICD-10-PCS | Mod: CPTII,S$GLB,, | Performed by: OBSTETRICS & GYNECOLOGY

## 2023-05-24 PROCEDURE — 1159F MED LIST DOCD IN RCRD: CPT | Mod: CPTII,S$GLB,, | Performed by: OBSTETRICS & GYNECOLOGY

## 2023-05-24 PROCEDURE — 99396 PR PREVENTIVE VISIT,EST,40-64: ICD-10-PCS | Mod: S$GLB,,, | Performed by: OBSTETRICS & GYNECOLOGY

## 2023-05-24 NOTE — PROGRESS NOTES
Subjective:       Patient ID: Arianna Paredes is a 42 y.o. female.    Chief Complaint:  Well Woman    History of Present Illness:  HPI  42 y.o. female  here for annual.     She describes her periods as regular, lasting 4-5 days, heavy flow with passage of clots. Cramping with menses.  denies vaginal itching or irritation.  denies vaginal discharge.    She is sexually active with 1 partner.  She uses partner vasectomy for contraception.    History of abnormal pap: Yes - - LSIL  Last Pap:  - NILM/HPV neg  Last MM2022 - Birads 1  Last Colonoscopy:  benign    Family History   Problem Relation Age of Onset    Breast cancer Maternal Grandmother     Breast cancer Maternal Aunt         great great aunt    Long QT syndrome Mother     Long QT syndrome Brother     Psoriasis Neg Hx     Lupus Neg Hx     Eczema Neg Hx     Acne Neg Hx     Melanoma Neg Hx     Ovarian cancer Neg Hx     Colon cancer Neg Hx          GYN & OB History  Patient's last menstrual period was 2023.   Date of Last Pap: 2022    OB History    Para Term  AB Living   2 2 2     2   SAB IAB Ectopic Multiple Live Births           2      # Outcome Date GA Lbr Fransico/2nd Weight Sex Delivery Anes PTL Lv   2 Term 13 38w5d 08:46 / 00:13 3.375 kg (7 lb 7.1 oz) M Vag-Spont EPI N ENA   1 Term 11 40w0d  3.515 kg (7 lb 12 oz) M Vag-Spont EPI  ENA       Review of Systems  Review of Systems   Constitutional:  Negative for chills, diaphoresis, fatigue and fever.   Respiratory:  Negative for cough and shortness of breath.    Cardiovascular:  Negative for chest pain and palpitations.   Gastrointestinal:  Negative for abdominal pain, constipation, diarrhea, nausea and vomiting.   Genitourinary:  Negative for dyspareunia, pelvic pain, vaginal bleeding, vaginal discharge and vaginal pain.   Integumentary:  Negative for breast mass, nipple discharge and breast skin changes.   Neurological:  Negative for headaches.    Psychiatric/Behavioral:  Negative for depression. The patient is not nervous/anxious.    Breast: Negative for mass, mastodynia, nipple discharge and skin changes        Objective:    Physical Exam:   Constitutional: She is oriented to person, place, and time. She appears well-developed and well-nourished. No distress.    HENT:   Head: Normocephalic and atraumatic.       Pulmonary/Chest: Effort normal. She exhibits no mass and no tenderness. Right breast exhibits no inverted nipple, no mass, no nipple discharge, no skin change, no tenderness and no swelling. Left breast exhibits no inverted nipple, no mass, no nipple discharge, no skin change, no tenderness and no swelling. Breasts are symmetrical.            Abdominal: Soft. She exhibits no distension. There is no abdominal tenderness.     Genitourinary:    Genitourinary Comments: Normal external female genitalia; vagina rugated, normal, urethra palpated; cervix normal, no masses; uterus small mobile nontender; no adnexal masses palpated.             Musculoskeletal: Normal range of motion and moves all extremeties.       Neurological: She is alert and oriented to person, place, and time.    Skin: Skin is warm.    Psychiatric: She has a normal mood and affect. Her behavior is normal. Judgment and thought content normal.        Assessment:        1. Well woman exam with routine gynecological exam                 Plan:      Arianna was seen today for annual exam.    Diagnoses and all orders for this visit:    Well woman exam with routine gynecological exam  - Pap guidelines discussed. Pap up to date.   - Breast cancer screening - MMG ordered.   - Colon cancer screening - up to date.   - Contraception - partner vasectomy.       No orders of the defined types were placed in this encounter.      Follow up in about 1 year (around 5/24/2024) for annual.

## 2023-08-03 ENCOUNTER — PATIENT MESSAGE (OUTPATIENT)
Dept: INTERNAL MEDICINE | Facility: CLINIC | Age: 43
End: 2023-08-03
Payer: COMMERCIAL

## 2023-08-03 RX ORDER — ERYTHROMYCIN 5 MG/G
OINTMENT OPHTHALMIC 3 TIMES DAILY
Qty: 3.5 G | Refills: 0 | Status: SHIPPED | OUTPATIENT
Start: 2023-08-03 | End: 2024-03-26

## 2023-08-21 ENCOUNTER — HOSPITAL ENCOUNTER (OUTPATIENT)
Dept: RADIOLOGY | Facility: OTHER | Age: 43
Discharge: HOME | End: 2023-08-21
Attending: OBSTETRICS & GYNECOLOGY
Payer: COMMERCIAL

## 2023-08-21 DIAGNOSIS — Z12.31 ENCOUNTER FOR SCREENING MAMMOGRAM FOR MALIGNANT NEOPLASM OF BREAST: ICD-10-CM

## 2023-08-21 PROCEDURE — 77063 MAMMO DIGITAL SCREENING BILAT WITH TOMO: ICD-10-PCS | Mod: 26,,, | Performed by: RADIOLOGY

## 2023-08-21 PROCEDURE — 77063 BREAST TOMOSYNTHESIS BI: CPT | Mod: 26,,, | Performed by: RADIOLOGY

## 2023-08-21 PROCEDURE — 77067 SCR MAMMO BI INCL CAD: CPT | Mod: 26,,, | Performed by: RADIOLOGY

## 2023-08-21 PROCEDURE — 77067 MAMMO DIGITAL SCREENING BILAT WITH TOMO: ICD-10-PCS | Mod: 26,,, | Performed by: RADIOLOGY

## 2023-08-21 PROCEDURE — 77067 SCR MAMMO BI INCL CAD: CPT | Mod: TC

## 2023-09-01 DIAGNOSIS — I45.81 LONG Q-T SYNDROME: Primary | ICD-10-CM

## 2023-09-01 RX ORDER — POTASSIUM CHLORIDE 20 MEQ/1
TABLET, EXTENDED RELEASE ORAL
Qty: 90 TABLET | Refills: 0 | Status: SHIPPED | OUTPATIENT
Start: 2023-09-01 | End: 2024-01-29 | Stop reason: SDUPTHER

## 2023-09-28 ENCOUNTER — CLINICAL SUPPORT (OUTPATIENT)
Dept: CARDIOLOGY | Facility: HOSPITAL | Age: 43
End: 2023-09-28
Payer: COMMERCIAL

## 2023-09-28 DIAGNOSIS — Z95.810 PRESENCE OF AUTOMATIC (IMPLANTABLE) CARDIAC DEFIBRILLATOR: ICD-10-CM

## 2023-09-28 PROCEDURE — 93295 CARDIAC DEVICE CHECK - REMOTE: ICD-10-PCS | Mod: ,,, | Performed by: INTERNAL MEDICINE

## 2023-09-28 PROCEDURE — 93295 DEV INTERROG REMOTE 1/2/MLT: CPT | Mod: ,,, | Performed by: INTERNAL MEDICINE

## 2023-09-28 PROCEDURE — 93296 REM INTERROG EVL PM/IDS: CPT | Performed by: INTERNAL MEDICINE

## 2023-11-14 RX ORDER — METOPROLOL SUCCINATE 25 MG/1
TABLET, EXTENDED RELEASE ORAL
Qty: 90 TABLET | Refills: 0 | Status: SHIPPED | OUTPATIENT
Start: 2023-11-14 | End: 2023-12-27

## 2023-12-27 RX ORDER — METOPROLOL SUCCINATE 25 MG/1
TABLET, EXTENDED RELEASE ORAL
Qty: 30 TABLET | Refills: 0 | Status: SHIPPED | OUTPATIENT
Start: 2023-12-27 | End: 2024-01-23 | Stop reason: SDUPTHER

## 2024-01-23 DIAGNOSIS — I45.81 LONG Q-T SYNDROME: Primary | ICD-10-CM

## 2024-01-24 RX ORDER — METOPROLOL SUCCINATE 25 MG/1
25 TABLET, EXTENDED RELEASE ORAL DAILY
Qty: 90 TABLET | Refills: 0 | Status: SHIPPED | OUTPATIENT
Start: 2024-01-24 | End: 2024-04-26

## 2024-01-25 ENCOUNTER — TELEPHONE (OUTPATIENT)
Dept: OBSTETRICS AND GYNECOLOGY | Facility: CLINIC | Age: 44
End: 2024-01-25
Payer: COMMERCIAL

## 2024-01-29 DIAGNOSIS — I45.81 LONG Q-T SYNDROME: ICD-10-CM

## 2024-01-30 RX ORDER — POTASSIUM CHLORIDE 20 MEQ/1
TABLET, EXTENDED RELEASE ORAL
Qty: 90 TABLET | Refills: 3 | Status: SHIPPED | OUTPATIENT
Start: 2024-01-30

## 2024-03-26 ENCOUNTER — OFFICE VISIT (OUTPATIENT)
Dept: INTERNAL MEDICINE | Facility: CLINIC | Age: 44
End: 2024-03-26
Payer: COMMERCIAL

## 2024-03-26 VITALS
SYSTOLIC BLOOD PRESSURE: 120 MMHG | BODY MASS INDEX: 21.17 KG/M2 | OXYGEN SATURATION: 100 % | HEART RATE: 62 BPM | WEIGHT: 115.06 LBS | DIASTOLIC BLOOD PRESSURE: 72 MMHG | HEIGHT: 62 IN

## 2024-03-26 DIAGNOSIS — Z00.00 ENCOUNTER FOR PREVENTIVE HEALTH EXAMINATION: Primary | ICD-10-CM

## 2024-03-26 PROCEDURE — 99396 PREV VISIT EST AGE 40-64: CPT | Mod: S$GLB,,, | Performed by: PHYSICIAN ASSISTANT

## 2024-03-26 PROCEDURE — 3078F DIAST BP <80 MM HG: CPT | Mod: CPTII,S$GLB,, | Performed by: PHYSICIAN ASSISTANT

## 2024-03-26 PROCEDURE — 99999 PR PBB SHADOW E&M-EST. PATIENT-LVL IV: CPT | Mod: PBBFAC,,, | Performed by: PHYSICIAN ASSISTANT

## 2024-03-26 PROCEDURE — 1159F MED LIST DOCD IN RCRD: CPT | Mod: CPTII,S$GLB,, | Performed by: PHYSICIAN ASSISTANT

## 2024-03-26 PROCEDURE — 1160F RVW MEDS BY RX/DR IN RCRD: CPT | Mod: CPTII,S$GLB,, | Performed by: PHYSICIAN ASSISTANT

## 2024-03-26 PROCEDURE — 3008F BODY MASS INDEX DOCD: CPT | Mod: CPTII,S$GLB,, | Performed by: PHYSICIAN ASSISTANT

## 2024-03-26 PROCEDURE — 3074F SYST BP LT 130 MM HG: CPT | Mod: CPTII,S$GLB,, | Performed by: PHYSICIAN ASSISTANT

## 2024-03-26 RX ORDER — LANOLIN ALCOHOL/MO/W.PET/CERES
100 CREAM (GRAM) TOPICAL DAILY
COMMUNITY

## 2024-03-26 NOTE — PROGRESS NOTES
Subjective:       Patient ID: Arianna Paredes is a 43 y.o. female.        Chief Complaint: Annual Exam    Arianna Paredes is an established patient of Zunilda Beckett MD here today for annual exam.    , 2 children.    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 Negative for chills, diaphoresis and fever.   HENT:  Negative for congestion and sore throat.    Eyes:  Negative for visual disturbance.   Respiratory:  Negative for cough, chest tightness and shortness of breath.    Cardiovascular:  Negative for chest pain, palpitations and leg swelling.   Gastrointestinal:  Negative for abdominal pain, blood in stool, constipation, diarrhea, nausea and vomiting.   Genitourinary:  Positive for menstrual problem. Negative for dysuria, frequency, hematuria and urgency.   Musculoskeletal:  Negative for arthralgias and back pain.   Skin:  Negative for rash.   Neurological:  Negative for dizziness, syncope, weakness and headaches.   Psychiatric/Behavioral:  Negative for dysphoric mood and sleep disturbance. The patient is not nervous/anxious.        Objective:      Physical Exam  Vitals and nursing note reviewed.   Constitutional:       Appearance: Normal appearance. She is well-developed.   HENT:      Head: Normocephalic.      Right Ear: Tympanic membrane and external ear normal.      Left Ear: Tympanic membrane and external ear normal.      Nose: No mucosal edema or rhinorrhea.      Mouth/Throat:      Pharynx: Oropharynx is  "clear.   Eyes:      Pupils: Pupils are equal, round, and reactive to light.   Cardiovascular:      Rate and Rhythm: Normal rate and regular rhythm.      Heart sounds: Normal heart sounds. No murmur heard.     No friction rub. No gallop.   Pulmonary:      Effort: Pulmonary effort is normal. No respiratory distress.      Breath sounds: Normal breath sounds.   Abdominal:      Palpations: Abdomen is soft.      Tenderness: There is no abdominal tenderness.   Skin:     General: Skin is warm and dry.   Neurological:      Mental Status: She is alert.         Assessment:       1. Encounter for preventive health examination        Plan:       Arianna was seen today for annual exam.    Diagnoses and all orders for this visit:    Encounter for preventive health examination  -     CBC Auto Differential; Future  -     Comprehensive Metabolic Panel; Future  -     Hemoglobin A1C; Future  -     Lipid Panel; Future  -     TSH; Future  -     Vitamin B12; Future  -     Vitamin D; Future  -     Iron and TIBC; Future  -     Ferritin; Future    Advised to schedule an appointment with GYN for heavy menstrual cycles  Check all lab work as above    Pt has been given instructions populated from patient instructions database and has verbalized understanding of the after visit summary and information contained wherein.    Follow up with a primary care provider. May go to ER for acute shortness of breath, lightheadedness, fever, or any other emergent complaints or changes in condition.    "This note will be shared with the patient"    Future Appointments   Date Time Provider Department Center   4/3/2024  9:00 AM COORDINATED DEVICE CHECK [facility] ARRHPRO Stpehen Mendez   4/3/2024  9:45 AM EKG, APPT [facility] EKG Stephen Mendez   4/3/2024 10:20 AM Flex Bryson MD [facility] ARRHYTH Stephen Mendez   4/22/2024  9:20 AM Richelle Baumann MD [facility] DERM Stephen Mendez                 "

## 2024-03-27 ENCOUNTER — PATIENT MESSAGE (OUTPATIENT)
Dept: CARDIOLOGY | Facility: HOSPITAL | Age: 44
End: 2024-03-27
Payer: COMMERCIAL

## 2024-03-28 ENCOUNTER — CLINICAL SUPPORT (OUTPATIENT)
Dept: CARDIOLOGY | Facility: HOSPITAL | Age: 44
End: 2024-03-28
Attending: INTERNAL MEDICINE
Payer: COMMERCIAL

## 2024-03-28 ENCOUNTER — CLINICAL SUPPORT (OUTPATIENT)
Dept: CARDIOLOGY | Facility: HOSPITAL | Age: 44
End: 2024-03-28
Payer: COMMERCIAL

## 2024-03-28 DIAGNOSIS — Z95.810 PRESENCE OF AUTOMATIC (IMPLANTABLE) CARDIAC DEFIBRILLATOR: ICD-10-CM

## 2024-03-28 PROCEDURE — 93296 REM INTERROG EVL PM/IDS: CPT | Performed by: INTERNAL MEDICINE

## 2024-03-28 PROCEDURE — 93295 DEV INTERROG REMOTE 1/2/MLT: CPT | Mod: ,,, | Performed by: INTERNAL MEDICINE

## 2024-04-02 ENCOUNTER — LAB VISIT (OUTPATIENT)
Dept: LAB | Facility: HOSPITAL | Age: 44
End: 2024-04-02
Payer: COMMERCIAL

## 2024-04-02 ENCOUNTER — PATIENT MESSAGE (OUTPATIENT)
Dept: INTERNAL MEDICINE | Facility: CLINIC | Age: 44
End: 2024-04-02
Payer: COMMERCIAL

## 2024-04-02 DIAGNOSIS — Z00.00 ENCOUNTER FOR PREVENTIVE HEALTH EXAMINATION: ICD-10-CM

## 2024-04-02 LAB
25(OH)D3+25(OH)D2 SERPL-MCNC: 37 NG/ML (ref 30–96)
ALBUMIN SERPL BCP-MCNC: 4.5 G/DL (ref 3.5–5.2)
ALP SERPL-CCNC: 52 U/L (ref 55–135)
ALT SERPL W/O P-5'-P-CCNC: 12 U/L (ref 10–44)
ANION GAP SERPL CALC-SCNC: 7 MMOL/L (ref 8–16)
AST SERPL-CCNC: 18 U/L (ref 10–40)
BASOPHILS # BLD AUTO: 0.05 K/UL (ref 0–0.2)
BASOPHILS NFR BLD: 1.2 % (ref 0–1.9)
BILIRUB SERPL-MCNC: 0.8 MG/DL (ref 0.1–1)
BUN SERPL-MCNC: 10 MG/DL (ref 6–20)
CALCIUM SERPL-MCNC: 9.6 MG/DL (ref 8.7–10.5)
CHLORIDE SERPL-SCNC: 106 MMOL/L (ref 95–110)
CHOLEST SERPL-MCNC: 266 MG/DL (ref 120–199)
CHOLEST/HDLC SERPL: 3 {RATIO} (ref 2–5)
CO2 SERPL-SCNC: 26 MMOL/L (ref 23–29)
CREAT SERPL-MCNC: 0.8 MG/DL (ref 0.5–1.4)
DIFFERENTIAL METHOD BLD: ABNORMAL
EOSINOPHIL # BLD AUTO: 0.3 K/UL (ref 0–0.5)
EOSINOPHIL NFR BLD: 6.3 % (ref 0–8)
ERYTHROCYTE [DISTWIDTH] IN BLOOD BY AUTOMATED COUNT: 11.6 % (ref 11.5–14.5)
EST. GFR  (NO RACE VARIABLE): >60 ML/MIN/1.73 M^2
ESTIMATED AVG GLUCOSE: 97 MG/DL (ref 68–131)
FERRITIN SERPL-MCNC: 224 NG/ML (ref 20–300)
GLUCOSE SERPL-MCNC: 82 MG/DL (ref 70–110)
HBA1C MFR BLD: 5 % (ref 4–5.6)
HCT VFR BLD AUTO: 41.7 % (ref 37–48.5)
HDLC SERPL-MCNC: 88 MG/DL (ref 40–75)
HDLC SERPL: 33.1 % (ref 20–50)
HGB BLD-MCNC: 14.1 G/DL (ref 12–16)
IMM GRANULOCYTES # BLD AUTO: 0.01 K/UL (ref 0–0.04)
IMM GRANULOCYTES NFR BLD AUTO: 0.2 % (ref 0–0.5)
IRON SERPL-MCNC: 125 UG/DL (ref 30–160)
LDLC SERPL CALC-MCNC: 162.4 MG/DL (ref 63–159)
LYMPHOCYTES # BLD AUTO: 1.1 K/UL (ref 1–4.8)
LYMPHOCYTES NFR BLD: 26.4 % (ref 18–48)
MCH RBC QN AUTO: 34.8 PG (ref 27–31)
MCHC RBC AUTO-ENTMCNC: 33.8 G/DL (ref 32–36)
MCV RBC AUTO: 103 FL (ref 82–98)
MONOCYTES # BLD AUTO: 0.4 K/UL (ref 0.3–1)
MONOCYTES NFR BLD: 10 % (ref 4–15)
NEUTROPHILS # BLD AUTO: 2.4 K/UL (ref 1.8–7.7)
NEUTROPHILS NFR BLD: 55.9 % (ref 38–73)
NONHDLC SERPL-MCNC: 178 MG/DL
NRBC BLD-RTO: 0 /100 WBC
PLATELET # BLD AUTO: 140 K/UL (ref 150–450)
PMV BLD AUTO: 11.4 FL (ref 9.2–12.9)
POTASSIUM SERPL-SCNC: 4.8 MMOL/L (ref 3.5–5.1)
PROT SERPL-MCNC: 7.6 G/DL (ref 6–8.4)
RBC # BLD AUTO: 4.05 M/UL (ref 4–5.4)
SATURATED IRON: 34 % (ref 20–50)
SODIUM SERPL-SCNC: 139 MMOL/L (ref 136–145)
TOTAL IRON BINDING CAPACITY: 373 UG/DL (ref 250–450)
TRANSFERRIN SERPL-MCNC: 252 MG/DL (ref 200–375)
TRIGL SERPL-MCNC: 78 MG/DL (ref 30–150)
TSH SERPL DL<=0.005 MIU/L-ACNC: 0.89 UIU/ML (ref 0.4–4)
VIT B12 SERPL-MCNC: >2000 PG/ML (ref 210–950)
WBC # BLD AUTO: 4.32 K/UL (ref 3.9–12.7)

## 2024-04-02 PROCEDURE — 83540 ASSAY OF IRON: CPT | Performed by: PHYSICIAN ASSISTANT

## 2024-04-02 PROCEDURE — 36415 COLL VENOUS BLD VENIPUNCTURE: CPT | Performed by: PHYSICIAN ASSISTANT

## 2024-04-02 PROCEDURE — 82306 VITAMIN D 25 HYDROXY: CPT | Performed by: PHYSICIAN ASSISTANT

## 2024-04-02 PROCEDURE — 83036 HEMOGLOBIN GLYCOSYLATED A1C: CPT | Performed by: PHYSICIAN ASSISTANT

## 2024-04-02 PROCEDURE — 82607 VITAMIN B-12: CPT | Performed by: PHYSICIAN ASSISTANT

## 2024-04-02 PROCEDURE — 82728 ASSAY OF FERRITIN: CPT | Performed by: PHYSICIAN ASSISTANT

## 2024-04-02 PROCEDURE — 85025 COMPLETE CBC W/AUTO DIFF WBC: CPT | Performed by: PHYSICIAN ASSISTANT

## 2024-04-02 PROCEDURE — 84443 ASSAY THYROID STIM HORMONE: CPT | Performed by: PHYSICIAN ASSISTANT

## 2024-04-02 PROCEDURE — 80061 LIPID PANEL: CPT | Performed by: PHYSICIAN ASSISTANT

## 2024-04-02 PROCEDURE — 80053 COMPREHEN METABOLIC PANEL: CPT | Performed by: PHYSICIAN ASSISTANT

## 2024-04-03 LAB
OHS CV DC REMOTE DEVICE TYPE: NORMAL
OHS CV RV PACING PERCENT: 0.02 %

## 2024-04-26 RX ORDER — METOPROLOL SUCCINATE 25 MG/1
25 TABLET, EXTENDED RELEASE ORAL
Qty: 30 TABLET | Refills: 0 | Status: SHIPPED | OUTPATIENT
Start: 2024-04-26

## 2024-04-29 NOTE — PROGRESS NOTES
Ms. Paredes is a patient of Dr. Bryson and was last seen in clinic 1/18/2023.      Subjective:   Patient ID:  Arianna Paredes is a 43 y.o. female who presents for follow up of ICD  .     HPI:    Ms. Paredes is a 43 y.o. 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    Background:     SCD episode occurred 6 wks post-partum, triggered with startle + stress. Dual chamber ICD implanted 6/10/11 (Dr. Hyman), placed on Toprol. Has not had events to date.   Device interrogation reveals stable function of leads, RA pacing 47%, no RV pacing.  At her last clinic appt 6/2017 she was feeling great. Was considering breast lift, has questions regarding timing of this compared to generator change.  Also noted device is dropping a bit.  Dr. Bryson advised to proceed with breast lift before gen change. Rate was to be decreased to 50bpm to preserve battery. Reduced to 55 in clinic, as of device check 2/2018 it is still 55.     Had recurrent VF in the setting of electrolyte abnormalities and appendicitis.     9/20/2018 generator change and pocket revision.      1/18/2023: Doing well overall. Exercising without difficulty.  Device interrogation reveals stable function of the leads, RA pacing < 1%, RV pacing < 1%, no significant arrhythmias.  ECG reveal nsr with QTc 460 msec.  Doing great. Add Magnesium.  F/u with monitoring as scheduled, with me in one year.    Update (05/01/2024):    Today she says she is feeling well. No cardiac complaints. Denies CORTEZ, CP, palps, LH, syncope. Exercises regularly. Eats a well balanced diet. On toprol 25mg daily.    Device Interrogation (5/1/2024) reveals an intrinsic SR with stable lead and device function. No arrhythmias or treated episodes were noted.  She paces 0% in the RA and 0% in the RV. Estimated battery longevity 3.8 years.     I have personally reviewed the patient's EKG  today, which shows SB at 57bpm. RI interval is 120. QRS is 78. QT is 466.    Relevant Cardiac Test Results:    2D Echo (3/18/2020):  Normal left ventricular systolic function. The estimated ejection fraction is 60%.  Normal right ventricular systolic function.  Normal LV diastolic function.  Mild tricuspid regurgitation.  Mild mitral regurgitation.  Intermediate central venous pressure (8 mmHg).  The estimated PA systolic pressure is 30 mmHg.    Current Outpatient Medications   Medication Sig Dispense Refill    cyanocobalamin (VITAMIN B-12) 1000 MCG tablet Take 100 mcg by mouth once daily.      iron,carbonyl/ascorbic acid (VITRON-C ORAL) Take 1 tablet by mouth once daily.      metoprolol succinate (TOPROL-XL) 25 MG 24 hr tablet TAKE 1 TABLET(25 MG) BY MOUTH EVERY DAY 30 tablet 0    potassium chloride SA (K-DUR,KLOR-CON) 20 MEQ tablet TAKE 1 TABLET BY MOUTH EVERY DAY 90 tablet 3    vitamin D (VITAMIN D3) 1000 units Tab Take 1,000 Units by mouth once daily.      acetaZOLAMIDE (DIAMOX) 250 MG tablet TAKE 1 TABLET BY MOUTH TWICE DAILY FOR 4 DAYS PRIOR TO ASCENT; 2 DAYS AT ASCENT OR UNTIL SYMPTOMS RESOLVE FROM THE ALTITUDE (Patient not taking: Reported on 3/26/2024) 16 tablet 0    mv,Ca,min/iron/FA/guarana/caff (ONE-A-DAY WOMEN'S ACTIVE ORAL) Take by mouth. (Patient not taking: Reported on 5/1/2024)      omega-3 fatty acids/fish oil (FISH OIL-OMEGA-3 FATTY ACIDS) 300-1,000 mg capsule Take by mouth once daily. (Patient not taking: Reported on 5/1/2024)      tobramycin sulfate 0.3% (TOBREX) 0.3 % ophthalmic solution Apply one drop in affected eye TID for 7 days (Patient not taking: Reported on 5/1/2024) 5 mL 0     No current facility-administered medications for this visit.       Review of Systems   Constitutional: Negative for malaise/fatigue.   Cardiovascular:  Negative for chest pain, dyspnea on exertion, irregular heartbeat, leg swelling and palpitations.   Respiratory:  Negative for shortness of breath.     Hematologic/Lymphatic: Negative for bleeding problem.   Skin:  Negative for rash.   Musculoskeletal:  Negative for myalgias.   Gastrointestinal:  Negative for hematemesis, hematochezia and nausea.   Genitourinary:  Negative for hematuria.   Neurological:  Negative for light-headedness.   Psychiatric/Behavioral:  Negative for altered mental status.    Allergic/Immunologic: Negative for persistent infections.       Objective:          /74   Pulse (!) 57   Wt 51.8 kg (114 lb 3.2 oz)   BMI 20.89 kg/m²     Physical Exam  Vitals and nursing note reviewed.   Constitutional:       Appearance: Normal appearance. She is well-developed.   HENT:      Head: Normocephalic.      Nose: Nose normal.   Eyes:      Pupils: Pupils are equal, round, and reactive to light.   Cardiovascular:      Rate and Rhythm: Normal rate and regular rhythm.   Pulmonary:      Effort: No respiratory distress.      Breath sounds: Normal breath sounds.   Chest:      Comments: Device to LUCW. Incision and pocket in good repair.    Musculoskeletal:         General: Normal range of motion.   Skin:     General: Skin is warm and dry.      Findings: No erythema.   Neurological:      Mental Status: She is alert and oriented to person, place, and time.   Psychiatric:         Speech: Speech normal.         Behavior: Behavior normal.           Lab Results   Component Value Date     04/02/2024    K 4.8 04/02/2024    MG 1.7 03/02/2021    BUN 10 04/02/2024    CREATININE 0.8 04/02/2024    ALT 12 04/02/2024    AST 18 04/02/2024    HGB 14.1 04/02/2024    HCT 41.7 04/02/2024    TSH 0.894 04/02/2024    LDLCALC 162.4 (H) 04/02/2024             Assessment:     1. Automatic implantable cardioverter-defibrillator in situ    2. Long Q-T syndrome    3. Near syncope      Plan:     In summary, Ms. Paredes is a 43 y.o. 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  Ms. Paredes is doing well from a device perspective with stable lead and device function. No arrhythmia noted. No RV pacing. ECG with stable . No CHF symptoms. She is active and feels well.     Continue current medication regimen and device settings.   Follow up in device clinic as scheduled.   Follow up in EP clinic in 1 year, sooner as needed.     *A copy of this note has been sent to Dr. Bryson*    Follow up in about 1 year (around 5/1/2025).    ------------------------------------------------------------------    RO Bonds, NP-C  Cardiac Electrophysiology

## 2024-05-01 ENCOUNTER — CLINICAL SUPPORT (OUTPATIENT)
Dept: CARDIOLOGY | Facility: HOSPITAL | Age: 44
End: 2024-05-01
Attending: INTERNAL MEDICINE
Payer: COMMERCIAL

## 2024-05-01 ENCOUNTER — OFFICE VISIT (OUTPATIENT)
Dept: ELECTROPHYSIOLOGY | Facility: CLINIC | Age: 44
End: 2024-05-01
Payer: COMMERCIAL

## 2024-05-01 ENCOUNTER — HOSPITAL ENCOUNTER (OUTPATIENT)
Dept: CARDIOLOGY | Facility: CLINIC | Age: 44
Discharge: HOME | End: 2024-05-01
Attending: INTERNAL MEDICINE
Payer: COMMERCIAL

## 2024-05-01 VITALS
WEIGHT: 114.19 LBS | SYSTOLIC BLOOD PRESSURE: 118 MMHG | BODY MASS INDEX: 20.89 KG/M2 | HEART RATE: 57 BPM | DIASTOLIC BLOOD PRESSURE: 74 MMHG

## 2024-05-01 DIAGNOSIS — I45.81 LONG Q-T SYNDROME: ICD-10-CM

## 2024-05-01 DIAGNOSIS — Z95.810 AUTOMATIC IMPLANTABLE CARDIOVERTER-DEFIBRILLATOR IN SITU: Primary | ICD-10-CM

## 2024-05-01 DIAGNOSIS — R55 NEAR SYNCOPE: ICD-10-CM

## 2024-05-01 LAB
OHS QRS DURATION: 78 MS
OHS QTC CALCULATION: 453 MS

## 2024-05-01 PROCEDURE — 3078F DIAST BP <80 MM HG: CPT | Mod: CPTII,S$GLB,, | Performed by: NURSE PRACTITIONER

## 2024-05-01 PROCEDURE — 1159F MED LIST DOCD IN RCRD: CPT | Mod: CPTII,S$GLB,, | Performed by: NURSE PRACTITIONER

## 2024-05-01 PROCEDURE — 99214 OFFICE O/P EST MOD 30 MIN: CPT | Mod: S$GLB,,, | Performed by: NURSE PRACTITIONER

## 2024-05-01 PROCEDURE — 93010 ELECTROCARDIOGRAM REPORT: CPT | Mod: S$GLB,,, | Performed by: INTERNAL MEDICINE

## 2024-05-01 PROCEDURE — 93005 ELECTROCARDIOGRAM TRACING: CPT | Mod: S$GLB,,, | Performed by: INTERNAL MEDICINE

## 2024-05-01 PROCEDURE — 3044F HG A1C LEVEL LT 7.0%: CPT | Mod: CPTII,S$GLB,, | Performed by: NURSE PRACTITIONER

## 2024-05-01 PROCEDURE — 93283 PRGRMG EVAL IMPLANTABLE DFB: CPT

## 2024-05-01 PROCEDURE — 93283 PRGRMG EVAL IMPLANTABLE DFB: CPT | Mod: 26,,, | Performed by: INTERNAL MEDICINE

## 2024-05-01 PROCEDURE — 99999 PR PBB SHADOW E&M-EST. PATIENT-LVL III: CPT | Mod: PBBFAC,,, | Performed by: NURSE PRACTITIONER

## 2024-05-01 PROCEDURE — 3074F SYST BP LT 130 MM HG: CPT | Mod: CPTII,S$GLB,, | Performed by: NURSE PRACTITIONER

## 2024-05-01 PROCEDURE — 1160F RVW MEDS BY RX/DR IN RCRD: CPT | Mod: CPTII,S$GLB,, | Performed by: NURSE PRACTITIONER

## 2024-05-01 PROCEDURE — 3008F BODY MASS INDEX DOCD: CPT | Mod: CPTII,S$GLB,, | Performed by: NURSE PRACTITIONER

## 2024-05-02 LAB
OHS CV AF BURDEN PERCENT: < 1
OHS CV DC REMOTE DEVICE TYPE: NORMAL
OHS CV RV PACING PERCENT: 0 %

## 2024-06-10 ENCOUNTER — PATIENT MESSAGE (OUTPATIENT)
Dept: INTERNAL MEDICINE | Facility: CLINIC | Age: 44
End: 2024-06-10
Payer: COMMERCIAL

## 2024-06-27 ENCOUNTER — CLINICAL SUPPORT (OUTPATIENT)
Dept: CARDIOLOGY | Facility: HOSPITAL | Age: 44
End: 2024-06-27
Payer: COMMERCIAL

## 2024-06-27 ENCOUNTER — CLINICAL SUPPORT (OUTPATIENT)
Dept: CARDIOLOGY | Facility: HOSPITAL | Age: 44
End: 2024-06-27
Attending: INTERNAL MEDICINE
Payer: COMMERCIAL

## 2024-06-27 DIAGNOSIS — I45.81 LONG QT SYNDROME: ICD-10-CM

## 2024-06-27 DIAGNOSIS — Z95.810 PRESENCE OF AUTOMATIC (IMPLANTABLE) CARDIAC DEFIBRILLATOR: ICD-10-CM

## 2024-06-27 PROCEDURE — 93296 REM INTERROG EVL PM/IDS: CPT | Performed by: INTERNAL MEDICINE

## 2024-06-27 PROCEDURE — 93295 DEV INTERROG REMOTE 1/2/MLT: CPT | Mod: ,,, | Performed by: INTERNAL MEDICINE

## 2024-07-06 LAB
OHS CV AF BURDEN PERCENT: < 1
OHS CV DC REMOTE DEVICE TYPE: NORMAL
OHS CV RV PACING PERCENT: 0.02 %

## 2024-08-14 RX ORDER — METOPROLOL SUCCINATE 25 MG/1
25 TABLET, EXTENDED RELEASE ORAL
Qty: 90 TABLET | Refills: 2 | Status: SHIPPED | OUTPATIENT
Start: 2024-08-14

## 2024-08-19 ENCOUNTER — HOSPITAL ENCOUNTER (OUTPATIENT)
Dept: RADIOLOGY | Facility: OTHER | Age: 44
Discharge: HOME | End: 2024-08-19
Attending: INTERNAL MEDICINE
Payer: COMMERCIAL

## 2024-08-19 ENCOUNTER — OFFICE VISIT (OUTPATIENT)
Dept: OBSTETRICS AND GYNECOLOGY | Facility: CLINIC | Age: 44
End: 2024-08-19
Payer: COMMERCIAL

## 2024-08-19 VITALS
WEIGHT: 114.19 LBS | BODY MASS INDEX: 21.02 KG/M2 | HEIGHT: 62 IN | DIASTOLIC BLOOD PRESSURE: 86 MMHG | SYSTOLIC BLOOD PRESSURE: 118 MMHG

## 2024-08-19 DIAGNOSIS — Z01.419 WELL WOMAN EXAM WITH ROUTINE GYNECOLOGICAL EXAM: Primary | ICD-10-CM

## 2024-08-19 DIAGNOSIS — N93.9 ABNORMAL UTERINE BLEEDING (AUB): ICD-10-CM

## 2024-08-19 DIAGNOSIS — Z12.31 ENCOUNTER FOR SCREENING MAMMOGRAM FOR BREAST CANCER: ICD-10-CM

## 2024-08-19 PROCEDURE — 1160F RVW MEDS BY RX/DR IN RCRD: CPT | Mod: CPTII,S$GLB,, | Performed by: OBSTETRICS & GYNECOLOGY

## 2024-08-19 PROCEDURE — 3074F SYST BP LT 130 MM HG: CPT | Mod: CPTII,S$GLB,, | Performed by: OBSTETRICS & GYNECOLOGY

## 2024-08-19 PROCEDURE — 99999 PR PBB SHADOW E&M-EST. PATIENT-LVL III: CPT | Mod: PBBFAC,,, | Performed by: OBSTETRICS & GYNECOLOGY

## 2024-08-19 PROCEDURE — 77067 SCR MAMMO BI INCL CAD: CPT | Mod: TC

## 2024-08-19 PROCEDURE — 3079F DIAST BP 80-89 MM HG: CPT | Mod: CPTII,S$GLB,, | Performed by: OBSTETRICS & GYNECOLOGY

## 2024-08-19 PROCEDURE — 1159F MED LIST DOCD IN RCRD: CPT | Mod: CPTII,S$GLB,, | Performed by: OBSTETRICS & GYNECOLOGY

## 2024-08-19 PROCEDURE — 3008F BODY MASS INDEX DOCD: CPT | Mod: CPTII,S$GLB,, | Performed by: OBSTETRICS & GYNECOLOGY

## 2024-08-19 PROCEDURE — 99396 PREV VISIT EST AGE 40-64: CPT | Mod: S$GLB,,, | Performed by: OBSTETRICS & GYNECOLOGY

## 2024-08-19 PROCEDURE — 3044F HG A1C LEVEL LT 7.0%: CPT | Mod: CPTII,S$GLB,, | Performed by: OBSTETRICS & GYNECOLOGY

## 2024-08-19 RX ORDER — TRANEXAMIC ACID 650 MG/1
1300 TABLET ORAL 3 TIMES DAILY
Qty: 30 TABLET | Refills: 11 | Status: SHIPPED | OUTPATIENT
Start: 2024-08-19 | End: 2024-08-24

## 2024-08-19 NOTE — PROGRESS NOTES
Subjective:       Patient ID: Arianna Paredes is a 43 y.o. female.    Chief Complaint:  Annual Exam (Heavy periods)    History of Present Illness:  HPI  43 y.o. female  here for annual.     She describes her periods as regular, lasting 4-5 days, heavy flow with passage of clots. Cramping with menses. Mother had an endometrial ablation and she has questions about the procedure.  denies vaginal itching or irritation.  denies vaginal discharge.    She is sexually active with 1 partner.  She uses partner vasectomy for contraception.    History of abnormal pap: Yes - - LSIL  Last Pap:  - NILM/HPV neg  Last MM2023 - Birads 1  Last Colonoscopy:  benign    Family History   Problem Relation Name Age of Onset    Breast cancer Maternal Grandmother      Breast cancer Maternal Aunt          great great aunt    Long QT syndrome Mother      Long QT syndrome Brother      Psoriasis Neg Hx      Lupus Neg Hx      Eczema Neg Hx      Acne Neg Hx      Melanoma Neg Hx      Ovarian cancer Neg Hx      Colon cancer Neg Hx           GYN & OB History  Patient's last menstrual period was 2024 (approximate).   Date of Last Pap: 2022    OB History    Para Term  AB Living   2 2 2 0 0 2   SAB IAB Ectopic Multiple Live Births   0 0 0   2      # Outcome Date GA Lbr Fransico/2nd Weight Sex Type Anes PTL Lv   2 Term 30 38w5d 08:46 / 00:13 3.375 kg (7 lb 7.1 oz) M Vag-Spont EPI N ENA   1 Term 11 40w0d  3.515 kg (7 lb 12 oz) M Vag-Spont EPI  ENA       Review of Systems  Review of Systems   Constitutional:  Negative for chills, diaphoresis, fatigue and fever.   Respiratory:  Negative for cough and shortness of breath.    Cardiovascular:  Negative for chest pain and palpitations.   Gastrointestinal:  Negative for abdominal pain, constipation, diarrhea, nausea and vomiting.   Genitourinary:  Negative for dyspareunia, pelvic pain, vaginal bleeding, vaginal discharge and vaginal pain.    Integumentary:  Negative for breast mass, nipple discharge and breast skin changes.   Neurological:  Negative for headaches.   Psychiatric/Behavioral:  Negative for depression. The patient is not nervous/anxious.    Breast: Negative for mass, mastodynia, nipple discharge and skin changes          Objective:    Physical Exam:   Constitutional: She is oriented to person, place, and time. She appears well-developed and well-nourished. No distress.    HENT:   Head: Normocephalic and atraumatic.       Pulmonary/Chest: Effort normal. She exhibits no mass and no tenderness. Right breast exhibits no inverted nipple, no mass, no nipple discharge, no skin change, no tenderness and no swelling. Left breast exhibits no inverted nipple, no mass, no nipple discharge, no skin change, no tenderness and no swelling. Breasts are symmetrical.            Abdominal: Soft. She exhibits no distension. There is no abdominal tenderness.     Genitourinary:    Genitourinary Comments: Normal external female genitalia; vagina rugated, normal, urethra palpated; cervix normal, no masses; uterus small mobile nontender; no adnexal masses palpated.             Musculoskeletal: Normal range of motion and moves all extremeties.       Neurological: She is alert and oriented to person, place, and time.    Skin: Skin is warm.    Psychiatric: She has a normal mood and affect. Her behavior is normal. Judgment and thought content normal.          Assessment:        1. Well woman exam with routine gynecological exam    2. Abnormal uterine bleeding (AUB)                   Plan:      Arianna was seen today for annual exam.    Diagnoses and all orders for this visit:    Well woman exam with routine gynecological exam  - Pap guidelines discussed. Pap up to date.   - Breast cancer screening - MMG scheduled.  - Colon cancer screening - repeat age 46.  - Contraception - partner vasectomy.   - AUB - discussed Mirena IUD, Lysteda, endometrial ablation procedure.   She  will try Lysteda and consider other options.     No orders of the defined types were placed in this encounter.      Follow up in about 1 year (around 8/19/2025) for annual.

## 2024-08-22 ENCOUNTER — TELEPHONE (OUTPATIENT)
Dept: DERMATOLOGY | Facility: CLINIC | Age: 44
End: 2024-08-22
Payer: COMMERCIAL

## 2024-08-22 NOTE — TELEPHONE ENCOUNTER
"----- Message from Elizabeth Toussaint sent at 8/22/2024 12:37 PM CDT -----  Regarding: Appointment  "Type:  Patient Call Back    Who Called:PT    What is the reqeust in detail:Pt requesting call back to schedule annual checkup for skin check. Please advise    Can the clinic reply by [facility]?no     Best Call Back Number:[phone]      Additional Information:  "

## 2024-09-26 ENCOUNTER — CLINICAL SUPPORT (OUTPATIENT)
Dept: CARDIOLOGY | Facility: HOSPITAL | Age: 44
End: 2024-09-26
Attending: INTERNAL MEDICINE
Payer: COMMERCIAL

## 2024-09-26 ENCOUNTER — CLINICAL SUPPORT (OUTPATIENT)
Dept: CARDIOLOGY | Facility: HOSPITAL | Age: 44
End: 2024-09-26
Payer: COMMERCIAL

## 2024-09-26 DIAGNOSIS — Z95.810 PRESENCE OF AUTOMATIC (IMPLANTABLE) CARDIAC DEFIBRILLATOR: ICD-10-CM

## 2024-09-26 DIAGNOSIS — I45.81 LONG QT SYNDROME: ICD-10-CM

## 2024-09-26 PROCEDURE — 93296 REM INTERROG EVL PM/IDS: CPT | Performed by: INTERNAL MEDICINE

## 2024-09-26 PROCEDURE — 93295 DEV INTERROG REMOTE 1/2/MLT: CPT | Mod: ,,, | Performed by: INTERNAL MEDICINE

## 2024-11-01 DIAGNOSIS — I45.81 LONG Q-T SYNDROME: ICD-10-CM

## 2024-11-01 RX ORDER — POTASSIUM CHLORIDE 20 MEQ/1
TABLET, EXTENDED RELEASE ORAL
Qty: 90 TABLET | Refills: 1 | Status: SHIPPED | OUTPATIENT
Start: 2024-11-01

## 2024-11-07 ENCOUNTER — OFFICE VISIT (OUTPATIENT)
Dept: DERMATOLOGY | Facility: CLINIC | Age: 44
End: 2024-11-07
Payer: COMMERCIAL

## 2024-11-07 DIAGNOSIS — D22.9 MULTIPLE BENIGN NEVI: Primary | ICD-10-CM

## 2024-11-07 DIAGNOSIS — L82.1 SK (SEBORRHEIC KERATOSIS): ICD-10-CM

## 2024-11-07 DIAGNOSIS — Z87.898 HISTORY OF ATYPICAL NEVUS: ICD-10-CM

## 2024-11-07 DIAGNOSIS — Z12.83 SCREENING EXAM FOR SKIN CANCER: ICD-10-CM

## 2024-11-07 PROCEDURE — 99999 PR PBB SHADOW E&M-EST. PATIENT-LVL III: CPT | Mod: PBBFAC,,, | Performed by: DERMATOLOGY

## 2024-11-07 NOTE — PROGRESS NOTES
Subjective:      Patient ID:  Arianna Paredes is a 44 y.o. female who presents for   Chief Complaint   Patient presents with    Skin Check     TBSE     History of Present Illness: The patient presents for follow up of skin check.    The patient was last seen on: 2/15/2022 for skin check.  No h/o nmsc or mm.     Other skin complaints: None    No - family history of MM  Yes - childhood blistering sunburns  Distant - tanning bed use           Review of Systems   Skin:  Positive for daily sunscreen use and activity-related sunscreen use. Negative for recent sunburn.   Hematologic/Lymphatic: Does not bruise/bleed easily (has pacemaker and defib. ).       Objective:   Physical Exam   Constitutional: She appears well-developed and well-nourished. No distress.   Neurological: She is alert and oriented to person, place, and time. She is not disoriented.   Psychiatric: She has a normal mood and affect.   Skin:   Areas Examined (abnormalities noted in diagram):   Scalp / Hair Palpated and Inspected  Head / Face Inspection Performed  Neck Inspection Performed  Chest / Axilla Inspection Performed  Abdomen Inspection Performed  Genitals / Buttocks / Groin Inspection Performed  Back Inspection Performed  RUE Inspected  LUE Inspection Performed  RLE Inspected  LLE Inspection Performed  Nails and Digits Inspection Performed                     Diagram Legend     Erythematous scaling macule/papule c/w actinic keratosis       Vascular papule c/w angioma      Pigmented verrucoid papule/plaque c/w seborrheic keratosis      Yellow umbilicated papule c/w sebaceous hyperplasia      Irregularly shaped tan macule c/w lentigo     1-2 mm smooth white papules consistent with Milia      Movable subcutaneous cyst with punctum c/w epidermal inclusion cyst      Subcutaneous movable cyst c/w pilar cyst      Firm pink to brown papule c/w dermatofibroma      Pedunculated fleshy papule(s) c/w skin tag(s)      Evenly pigmented macule c/w junctional  nevus     Mildly variegated pigmented, slightly irregular-bordered macule c/w mildly atypical nevus      Flesh colored to evenly pigmented papule c/w intradermal nevus       Pink pearly papule/plaque c/w basal cell carcinoma      Erythematous hyperkeratotic cursted plaque c/w SCC      Surgical scar with no sign of skin cancer recurrence      Open and closed comedones      Inflammatory papules and pustules      Verrucoid papule consistent consistent with wart     Erythematous eczematous patches and plaques     Dystrophic onycholytic nail with subungual debris c/w onychomycosis     Umbilicated papule    Erythematous-base heme-crusted tan verrucoid plaque consistent with inflamed seborrheic keratosis     Erythematous Silvery Scaling Plaque c/w Psoriasis     See annotation      Assessment / Plan:        Multiple benign nevi   - minor problem and chronic.   Reassurance given to patient. No treatment necessary.       SK (seborrheic keratosis)  These are benign inherited growths without a malignant potential. Reassurance given to patient. No treatment is necessary.     Screening exam for skin cancer in pt with h/o atypical nevus  Area of previous dysplastic nevus examined. Site well healed with no signs of recurrence.    Total body skin examination performed today including at least 12 points as noted in physical examination. No lesions suspicious for malignancy noted.    Recommend daily sun protection/avoidance, use of at least SPF 30, broad spectrum sunscreen (OTC drug), skin self examinations, and routine physician surveillance to optimize early detection             Follow up in about 1 year (around 11/7/2025).

## 2024-11-21 ENCOUNTER — PATIENT MESSAGE (OUTPATIENT)
Dept: OPTOMETRY | Facility: CLINIC | Age: 44
End: 2024-11-21
Payer: COMMERCIAL

## 2024-12-18 ENCOUNTER — TELEPHONE (OUTPATIENT)
Dept: OPTOMETRY | Facility: CLINIC | Age: 44
End: 2024-12-18
Payer: COMMERCIAL

## 2024-12-18 NOTE — TELEPHONE ENCOUNTER
----- Message from Sandra Pleitez sent at 12/18/2024  4:06 PM CST -----    ----- Message -----  From: Christy Frias  Sent: 12/18/2024   3:47 PM CST  To: [facility]    Type:  Appointment Request    Name of Caller:DAHIANA HARO [[id number]]    When is the first available appointment?No access    Symptoms:annual eye exam w contact    Would the patient rather a call back or a response via [facility]? Call back     Best Call Back Number: [phone]    Additional Information: patient states she would like to reschedule her annual eye exam. Patient states she needs her contact  renewed as soon as possible. Patient states she would payton to see if possible to be seen for 01/08. Patient states she would like a call back with further assistance in scheduling for a sooner appointment.

## 2024-12-26 ENCOUNTER — CLINICAL SUPPORT (OUTPATIENT)
Dept: CARDIOLOGY | Facility: HOSPITAL | Age: 44
End: 2024-12-26
Payer: COMMERCIAL

## 2024-12-26 ENCOUNTER — CLINICAL SUPPORT (OUTPATIENT)
Dept: CARDIOLOGY | Facility: HOSPITAL | Age: 44
End: 2024-12-26
Attending: INTERNAL MEDICINE
Payer: COMMERCIAL

## 2024-12-26 DIAGNOSIS — I45.81 LONG QT SYNDROME: ICD-10-CM

## 2024-12-26 DIAGNOSIS — Z95.810 PRESENCE OF AUTOMATIC (IMPLANTABLE) CARDIAC DEFIBRILLATOR: ICD-10-CM

## 2024-12-26 PROCEDURE — 93296 REM INTERROG EVL PM/IDS: CPT | Performed by: INTERNAL MEDICINE

## 2024-12-26 PROCEDURE — 93295 DEV INTERROG REMOTE 1/2/MLT: CPT | Mod: ,,, | Performed by: INTERNAL MEDICINE

## 2025-02-11 ENCOUNTER — PATIENT MESSAGE (OUTPATIENT)
Dept: DERMATOLOGY | Facility: CLINIC | Age: 45
End: 2025-02-11
Payer: COMMERCIAL

## 2025-02-25 RX ORDER — METOPROLOL SUCCINATE 25 MG/1
25 TABLET, EXTENDED RELEASE ORAL
Qty: 90 TABLET | Refills: 2 | Status: SHIPPED | OUTPATIENT
Start: 2025-02-25

## 2025-03-27 ENCOUNTER — CLINICAL SUPPORT (OUTPATIENT)
Dept: CARDIOLOGY | Facility: HOSPITAL | Age: 45
End: 2025-03-27
Payer: COMMERCIAL

## 2025-03-27 ENCOUNTER — CLINICAL SUPPORT (OUTPATIENT)
Dept: CARDIOLOGY | Facility: HOSPITAL | Age: 45
End: 2025-03-27
Attending: INTERNAL MEDICINE
Payer: COMMERCIAL

## 2025-03-27 DIAGNOSIS — Z95.810 PRESENCE OF AUTOMATIC (IMPLANTABLE) CARDIAC DEFIBRILLATOR: ICD-10-CM

## 2025-03-27 DIAGNOSIS — I45.81 LONG QT SYNDROME: ICD-10-CM

## 2025-03-27 PROCEDURE — 93295 DEV INTERROG REMOTE 1/2/MLT: CPT | Mod: ,,, | Performed by: INTERNAL MEDICINE

## 2025-03-27 PROCEDURE — 93296 REM INTERROG EVL PM/IDS: CPT | Performed by: INTERNAL MEDICINE

## 2025-04-23 ENCOUNTER — PATIENT MESSAGE (OUTPATIENT)
Dept: DERMATOLOGY | Facility: CLINIC | Age: 45
End: 2025-04-23
Payer: COMMERCIAL

## 2025-05-05 ENCOUNTER — OFFICE VISIT (OUTPATIENT)
Dept: INTERNAL MEDICINE | Facility: CLINIC | Age: 45
End: 2025-05-05
Payer: COMMERCIAL

## 2025-05-05 VITALS
WEIGHT: 114.06 LBS | DIASTOLIC BLOOD PRESSURE: 70 MMHG | BODY MASS INDEX: 20.99 KG/M2 | SYSTOLIC BLOOD PRESSURE: 110 MMHG | HEIGHT: 62 IN | HEART RATE: 58 BPM | OXYGEN SATURATION: 98 %

## 2025-05-05 DIAGNOSIS — I45.81 LONG Q-T SYNDROME: ICD-10-CM

## 2025-05-05 DIAGNOSIS — Z00.00 ENCOUNTER FOR PREVENTIVE HEALTH EXAMINATION: Primary | ICD-10-CM

## 2025-05-05 DIAGNOSIS — Z95.810 AUTOMATIC IMPLANTABLE CARDIOVERTER-DEFIBRILLATOR IN SITU: ICD-10-CM

## 2025-05-05 PROCEDURE — 99999 PR PBB SHADOW E&M-EST. PATIENT-LVL IV: CPT | Mod: PBBFAC,,, | Performed by: PHYSICIAN ASSISTANT

## 2025-05-05 PROCEDURE — 3008F BODY MASS INDEX DOCD: CPT | Mod: CPTII,S$GLB,, | Performed by: PHYSICIAN ASSISTANT

## 2025-05-05 PROCEDURE — 3078F DIAST BP <80 MM HG: CPT | Mod: CPTII,S$GLB,, | Performed by: PHYSICIAN ASSISTANT

## 2025-05-05 PROCEDURE — 1160F RVW MEDS BY RX/DR IN RCRD: CPT | Mod: CPTII,S$GLB,, | Performed by: PHYSICIAN ASSISTANT

## 2025-05-05 PROCEDURE — 1159F MED LIST DOCD IN RCRD: CPT | Mod: CPTII,S$GLB,, | Performed by: PHYSICIAN ASSISTANT

## 2025-05-05 PROCEDURE — 3074F SYST BP LT 130 MM HG: CPT | Mod: CPTII,S$GLB,, | Performed by: PHYSICIAN ASSISTANT

## 2025-05-05 PROCEDURE — 99396 PREV VISIT EST AGE 40-64: CPT | Mod: S$GLB,,, | Performed by: PHYSICIAN ASSISTANT

## 2025-05-05 RX ORDER — POTASSIUM CHLORIDE 20 MEQ/1
TABLET, EXTENDED RELEASE ORAL
Qty: 90 TABLET | Refills: 3 | Status: SHIPPED | OUTPATIENT
Start: 2025-05-05

## 2025-05-05 NOTE — PROGRESS NOTES
Subjective:       Patient ID: Arianna Paredes is a 44 y.o. female.        Chief Complaint: Annual Exam    Arianna Paredes is an established patient of Zunilda Beckett MD here today for annual exam.    , 2 children.     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 Braydon BARR      Heavy menstrual cycles, for the past year, passes large clots of blood, cycles last 7 days, will have to wear a super tampon and 2 pads due to heavy bleeding, fatigued during menstrual cycles and hope, all of which is new in the past year, saw GYN, discussed possible ablation but decided against it    Will be seeing Dr. Baumann and GYN upcoming                   Review of Systems   Constitutional:  Negative for chills, diaphoresis, fatigue and fever.   HENT:  Negative for congestion and sore throat.    Eyes:  Negative for visual disturbance.   Respiratory:  Negative for cough, chest tightness and shortness of breath.    Cardiovascular:  Negative for chest pain, palpitations and leg swelling.   Gastrointestinal:  Negative for abdominal pain, blood in stool, constipation, diarrhea, nausea and vomiting.   Genitourinary:  Negative for dysuria, frequency, hematuria and urgency.   Musculoskeletal:  Negative for arthralgias and back pain.   Skin:  Negative for rash.   Neurological:  Negative for dizziness, syncope, weakness and headaches.   Psychiatric/Behavioral:  Negative for dysphoric mood and sleep disturbance. The patient is not nervous/anxious.        Objective:      Physical Exam  Vitals and nursing note reviewed.   Constitutional:       Appearance: Normal appearance. She is well-developed.   HENT:      Head: Normocephalic.      Right Ear: Tympanic membrane and external ear normal.      Left Ear: Tympanic membrane and external  "ear normal.      Nose: No mucosal edema or rhinorrhea.      Mouth/Throat:      Pharynx: Oropharynx is clear.   Eyes:      Pupils: Pupils are equal, round, and reactive to light.   Cardiovascular:      Rate and Rhythm: Normal rate and regular rhythm.      Heart sounds: Normal heart sounds. No murmur heard.     No friction rub. No gallop.   Pulmonary:      Effort: Pulmonary effort is normal. No respiratory distress.      Breath sounds: Normal breath sounds.   Abdominal:      Palpations: Abdomen is soft.      Tenderness: There is no abdominal tenderness.   Skin:     General: Skin is warm and dry.   Neurological:      Mental Status: She is alert.         Assessment:       1. Encounter for preventive health examination    2. Long Q-T syndrome    3. Automatic implantable cardioverter-defibrillator in situ        Plan:       Arianna was seen today for annual exam.    Diagnoses and all orders for this visit:    Encounter for preventive health examination  -     CBC Auto Differential; Future  -     Comprehensive Metabolic Panel; Future  -     Hemoglobin A1C; Future  -     Lipid Panel; Future  -     TSH; Future  -     Vitamin B12; Future  -     Iron and TIBC; Future  -     Ferritin; Future    Long Q-T syndrome  -     REFILL: potassium chloride SA (K-DUR,KLOR-CON) 20 MEQ tablet; TAKE 1 TABLET BY MOUTH EVERY DAY    Automatic implantable cardioverter-defibrillator in situ    Health and safety issues reviewed  Schedule fasting lab work  To see Dr. Beckett next year for annual    Pt has been given instructions populated from patient instructions database and has verbalized understanding of the after visit summary and information contained wherein.    Follow up with a primary care provider. May go to ER for acute shortness of breath, lightheadedness, fever, or any other emergent complaints or changes in condition.    "This note will be shared with the patient"    Future Appointments   Date Time Provider Department Center   5/27/2025 "  2:20 PM Richelle Baumann MD [facility] DERM Stephen Mendez

## 2025-05-13 ENCOUNTER — PATIENT MESSAGE (OUTPATIENT)
Dept: ELECTROPHYSIOLOGY | Facility: CLINIC | Age: 45
End: 2025-05-13
Payer: COMMERCIAL

## 2025-05-13 ENCOUNTER — DOCUMENTATION ONLY (OUTPATIENT)
Dept: CARDIOLOGY | Facility: HOSPITAL | Age: 45
End: 2025-05-13
Payer: COMMERCIAL

## 2025-05-27 ENCOUNTER — OFFICE VISIT (OUTPATIENT)
Dept: DERMATOLOGY | Facility: CLINIC | Age: 45
End: 2025-05-27
Payer: COMMERCIAL

## 2025-05-27 DIAGNOSIS — D48.5 NEOPLASM OF UNCERTAIN BEHAVIOR OF SKIN: Primary | ICD-10-CM

## 2025-05-27 DIAGNOSIS — D22.9 MULTIPLE BENIGN NEVI: ICD-10-CM

## 2025-05-27 DIAGNOSIS — L82.1 SK (SEBORRHEIC KERATOSIS): ICD-10-CM

## 2025-05-27 DIAGNOSIS — Z12.83 SCREENING EXAM FOR SKIN CANCER: ICD-10-CM

## 2025-05-27 DIAGNOSIS — Z87.898 HISTORY OF ATYPICAL NEVUS: ICD-10-CM

## 2025-05-27 PROCEDURE — 99213 OFFICE O/P EST LOW 20 MIN: CPT | Mod: 25,S$GLB,, | Performed by: DERMATOLOGY

## 2025-05-27 PROCEDURE — 11102 TANGNTL BX SKIN SINGLE LES: CPT | Mod: S$GLB,,, | Performed by: DERMATOLOGY

## 2025-05-27 PROCEDURE — 99999 PR PBB SHADOW E&M-EST. PATIENT-LVL III: CPT | Mod: PBBFAC,,, | Performed by: DERMATOLOGY

## 2025-05-27 PROCEDURE — 88342 IMHCHEM/IMCYTCHM 1ST ANTB: CPT | Mod: TC,59 | Performed by: DERMATOLOGY

## 2025-05-27 PROCEDURE — 1160F RVW MEDS BY RX/DR IN RCRD: CPT | Mod: CPTII,S$GLB,, | Performed by: DERMATOLOGY

## 2025-05-27 PROCEDURE — 1159F MED LIST DOCD IN RCRD: CPT | Mod: CPTII,S$GLB,, | Performed by: DERMATOLOGY

## 2025-05-27 NOTE — PATIENT INSTRUCTIONS
Shave Biopsy Wound Care    Your doctor has performed a shave biopsy today.  A band aid and vaseline ointment has been placed over the site.  This should remain in place for NO LONGER THAN 48 hours.  It is fine to remove the bandaid after 24 hours, if the area is no longer bleeding. It is recommended that you keep the area dry (do not wet)) for the first 24 hours.  After 24 hours, wash the area with warm soap and water and apply Vaseline jelly.  Many patients prefer to use Neosporin or Bacitracin ointment.  This is acceptable; however, know that you can develop an allergy to this medication even if you have used it safely for years.  It is important to keep the area moist.  Letting it dry out and get air slows healing time, and will worsen the scar.        If you notice increasing redness, tenderness, pain, or yellow drainage at the biopsy site, please notify your doctor.  These are signs of an infection.    If your biopsy site is bleeding, apply firm pressure for 15 minutes straight.  Repeat for another 15 minutes, if it is still bleeding.   If the surgical site continues to bleed, then please contact your doctor.      For [facility] users:   You will receive your biopsy results in [facility] as soon as they are available. Please be assured that your physician/provider will review your results and will then determine what further treatment, evaluation, or planning is required. You should be contacted by your physician's/provider's office within 5 business days of receiving your results; If not, please reach out to directly. This is one more way [employer] is putting you first.     [facility] [place] [address]/ [phone] FAX/ [email or web address]

## 2025-05-27 NOTE — PROGRESS NOTES
Subjective:      Patient ID:  Arianna Paredes is a 44 y.o. female who presents for   Chief Complaint   Patient presents with    Skin Check     TBSE      History of Present Illness: The patient presents for follow up of skin check.    The patient was last seen on: 11/7/24 for TBSE.   No h/o nmsc or mm.     Other skin complaints:   Patient with new area of concern:   Location: left ear +raised (months)   Previous treatments: none       No - family history of MM  Yes - childhood blistering sunburns  Distant - tanning bed use         Review of Systems   Skin:  Positive for daily sunscreen use, activity-related sunscreen use and wears hat (sometimes). Negative for recent sunburn.   Hematologic/Lymphatic: Does not bruise/bleed easily (has pacemaker and defib. ).       Objective:   Physical Exam   Constitutional: She appears well-developed and well-nourished. No distress.   Neurological: She is alert and oriented to person, place, and time. She is not disoriented.   Psychiatric: She has a normal mood and affect.   Skin:   Areas Examined (abnormalities noted in diagram):   Scalp / Hair Palpated and Inspected  Head / Face Inspection Performed  Neck Inspection Performed  Chest / Axilla Inspection Performed  Abdomen Inspection Performed  Genitals / Buttocks / Groin Inspection Performed  Back Inspection Performed  RUE Inspected  LUE Inspection Performed  RLE Inspected  LLE Inspection Performed  Nails and Digits Inspection Performed                     Diagram Legend     Erythematous scaling macule/papule c/w actinic keratosis       Vascular papule c/w angioma      Pigmented verrucoid papule/plaque c/w seborrheic keratosis      Yellow umbilicated papule c/w sebaceous hyperplasia      Irregularly shaped tan macule c/w lentigo     1-2 mm smooth white papules consistent with Milia      Movable subcutaneous cyst with punctum c/w epidermal inclusion cyst      Subcutaneous movable cyst c/w pilar cyst      Firm pink to brown  papule c/w dermatofibroma      Pedunculated fleshy papule(s) c/w skin tag(s)      Evenly pigmented macule c/w junctional nevus     Mildly variegated pigmented, slightly irregular-bordered macule c/w mildly atypical nevus      Flesh colored to evenly pigmented papule c/w intradermal nevus       Pink pearly papule/plaque c/w basal cell carcinoma      Erythematous hyperkeratotic cursted plaque c/w SCC      Surgical scar with no sign of skin cancer recurrence      Open and closed comedones      Inflammatory papules and pustules      Verrucoid papule consistent consistent with wart     Erythematous eczematous patches and plaques     Dystrophic onycholytic nail with subungual debris c/w onychomycosis     Umbilicated papule    Erythematous-base heme-crusted tan verrucoid plaque consistent with inflamed seborrheic keratosis     Erythematous Silvery Scaling Plaque c/w Psoriasis     See annotation            Assessment / Plan:      Pathology Orders:       Normal Orders This Visit    Specimen to Pathology, Dermatology     Questions:    Procedure Type: Dermatology and skin neoplasms    Number of Specimens: 1    ------------------------: -------------------------    Spec 1 Procedure: Shave Biopsy    Spec 1 Clinical Impression: r/o atypical nevus    Spec 1 Source: left abdomen    Clinical Information: see EPIC    Clinical History: see EPIC    Specimen Source: Skin    Release to patient: Immediate    Send normal result to authorizing provider's In Basket if patient is active on MyChart: Yes          Neoplasm of uncertain behavior of skin  -     Specimen to Pathology, Dermatology  Shave biopsy procedure note:    Shave biopsy performed after verbal consent including risk of infection, scar, recurrence, need for additional treatment of site. Area prepped with alcohol, anesthetized with approximately 1.0cc of 1% lidocaine with epinephrine. Lesional tissue shaved with razor blade. Hemostasis achieved with application of aluminum  chloride followed by hyfrecation. No complications. Dressing applied. Wound care explained.    Multiple benign nevi   - minor problem and chronic.   Reassurance given to patient. No treatment necessary.     SK (seborrheic keratosis)  These are benign inherited growths without a malignant potential. Reassurance given to patient. No treatment is necessary.       Screening exam for skin cancer in pt with History of atypical nevus  Area of previous dysplastic examined. Site well healed with no signs of recurrence.    Total body skin examination performed today including at least 12 points as noted in physical examination. No lesions suspicious for malignancy noted.    Recommend daily sun protection/avoidance, use of at least SPF 30, broad spectrum sunscreen (OTC drug), skin self examinations, and routine physician surveillance to optimize early detection             No follow-ups on file.

## 2025-05-30 ENCOUNTER — PATIENT MESSAGE (OUTPATIENT)
Dept: OBSTETRICS AND GYNECOLOGY | Facility: CLINIC | Age: 45
End: 2025-05-30
Payer: COMMERCIAL

## 2025-05-30 DIAGNOSIS — Z12.31 ENCOUNTER FOR SCREENING MAMMOGRAM FOR MALIGNANT NEOPLASM OF BREAST: Primary | ICD-10-CM

## 2025-05-30 LAB
ESTROGEN SERPL-MCNC: NORMAL PG/ML
INSULIN SERPL-ACNC: NORMAL U[IU]/ML
LAB AP CLINICAL INFORMATION: NORMAL
LAB AP GROSS DESCRIPTION: NORMAL
LAB AP REPORT FOOTNOTES: NORMAL
T3RU NFR SERPL: NORMAL %

## 2025-06-02 ENCOUNTER — RESULTS FOLLOW-UP (OUTPATIENT)
Dept: DERMATOLOGY | Facility: CLINIC | Age: 45
End: 2025-06-02

## 2025-06-25 ENCOUNTER — PATIENT MESSAGE (OUTPATIENT)
Dept: OBSTETRICS AND GYNECOLOGY | Facility: CLINIC | Age: 45
End: 2025-06-25
Payer: COMMERCIAL

## 2025-06-25 ENCOUNTER — PATIENT MESSAGE (OUTPATIENT)
Dept: INTERNAL MEDICINE | Facility: CLINIC | Age: 45
End: 2025-06-25
Payer: COMMERCIAL

## 2025-06-26 ENCOUNTER — CLINICAL SUPPORT (OUTPATIENT)
Dept: CARDIOLOGY | Facility: HOSPITAL | Age: 45
End: 2025-06-26
Attending: INTERNAL MEDICINE
Payer: COMMERCIAL

## 2025-06-26 ENCOUNTER — PATIENT MESSAGE (OUTPATIENT)
Dept: INTERNAL MEDICINE | Facility: CLINIC | Age: 45
End: 2025-06-26
Payer: COMMERCIAL

## 2025-06-26 ENCOUNTER — CLINICAL SUPPORT (OUTPATIENT)
Dept: CARDIOLOGY | Facility: HOSPITAL | Age: 45
End: 2025-06-26
Payer: COMMERCIAL

## 2025-06-26 ENCOUNTER — LAB VISIT (OUTPATIENT)
Dept: LAB | Facility: HOSPITAL | Age: 45
End: 2025-06-26
Payer: COMMERCIAL

## 2025-06-26 DIAGNOSIS — Z00.00 ENCOUNTER FOR PREVENTIVE HEALTH EXAMINATION: ICD-10-CM

## 2025-06-26 DIAGNOSIS — Z95.810 PRESENCE OF AUTOMATIC (IMPLANTABLE) CARDIAC DEFIBRILLATOR: ICD-10-CM

## 2025-06-26 DIAGNOSIS — I45.81 LONG QT SYNDROME: ICD-10-CM

## 2025-06-26 LAB
ABSOLUTE EOSINOPHIL (OHS): 0.32 K/UL
ABSOLUTE MONOCYTE (OHS): 0.46 K/UL (ref 0.3–1)
ABSOLUTE NEUTROPHIL COUNT (OHS): 3.36 K/UL (ref 1.8–7.7)
ALBUMIN SERPL BCP-MCNC: 4.6 G/DL (ref 3.5–5.2)
ALP SERPL-CCNC: 54 UNIT/L (ref 40–150)
ALT SERPL W/O P-5'-P-CCNC: 21 UNIT/L (ref 10–44)
ANION GAP (OHS): 9 MMOL/L (ref 8–16)
AST SERPL-CCNC: 26 UNIT/L (ref 11–45)
BASOPHILS # BLD AUTO: 0.07 K/UL
BASOPHILS NFR BLD AUTO: 1.3 %
BILIRUB SERPL-MCNC: 0.7 MG/DL (ref 0.1–1)
BUN SERPL-MCNC: 11 MG/DL (ref 6–20)
CALCIUM SERPL-MCNC: 9.4 MG/DL (ref 8.7–10.5)
CHLORIDE SERPL-SCNC: 103 MMOL/L (ref 95–110)
CHOLEST SERPL-MCNC: 243 MG/DL (ref 120–199)
CHOLEST/HDLC SERPL: 3.1 {RATIO} (ref 2–5)
CO2 SERPL-SCNC: 27 MMOL/L (ref 23–29)
CREAT SERPL-MCNC: 0.7 MG/DL (ref 0.5–1.4)
EAG (OHS): 88 MG/DL (ref 68–131)
ERYTHROCYTE [DISTWIDTH] IN BLOOD BY AUTOMATED COUNT: 11.4 % (ref 11.5–14.5)
FERRITIN SERPL-MCNC: 257 NG/ML (ref 20–300)
GFR SERPLBLD CREATININE-BSD FMLA CKD-EPI: >60 ML/MIN/1.73/M2
GLUCOSE SERPL-MCNC: 84 MG/DL (ref 70–110)
HBA1C MFR BLD: 4.7 % (ref 4–5.6)
HCT VFR BLD AUTO: 38.6 % (ref 37–48.5)
HDLC SERPL-MCNC: 79 MG/DL (ref 40–75)
HDLC SERPL: 32.5 % (ref 20–50)
HGB BLD-MCNC: 13.1 GM/DL (ref 12–16)
IMM GRANULOCYTES # BLD AUTO: 0.01 K/UL (ref 0–0.04)
IMM GRANULOCYTES NFR BLD AUTO: 0.2 % (ref 0–0.5)
IRON SATN MFR SERPL: 39 % (ref 20–50)
IRON SERPL-MCNC: 126 UG/DL (ref 30–160)
LDLC SERPL CALC-MCNC: 148.4 MG/DL (ref 63–159)
LYMPHOCYTES # BLD AUTO: 1.15 K/UL (ref 1–4.8)
MCH RBC QN AUTO: 34.6 PG (ref 27–31)
MCHC RBC AUTO-ENTMCNC: 33.9 G/DL (ref 32–36)
MCV RBC AUTO: 102 FL (ref 82–98)
NONHDLC SERPL-MCNC: 164 MG/DL
NUCLEATED RBC (/100WBC) (OHS): 0 /100 WBC
PLATELET # BLD AUTO: 142 K/UL (ref 150–450)
PMV BLD AUTO: 11.2 FL (ref 9.2–12.9)
POTASSIUM SERPL-SCNC: 4.8 MMOL/L (ref 3.5–5.1)
PROT SERPL-MCNC: 7.6 GM/DL (ref 6–8.4)
RBC # BLD AUTO: 3.79 M/UL (ref 4–5.4)
RELATIVE EOSINOPHIL (OHS): 6 %
RELATIVE LYMPHOCYTE (OHS): 21.4 % (ref 18–48)
RELATIVE MONOCYTE (OHS): 8.6 % (ref 4–15)
RELATIVE NEUTROPHIL (OHS): 62.5 % (ref 38–73)
SODIUM SERPL-SCNC: 139 MMOL/L (ref 136–145)
TIBC SERPL-MCNC: 327 UG/DL (ref 250–450)
TRANSFERRIN SERPL-MCNC: 221 MG/DL (ref 200–375)
TRIGL SERPL-MCNC: 78 MG/DL (ref 30–150)
TSH SERPL-ACNC: 1.36 UIU/ML (ref 0.4–4)
VIT B12 SERPL-MCNC: 467 PG/ML (ref 210–950)
WBC # BLD AUTO: 5.37 K/UL (ref 3.9–12.7)

## 2025-06-26 PROCEDURE — 80061 LIPID PANEL: CPT

## 2025-06-26 PROCEDURE — 83036 HEMOGLOBIN GLYCOSYLATED A1C: CPT

## 2025-06-26 PROCEDURE — 93295 DEV INTERROG REMOTE 1/2/MLT: CPT | Mod: ,,, | Performed by: INTERNAL MEDICINE

## 2025-06-26 PROCEDURE — 80053 COMPREHEN METABOLIC PANEL: CPT

## 2025-06-26 PROCEDURE — 84466 ASSAY OF TRANSFERRIN: CPT

## 2025-06-26 PROCEDURE — 36415 COLL VENOUS BLD VENIPUNCTURE: CPT

## 2025-06-26 PROCEDURE — 85025 COMPLETE CBC W/AUTO DIFF WBC: CPT

## 2025-06-26 PROCEDURE — 93296 REM INTERROG EVL PM/IDS: CPT | Performed by: INTERNAL MEDICINE

## 2025-06-26 PROCEDURE — 84443 ASSAY THYROID STIM HORMONE: CPT

## 2025-06-26 PROCEDURE — 82607 VITAMIN B-12: CPT

## 2025-06-26 PROCEDURE — 82728 ASSAY OF FERRITIN: CPT

## 2025-06-30 ENCOUNTER — RESULTS FOLLOW-UP (OUTPATIENT)
Dept: INTERNAL MEDICINE | Facility: CLINIC | Age: 45
End: 2025-06-30

## 2025-07-05 ENCOUNTER — PATIENT MESSAGE (OUTPATIENT)
Dept: INTERNAL MEDICINE | Facility: CLINIC | Age: 45
End: 2025-07-05
Payer: COMMERCIAL

## 2025-07-05 DIAGNOSIS — H00.019 HORDEOLUM, UNSPECIFIED HORDEOLUM TYPE, UNSPECIFIED LATERALITY: Primary | ICD-10-CM

## 2025-07-07 ENCOUNTER — TELEPHONE (OUTPATIENT)
Dept: INTERNAL MEDICINE | Facility: CLINIC | Age: 45
End: 2025-07-07
Payer: COMMERCIAL

## 2025-07-07 RX ORDER — ERYTHROMYCIN 5 MG/G
OINTMENT OPHTHALMIC 3 TIMES DAILY
Qty: 3.5 G | Refills: 0 | Status: SHIPPED | OUTPATIENT
Start: 2025-07-07

## 2025-07-07 NOTE — TELEPHONE ENCOUNTER
Please call patient to review message below and let me know what she would like to do    Hi Arianna,     I have been out of the office so I apologize for the delay in results.  Your cholesterol has been elevated for years and I know it runs in your family.  Would you be interested in seeing cardiology to discuss this further?  If so, I can place a referral.  Please let me know.  I think this may be a good idea.  You are incredibly healthy so there is little you can do to improve with lifestyle changes.     The remainder of your lab work all looks fine!     Please let me know if you have any additional questions or concerns.       Adamaris Javier PA-C  [facility]  P: [phone]

## 2025-07-22 ENCOUNTER — PATIENT MESSAGE (OUTPATIENT)
Dept: ELECTROPHYSIOLOGY | Facility: CLINIC | Age: 45
End: 2025-07-22
Payer: COMMERCIAL

## 2025-07-22 DIAGNOSIS — I45.81 LONG Q-T SYNDROME: Primary | ICD-10-CM

## 2025-07-22 RX ORDER — METOPROLOL SUCCINATE 25 MG/1
25 TABLET, EXTENDED RELEASE ORAL DAILY
Qty: 90 TABLET | Refills: 3 | Status: SHIPPED | OUTPATIENT
Start: 2025-07-22

## 2025-08-20 ENCOUNTER — HOSPITAL ENCOUNTER (OUTPATIENT)
Dept: RADIOLOGY | Facility: OTHER | Age: 45
Discharge: HOME | End: 2025-08-20
Attending: OBSTETRICS & GYNECOLOGY
Payer: COMMERCIAL

## 2025-08-20 VITALS — WEIGHT: 114 LBS | BODY MASS INDEX: 20.98 KG/M2 | HEIGHT: 62 IN

## 2025-08-20 DIAGNOSIS — Z12.31 ENCOUNTER FOR SCREENING MAMMOGRAM FOR MALIGNANT NEOPLASM OF BREAST: ICD-10-CM

## 2025-08-20 PROCEDURE — 77067 SCR MAMMO BI INCL CAD: CPT | Mod: TC

## (undated) DEVICE — DRESSING TELFA STRL 4X3 LF

## (undated) DEVICE — NDL INSUF ULTRA VERESS 120MM

## (undated) DEVICE — SOL NS 1000CC

## (undated) DEVICE — TRAY FOLEY 16FR INFECTION CONT

## (undated) DEVICE — TROCAR ENDOPATH XCEL 12MM 10CM

## (undated) DEVICE — SUT 0 VICRYL / UR6 (J603)

## (undated) DEVICE — CART STAPLE FLEX ETX 3.5MM BLU

## (undated) DEVICE — KIT ANTIFOG

## (undated) DEVICE — DRAPE STERI INSTRUMENT 1018

## (undated) DEVICE — WARMER DRAPE STERILE LF

## (undated) DEVICE — STAPLER INT LINEAR ARTC 3.5-45

## (undated) DEVICE — NDL 18GA X1 1/2 REG BEVEL

## (undated) DEVICE — ADHESIVE DERMABOND ADVANCED

## (undated) DEVICE — TRAY MINOR GEN SURG

## (undated) DEVICE — DRESSING TRANS 2X2 TEGADERM

## (undated) DEVICE — SUT MCRYL PLUS 4-0 PS2 27IN

## (undated) DEVICE — DRAPE ABDOMINAL TIBURON 14X11

## (undated) DEVICE — CART STAPLE RELD 45MM WHT

## (undated) DEVICE — ELECTRODE REM PLYHSV RETURN 9

## (undated) DEVICE — SYR 30CC LUER LOCK